# Patient Record
Sex: MALE | Race: ASIAN | Employment: OTHER | URBAN - METROPOLITAN AREA
[De-identification: names, ages, dates, MRNs, and addresses within clinical notes are randomized per-mention and may not be internally consistent; named-entity substitution may affect disease eponyms.]

---

## 2018-02-14 ENCOUNTER — LAB ENCOUNTER (OUTPATIENT)
Dept: LAB | Age: 73
End: 2018-02-14
Attending: FAMILY MEDICINE
Payer: MEDICAID

## 2018-02-14 ENCOUNTER — APPOINTMENT (OUTPATIENT)
Dept: LAB | Age: 73
End: 2018-02-14
Attending: FAMILY MEDICINE
Payer: MEDICAID

## 2018-02-14 DIAGNOSIS — I10 ESSENTIAL HYPERTENSION: ICD-10-CM

## 2018-02-14 DIAGNOSIS — E66.9 OBESITY (BMI 30-39.9): ICD-10-CM

## 2018-02-14 DIAGNOSIS — F03.91 DEMENTIA WITH BEHAVIORAL DISTURBANCE, UNSPECIFIED DEMENTIA TYPE (HCC): ICD-10-CM

## 2018-02-14 DIAGNOSIS — R07.89 CHEST DISCOMFORT: ICD-10-CM

## 2018-02-14 PROBLEM — R41.3 MEMORY LOSS: Status: ACTIVE | Noted: 2018-02-14

## 2018-02-14 PROBLEM — R15.1 FECAL SMEARING: Status: ACTIVE | Noted: 2018-02-14

## 2018-02-14 PROBLEM — F03.918 DEMENTIA WITH BEHAVIORAL DISTURBANCE: Status: ACTIVE | Noted: 2018-02-14

## 2018-02-14 PROBLEM — F03.918 DEMENTIA WITH BEHAVIORAL DISTURBANCE (HCC): Status: ACTIVE | Noted: 2018-02-14

## 2018-02-14 PROBLEM — Z85.810 HISTORY OF TONGUE CANCER: Status: ACTIVE | Noted: 2018-02-14

## 2018-02-14 LAB
ALBUMIN SERPL BCP-MCNC: 3.7 G/DL (ref 3.5–4.8)
ALBUMIN/GLOB SERPL: 0.9 {RATIO} (ref 1–2)
ALP SERPL-CCNC: 58 U/L (ref 32–100)
ALT SERPL-CCNC: 13 U/L (ref 17–63)
ANION GAP SERPL CALC-SCNC: 9 MMOL/L (ref 0–18)
AST SERPL-CCNC: 18 U/L (ref 15–41)
BASOPHILS # BLD: 0.1 K/UL (ref 0–0.2)
BASOPHILS NFR BLD: 1 %
BILIRUB SERPL-MCNC: 0.7 MG/DL (ref 0.3–1.2)
BUN SERPL-MCNC: 14 MG/DL (ref 8–20)
BUN/CREAT SERPL: 14 (ref 10–20)
CALCIUM SERPL-MCNC: 9.5 MG/DL (ref 8.5–10.5)
CHLORIDE SERPL-SCNC: 102 MMOL/L (ref 95–110)
CO2 SERPL-SCNC: 25 MMOL/L (ref 22–32)
CREAT SERPL-MCNC: 1 MG/DL (ref 0.5–1.5)
EOSINOPHIL # BLD: 0.1 K/UL (ref 0–0.7)
EOSINOPHIL NFR BLD: 1 %
ERYTHROCYTE [DISTWIDTH] IN BLOOD BY AUTOMATED COUNT: 14.4 % (ref 11–15)
GLOBULIN PLAS-MCNC: 4.2 G/DL (ref 2.5–3.7)
GLUCOSE SERPL-MCNC: 81 MG/DL (ref 70–99)
HCT VFR BLD AUTO: 42.5 % (ref 41–52)
HGB BLD-MCNC: 14 G/DL (ref 13.5–17.5)
LYMPHOCYTES # BLD: 2.9 K/UL (ref 1–4)
LYMPHOCYTES NFR BLD: 28 %
MCH RBC QN AUTO: 27.5 PG (ref 27–32)
MCHC RBC AUTO-ENTMCNC: 33 G/DL (ref 32–37)
MCV RBC AUTO: 83.5 FL (ref 80–100)
MONOCYTES # BLD: 0.9 K/UL (ref 0–1)
MONOCYTES NFR BLD: 9 %
NEUTROPHILS # BLD AUTO: 6.3 K/UL (ref 1.8–7.7)
NEUTROPHILS NFR BLD: 61 %
OSMOLALITY UR CALC.SUM OF ELEC: 282 MOSM/KG (ref 275–295)
PATIENT FASTING: NO
PLATELET # BLD AUTO: 310 K/UL (ref 140–400)
PMV BLD AUTO: 9.3 FL (ref 7.4–10.3)
POTASSIUM SERPL-SCNC: 4.2 MMOL/L (ref 3.3–5.1)
PROT SERPL-MCNC: 7.9 G/DL (ref 5.9–8.4)
RBC # BLD AUTO: 5.09 M/UL (ref 4.5–5.9)
SODIUM SERPL-SCNC: 136 MMOL/L (ref 136–144)
TSH SERPL-ACNC: 1.2 UIU/ML (ref 0.45–5.33)
VIT B12 SERPL-MCNC: 159 PG/ML (ref 181–914)
WBC # BLD AUTO: 10.3 K/UL (ref 4–11)

## 2018-02-14 PROCEDURE — 80053 COMPREHEN METABOLIC PANEL: CPT

## 2018-02-14 PROCEDURE — 93005 ELECTROCARDIOGRAM TRACING: CPT

## 2018-02-14 PROCEDURE — 82607 VITAMIN B-12: CPT

## 2018-02-14 PROCEDURE — 93010 ELECTROCARDIOGRAM REPORT: CPT | Performed by: FAMILY MEDICINE

## 2018-02-14 PROCEDURE — 84443 ASSAY THYROID STIM HORMONE: CPT

## 2018-02-14 PROCEDURE — 36415 COLL VENOUS BLD VENIPUNCTURE: CPT

## 2018-02-14 PROCEDURE — 85025 COMPLETE CBC W/AUTO DIFF WBC: CPT

## 2018-02-14 PROCEDURE — 83036 HEMOGLOBIN GLYCOSYLATED A1C: CPT

## 2018-02-14 NOTE — PROGRESS NOTES
HPI:    Patient ID: Micheal Hunt is a 67year old male. HPI      Patient is new to me and comes in today with his daughter. Daughter states he has been having memory problems for the last 7 years and has been declining.   She states it has become very d called? 1   SAY: I would like you to repeat this phrase after me: \"No ifs, ands or buts. \"  1   Close Your Eyes 1   HAND the person a pencil and paper. SAY: Write any complete sentence on that piece of paper.   1   Copy This Design 1   SAY: Take this marah behavioral disturbance, unspecified dementia type  (primary encounter diagnosis)  Essential hypertension  History of tongue cancer  Obesity (bmi 30-39. 9)  Chest discomfort  Fecal smearing  Memory loss      1.  Dementia with behavioral disturbance, unspecifi

## 2018-02-15 LAB — HBA1C MFR BLD: 7.1 % (ref 4–6)

## 2018-02-22 PROBLEM — IMO0001 UNCONTROLLED TYPE 2 DIABETES MELLITUS WITHOUT COMPLICATION, WITHOUT LONG-TERM CURRENT USE OF INSULIN: Status: ACTIVE | Noted: 2018-02-22

## 2018-02-22 PROBLEM — E53.8 VITAMIN B12 DEFICIENCY: Status: ACTIVE | Noted: 2018-02-22

## 2018-02-23 ENCOUNTER — TELEPHONE (OUTPATIENT)
Dept: OTHER | Age: 73
End: 2018-02-23

## 2018-02-23 NOTE — TELEPHONE ENCOUNTER
Daughter Gabi Gaviria was inform of Dr. Radha Goldsmith message below. DAughter verbalized understanding and will callus back on Monday to set up a appt.  Thanks

## 2018-02-23 NOTE — TELEPHONE ENCOUNTER
Notes Recorded by Mateo Lockwood MD on 2/22/2018 at 9:05 PM CST  Patient has diabetes.  His vitamin B12 levels also very low.   Recommend him getting B12 monthly injections and to repeat his level in 3 months.  Also given his diabetes would recommend startin

## 2018-03-10 RX ORDER — LISINOPRIL 10 MG/1
TABLET ORAL
Qty: 30 TABLET | Refills: 0 | Status: SHIPPED | OUTPATIENT
Start: 2018-03-10 | End: 2018-04-10

## 2018-03-10 NOTE — TELEPHONE ENCOUNTER
Hypertensive Medications  Protocol Criteria:  · Appointment scheduled in the past 6 months or in the next 3 months  · BMP or CMP in the past 12 months  · Creatinine result < 2  Recent Outpatient Visits            3 weeks ago Dementia with behavioral distur

## 2018-04-12 RX ORDER — LISINOPRIL 10 MG/1
TABLET ORAL
Qty: 30 TABLET | Refills: 2 | Status: SHIPPED | OUTPATIENT
Start: 2018-04-12 | End: 2018-05-01

## 2018-04-12 NOTE — TELEPHONE ENCOUNTER
Refilled per protocol.   Hypertensive Medications  Protocol Criteria:  · Appointment scheduled in the past 6 months or in the next 3 months  · BMP or CMP in the past 12 months  · Creatinine result < 2  Recent Outpatient Visits            1 month ago Deanne Landry

## 2018-05-01 ENCOUNTER — OFFICE VISIT (OUTPATIENT)
Dept: FAMILY MEDICINE CLINIC | Facility: CLINIC | Age: 73
End: 2018-05-01

## 2018-05-01 VITALS
DIASTOLIC BLOOD PRESSURE: 84 MMHG | WEIGHT: 200 LBS | HEART RATE: 72 BPM | SYSTOLIC BLOOD PRESSURE: 128 MMHG | HEIGHT: 62 IN | BODY MASS INDEX: 36.8 KG/M2

## 2018-05-01 DIAGNOSIS — E66.9 OBESITY (BMI 30-39.9): ICD-10-CM

## 2018-05-01 DIAGNOSIS — E53.8 VITAMIN B12 DEFICIENCY: ICD-10-CM

## 2018-05-01 DIAGNOSIS — I10 ESSENTIAL HYPERTENSION: Primary | ICD-10-CM

## 2018-05-01 DIAGNOSIS — IMO0001 UNCONTROLLED TYPE 2 DIABETES MELLITUS WITHOUT COMPLICATION, WITHOUT LONG-TERM CURRENT USE OF INSULIN: ICD-10-CM

## 2018-05-01 DIAGNOSIS — F03.91 DEMENTIA WITH BEHAVIORAL DISTURBANCE, UNSPECIFIED DEMENTIA TYPE (HCC): ICD-10-CM

## 2018-05-01 PROCEDURE — 96372 THER/PROPH/DIAG INJ SC/IM: CPT | Performed by: FAMILY MEDICINE

## 2018-05-01 PROCEDURE — 99214 OFFICE O/P EST MOD 30 MIN: CPT | Performed by: FAMILY MEDICINE

## 2018-05-01 PROCEDURE — 99212 OFFICE O/P EST SF 10 MIN: CPT | Performed by: FAMILY MEDICINE

## 2018-05-01 RX ORDER — LISINOPRIL 10 MG/1
10 TABLET ORAL
Qty: 90 TABLET | Refills: 1 | Status: SHIPPED | OUTPATIENT
Start: 2018-05-01 | End: 2019-03-21

## 2018-05-01 RX ADMIN — CYANOCOBALAMIN 1000 MCG: 1000 INJECTION INTRAMUSCULAR; SUBCUTANEOUS at 16:02:00

## 2018-05-02 NOTE — PROGRESS NOTES
HPI:    Patient ID: Anderson Brantley is a 67year old male. HPI     Patient here with his daughter and wife. He has history of dementia. Patient is not able to give a history. Daughter states he has continued to be the same.   He is taking his lisinopril present. Cardiovascular: Normal rate and regular rhythm. Pulmonary/Chest: Effort normal and breath sounds normal. He has no wheezes. Abdominal: Soft. Bowel sounds are normal. There is no tenderness. Musculoskeletal: He exhibits no edema.    Neurolo MG Oral Tab 90 tablet 1      Sig: Take 1 tablet (10 mg total) by mouth once daily.       MetFORMIN HCl 500 MG Oral Tab 90 tablet 1      Sig: Take 1 tablet (500 mg total) by mouth daily with breakfast.           Imaging & Referrals:  None       CI#[id number]

## 2018-10-22 ENCOUNTER — OFFICE VISIT (OUTPATIENT)
Dept: FAMILY MEDICINE CLINIC | Facility: CLINIC | Age: 73
End: 2018-10-22
Payer: MEDICAID

## 2018-10-22 VITALS
SYSTOLIC BLOOD PRESSURE: 147 MMHG | HEART RATE: 74 BPM | BODY MASS INDEX: 34.41 KG/M2 | HEIGHT: 62 IN | WEIGHT: 187 LBS | TEMPERATURE: 98 F | DIASTOLIC BLOOD PRESSURE: 80 MMHG

## 2018-10-22 DIAGNOSIS — R32 URINARY INCONTINENCE, UNSPECIFIED TYPE: ICD-10-CM

## 2018-10-22 DIAGNOSIS — R15.9 INCONTINENCE OF FECES, UNSPECIFIED FECAL INCONTINENCE TYPE: ICD-10-CM

## 2018-10-22 DIAGNOSIS — R39.15 URGENCY OF URINATION: Primary | ICD-10-CM

## 2018-10-22 DIAGNOSIS — F03.91 DEMENTIA WITH BEHAVIORAL DISTURBANCE, UNSPECIFIED DEMENTIA TYPE (HCC): ICD-10-CM

## 2018-10-22 DIAGNOSIS — E53.8 VITAMIN B12 DEFICIENCY: ICD-10-CM

## 2018-10-22 DIAGNOSIS — Z23 NEED FOR INFLUENZA VACCINATION: ICD-10-CM

## 2018-10-22 PROCEDURE — 90653 IIV ADJUVANT VACCINE IM: CPT | Performed by: FAMILY MEDICINE

## 2018-10-22 PROCEDURE — 99212 OFFICE O/P EST SF 10 MIN: CPT | Performed by: FAMILY MEDICINE

## 2018-10-22 PROCEDURE — 96372 THER/PROPH/DIAG INJ SC/IM: CPT | Performed by: FAMILY MEDICINE

## 2018-10-22 PROCEDURE — 99214 OFFICE O/P EST MOD 30 MIN: CPT | Performed by: FAMILY MEDICINE

## 2018-10-22 PROCEDURE — 90471 IMMUNIZATION ADMIN: CPT | Performed by: FAMILY MEDICINE

## 2018-10-22 RX ORDER — CYANOCOBALAMIN 1000 UG/ML
1000 INJECTION INTRAMUSCULAR; SUBCUTANEOUS ONCE
Status: COMPLETED | OUTPATIENT
Start: 2018-10-22 | End: 2018-10-22

## 2018-10-22 RX ADMIN — CYANOCOBALAMIN 1000 MCG: 1000 INJECTION INTRAMUSCULAR; SUBCUTANEOUS at 14:56:00

## 2018-10-22 NOTE — PROGRESS NOTES
HPI:    Patient ID: Nadira Hernandez is a 68year old male. HPI    Patient here for follow-up with complains of having leakage of urine as well as having urgency with urination.   While waiting in the room with his wife patient went 4 times to the toilet wit diagnosis)  Incontinence of feces, unspecified fecal incontinence type  Urinary incontinence, unspecified type  Vitamin b12 deficiency  Dementia with behavioral disturbance, unspecified dementia type    1.  Urgency of urination  Unable to collect urine here

## 2019-03-21 PROBLEM — R32 URINARY INCONTINENCE: Status: ACTIVE | Noted: 2019-03-21
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
## 2019-03-22 DIAGNOSIS — R82.90 ABNORMAL URINE FINDING: Primary | ICD-10-CM

## 2019-04-15 ENCOUNTER — HOSPITAL ENCOUNTER (OUTPATIENT)
Dept: CT IMAGING | Facility: HOSPITAL | Age: 74
Discharge: HOME | End: 2019-04-15
Attending: FAMILY MEDICINE
Payer: MEDICAID

## 2019-04-15 DIAGNOSIS — F03.91 DEMENTIA WITH BEHAVIORAL DISTURBANCE, UNSPECIFIED DEMENTIA TYPE (HCC): ICD-10-CM

## 2019-04-15 DIAGNOSIS — R45.4 BEHAVIOR-IRRITABILITY: ICD-10-CM

## 2019-04-15 PROCEDURE — 70450 CT HEAD/BRAIN W/O DYE: CPT | Performed by: FAMILY MEDICINE

## 2019-07-11 ENCOUNTER — TELEPHONE (OUTPATIENT)
Dept: FAMILY MEDICINE CLINIC | Facility: CLINIC | Age: 74
End: 2019-07-11

## 2020-01-04 PROBLEM — N30.00 ACUTE CYSTITIS WITHOUT HEMATURIA: Status: ACTIVE | Noted: 2020-01-04

## 2020-01-04 PROBLEM — G30.9 ALZHEIMER'S DEMENTIA WITH BEHAVIORAL DISTURBANCE, UNSPECIFIED TIMING OF DEMENTIA ONSET: Status: ACTIVE | Noted: 2020-01-04

## 2020-01-04 PROBLEM — G30.9 ALZHEIMER'S DEMENTIA WITH BEHAVIORAL DISTURBANCE, UNSPECIFIED TIMING OF DEMENTIA ONSET (HCC): Status: ACTIVE | Noted: 2020-01-04

## 2020-01-04 PROBLEM — F02.81 ALZHEIMER'S DEMENTIA WITH BEHAVIORAL DISTURBANCE, UNSPECIFIED TIMING OF DEMENTIA ONSET (HCC): Status: ACTIVE | Noted: 2020-01-04

## 2020-01-04 PROBLEM — F02.81 ALZHEIMER'S DEMENTIA WITH BEHAVIORAL DISTURBANCE, UNSPECIFIED TIMING OF DEMENTIA ONSET: Status: ACTIVE | Noted: 2020-01-04

## 2020-01-05 NOTE — CONSULTS
[facility]    Report of Consultation    Valdez Glynn Patient Status:  Inpatient     MRN [id number]   Location [facility] 5SW/SE Attending Aminata Mo MD   Hosp Day # 1 PCP Vito Plata MD     Date of Admission:   • OTHER SURGICAL HISTORY  2011    tongue cancer surgery, [place], no radiation/ chemo   • TONGUE AND MOUTH SURG UNLISTED      To remove Tongue CA '11       Family History  History reviewed. No pertinent family history.     Social History  Patient Netta Breen no cyanosis or edema     Neurological:     Mental Status- Alert, however he thought that he was in [place] back in his home, he would follow some very simple commands but not all of them.   He was able to lift his arms on command and touch his nose in the significant centralized atrophy noted    Impression:     Alzheimer's dementia with behavioral disturbance, unspecified timing of dementia onset ([facility] [address] is most likely summation, but there could be some combination with vascular dementia in setting of untreat
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
## 2020-01-05 NOTE — ED PROVIDER NOTES
Patient Seen in: [facility] 5sw/se      History   Patient presents with:  Altered Mental Status    Stated Complaint: AMS    HPI    76year old male with h/o dementia who is brought for AMS. Apparently the patient was found wandering outside.   The Current:/64 (BP Location: Right arm)   Pulse 70   Temp 98.4 °F (36.9 °C) (Oral)   Resp 20   Wt 81.6 kg   SpO2 98%   BMI 32.92 kg/m²         Physical Exam  Vitals signs and nursing note reviewed.    Constitutional:       General: He is not in acu other components within normal limits   URINALYSIS WITH CULTURE REFLEX - Abnormal; Notable for the following components:    Clarity Urine Cloudy (*)     Blood Urine Small (*)     Leukocyte Esterase Urine Large (*)     WBC Urine 280 (*)     WBC Clump Few Charm Patter administration in time range)   0.9% NaCl infusion ( Intravenous New Bag 1/5/20 0008)   Heparin Sodium (Porcine) 5000 UNIT/ML injection 5,000 Units (has no administration in time range)   ondansetron HCl (ZOFRAN) injection 4 mg (has no administration in ti follow-up provider specified. We recommend that you schedule follow up care with a primary care provider within the next three months to obtain basic health screening including reassessment of your blood pressure.     Medications Prescribed:  There are no

## 2020-01-05 NOTE — ED NOTES
Contacted Jose Eduardo's daughter, Richard Clark, and notified her that her father is at this Emergency Department. She states she is on her way.

## 2020-01-05 NOTE — PROGRESS NOTES
[facility]  Hospitalist Progress Note     Joyce Paulino Patient Status:  Inpatient      76year old [facility] [id number]   Location 543/543-A Attending Albert Bella MD   Hosp Day # 1 PCP Vito Plata MD     ASSESSMENT/PLAN    Acute m GLU 91 105*   BUN 19* 17   CREATSERUM 1.11 1.11   GFRAA 75 75   GFRNAA 65 65   CA 8.9 8.3*   ALB 3.6  --     141   K 3.9 3.7    108   CO2 27.0 27.0   ALKPHO 62  --    AST 13*  --    ALT 18  --    BILT 0.8  --    TP 8.2  --      No results for

## 2020-01-05 NOTE — PLAN OF CARE
Patient was admitted overnight. He is confused, anxious at times, and oriented to self only. Cooperative, pleasant, and follows commands. Family was at bedside briefly, and his daughter was unsure of the patient's medical history and medications at home.  Tiffany Montague assistive devices as appropriate  - Consider OT/PT consult to assist with strengthening/mobility  - Encourage toileting schedule  Outcome: Progressing     Problem: GENITOURINARY - ADULT  Goal: Absence of urinary retention  Description  INTERVENTIONS:  - As PLANNING  Goal: Discharge to home or other facility with appropriate resources  Description  INTERVENTIONS:  - Identify barriers to discharge w/pt and caregiver  - Include patient/family/discharge partner in discharge planning  - Arrange for needed dischar Spiritual Care consult as indicated  - Consult Pharmacy as needed  Outcome: Not Progressing     Problem: BEHAVIOR  Goal: Pt/Family maintain appropriate behavior and adhere to behavioral management agreement, if implemented  Description  INTERVENTIONS:  - A
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
## 2020-01-05 NOTE — ED NOTES
[facility] Resource Referral Counselor Note    Deejay Guardian Patient Status:  Emergency    1945 MRN [id number]   Location [address] Attending Deb Cardenas MD   [facility] Day # 0 PCP Vito Cast MD       C

## 2020-01-05 NOTE — PLAN OF CARE
Problem: Patient Centered Care  Goal: Patient preferences are identified and integrated in the patient's plan of care  Description  Interventions:  - What would you like us to know as we care for you?  Lives at home with wife and son  - Provide timely, co on assessment  - Modify environment to reduce risk of injury  - Provide assistive devices as appropriate  - Consider OT/PT consult to assist with strengthening/mobility  - Encourage toileting schedule  Outcome: Progressing     Problem: DISCHARGE PLANNING staff  Outcome: Progressing     Problem: CONFUSION  Goal: Confusion, delirium, dementia or psychosis is improved or at baseline  Description  INTERVENTIONS:  - Assess for possible contributors to thought disturbance, including medications, impaired vision Will cooperate with staff recommendations and doctor's orders and will demonstrate appropriate behavior  Description  INTERVENTIONS:  - Treat underlying conditions and offer medication as ordered  - Educate regarding involuntary admission procedures and ru

## 2020-01-06 NOTE — PROGRESS NOTES
[facility] AND [facility]  Neurology Progress Note    Deejay Martins Patient Status:  Inpatient     MRN [id number]   Location [facility] 5SW/SE Attending Saúl Ramos MD   [facility] Day # 2 PCP Vito Plata MD     Subjective:  Deejay Martins is a(n) 7 Status- Alert, however he thought that he was in [place] back in his home, he would follow some very simple commands but not all of them. He was able to lift his arms on command and touch his nose in the command.   However he would not follow with his gaz hypertension     History of tongue cancer     Obesity (BMI 30-39. 9)     Fecal smearing     Memory loss     Vitamin B12 deficiency     Uncontrolled type 2 diabetes mellitus without complication, without long-term current use of insulin     Urinary incontine

## 2020-01-06 NOTE — CM/SW NOTE
EBONY spoke with the pt's dtr. Miladys Hagen ([phone]) via telephone. The pt. Lives with his wife and son in a ground level home with no stairs. The pt. Does not use any DME and he has a caregiver through [facility] for 60 hours/week. The pt's son and dtr.
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
## 2020-01-06 NOTE — PROGRESS NOTES
[facility]  Hospitalist Progress Note     Nataliia Smith Patient Status:  Inpatient      76year old [facility] [id number]   Location 543/543-A Attending Vicente Whitfield MD   Hosp Day # 2 PCP Vito Plata MD     ASSESSMENT/PLAN    Acute m 01/04/20 2016 01/05/20 0706 01/06/20  0743   GLU 91 105*  --    BUN 19* 17  --    CREATSERUM 1.11 1.11  --    GFRAA 75 75  --    GFRNAA 65 65  --    CA 8.9 8.3*  --    ALB 3.6  --   --     141  --    K 3.9 3.7 3.9    108  --    CO2 27.0 27.0

## 2020-01-07 ENCOUNTER — PATIENT OUTREACH (OUTPATIENT)
Dept: CASE MANAGEMENT | Age: 75
End: 2020-01-07

## 2020-01-07 NOTE — PROGRESS NOTES
Initial Post Discharge Follow Up   Discharge Date: 1/6/20  Contact Date: 1/7/2020    Consent Verification:  Assessment Completed With: Farrah Ward Relationship:  Daughter with written concent  HIPAA Verified?   Yes      Discharge Dx:   Acute metabolic about your new medication? No  • Did you  your discharge medications when you left the hospital? Yes  • May I go over your medications with you to make sure we are not missing anything? yes  • Are there any reasons that keep you from taking your medi

## 2020-01-07 NOTE — PLAN OF CARE
Problem: Patient Centered Care  Goal: Patient preferences are identified and integrated in the patient's plan of care  Description  Interventions:  - What would you like us to know as we care for you?  Lives at home with wife and son  - Provide timely, co to call for assistance with activity based on assessment  - Modify environment to reduce risk of injury  - Provide assistive devices as appropriate  - Consider OT/PT consult to assist with strengthening/mobility  - Encourage toileting schedule  Outcome:  Ad alternative coping skills  - Provide emotional support with 1:1 interaction with staff  Outcome: Adequate for Discharge     Problem: CONFUSION  Goal: Confusion, delirium, dementia or psychosis is improved or at baseline  Description  INTERVENTIONS:  - Asse Psychosocial CNS, Spiritual Care as appropriate  Outcome: Adequate for Discharge     Problem: INVOLUNTARY ADMIT  Goal: Will cooperate with staff recommendations and doctor's orders and will demonstrate appropriate behavior  Description  INTERVENTIONS:  - T

## 2020-01-07 NOTE — PAYOR COMM NOTE
--------------  DISCHARGE REVIEW    Payor: Moe Martinez #:  [id number]  Authorization Number: [id number]    Admit date: 1/4/20  Admit time:  2339  Discharge Date: 1/6/2020  5:42 PM     Admitting Physician: Dana Aguilar

## 2020-01-14 PROBLEM — E66.9 DIABETES MELLITUS TYPE 2 IN OBESE: Status: ACTIVE | Noted: 2020-01-14

## 2020-01-14 PROBLEM — R15.9 INCONTINENCE OF FECES: Status: ACTIVE | Noted: 2020-01-14

## 2020-01-14 PROBLEM — E11.69 DIABETES MELLITUS TYPE 2 IN OBESE: Status: ACTIVE | Noted: 2020-01-14

## 2020-01-14 PROBLEM — E11.69 DIABETES MELLITUS TYPE 2 IN OBESE (HCC): Status: ACTIVE | Noted: 2020-01-14

## 2020-01-14 PROBLEM — E66.9 DIABETES MELLITUS TYPE 2 IN OBESE (HCC): Status: ACTIVE | Noted: 2020-01-14

## 2020-01-14 PROBLEM — E66.9 DIABETES MELLITUS TYPE 2 IN OBESE  (HCC): Status: ACTIVE | Noted: 2020-01-14

## 2020-01-14 PROBLEM — E11.69 DIABETES MELLITUS TYPE 2 IN OBESE  (HCC): Status: ACTIVE | Noted: 2020-01-14

## 2020-01-14 NOTE — PROGRESS NOTES
HPI:    Patient ID: Tanesha Hurst is a 76year old male. HPI  Patient presents with:  ER F/U: went to [address] [place] [address] on 1-4-20 was diagnosed with a  urine infection     Admitted at [address] [place] [address] 1/4/20 - 1/6/20  Reviewed consult notes and some HPI notes from hospitalization. mouth daily. 30 tablet 0   • QUEtiapine Fumarate 25 MG Oral Tab Take 1 tablet (25 mg total) by mouth nightly. 30 tablet 0   • Donepezil HCl 5 MG Oral Tab Take 1 tablet (5 mg total) by mouth nightly.  30 tablet 0     Allergies:No Known Allergies   PHYSICAL E ([address]  Start metformin once daily  - metFORMIN HCl 500 MG Oral Tab; Take 1 tablet (500 mg total) by mouth daily with breakfast.  Dispense: 90 tablet; Refill: 3    6. Obesity (BMI 30-39. 9)  Reduce food portions, increase fresh vegetables/ fruit in diet

## 2020-01-16 NOTE — DISCHARGE SUMMARY
[facility] HOSPITALIST  DISCHARGE SUMMARY     Clarence Quiñonez Patient Status:  Inpatient    1945 MRN [id number]   Location [facility] 5SW/SE Attending No att. providers found   Hosp Day # 2 PCP Vito Plata MD     DATE OF ADMISSION: 2020  DA therefore, he has never been seen. He was supposed to be on some medication, it is not clear what, but he has not been taking it.   Per the outpatient record, it indicates that he was on lisinopril and metformin, but the family denied any history of diabet exacerbated the situation, but the majority of his issues are chronic and progressive. There is a suggestion to be admitted to a memory care unit, but this was not a strict requirement from psychiatry.   From their standpoint he was cleared to be discharge [phone]  [phone]    In 1 week  Post Discharge Followup    The above plan and follow-up instructions were reviewed with the patient and they verbalized understanding and agreement.   They understand to return to the emergency room for

## 2020-01-20 ENCOUNTER — MED REC SCAN ONLY (OUTPATIENT)
Dept: FAMILY MEDICINE CLINIC | Facility: CLINIC | Age: 75
End: 2020-01-20

## 2020-01-21 ENCOUNTER — TELEPHONE (OUTPATIENT)
Dept: FAMILY MEDICINE CLINIC | Facility: CLINIC | Age: 75
End: 2020-01-21

## 2020-01-21 NOTE — TELEPHONE ENCOUNTER
Received order from [address] [place] [address] for DME/Equipment order signed by Dr. Emma Ireland and faxed back successful

## 2020-08-27 ENCOUNTER — OFFICE VISIT (OUTPATIENT)
Dept: FAMILY MEDICINE CLINIC | Facility: CLINIC | Age: 75
End: 2020-08-27
Payer: MEDICAID

## 2020-08-27 VITALS
HEART RATE: 65 BPM | SYSTOLIC BLOOD PRESSURE: 124 MMHG | TEMPERATURE: 100 F | DIASTOLIC BLOOD PRESSURE: 77 MMHG | BODY MASS INDEX: 34.23 KG/M2 | WEIGHT: 186 LBS | HEIGHT: 62 IN

## 2020-08-27 DIAGNOSIS — Z85.810 HISTORY OF TONGUE CANCER: ICD-10-CM

## 2020-08-27 DIAGNOSIS — I10 ESSENTIAL HYPERTENSION: ICD-10-CM

## 2020-08-27 DIAGNOSIS — Z00.00 WELL ADULT EXAM: Primary | ICD-10-CM

## 2020-08-27 DIAGNOSIS — R41.3 MEMORY LOSS: ICD-10-CM

## 2020-08-27 DIAGNOSIS — E11.8 CONTROLLED TYPE 2 DIABETES MELLITUS WITH COMPLICATION, WITHOUT LONG-TERM CURRENT USE OF INSULIN (HCC): ICD-10-CM

## 2020-08-27 DIAGNOSIS — F03.91 DEMENTIA WITH BEHAVIORAL DISTURBANCE, UNSPECIFIED DEMENTIA TYPE (HCC): ICD-10-CM

## 2020-08-27 DIAGNOSIS — E53.8 VITAMIN B12 DEFICIENCY: ICD-10-CM

## 2020-08-27 DIAGNOSIS — R32 URINARY INCONTINENCE, UNSPECIFIED TYPE: ICD-10-CM

## 2020-08-27 DIAGNOSIS — E66.9 OBESITY (BMI 30-39.9): ICD-10-CM

## 2020-08-27 PROBLEM — N30.00 ACUTE CYSTITIS WITHOUT HEMATURIA: Status: RESOLVED | Noted: 2020-01-04 | Resolved: 2020-08-27

## 2020-08-27 PROBLEM — F02.81 ALZHEIMER'S DEMENTIA WITH BEHAVIORAL DISTURBANCE, UNSPECIFIED TIMING OF DEMENTIA ONSET (HCC): Status: RESOLVED | Noted: 2020-01-04 | Resolved: 2020-08-27

## 2020-08-27 PROBLEM — R15.9 INCONTINENCE OF FECES: Status: RESOLVED | Noted: 2020-01-14 | Resolved: 2020-08-27

## 2020-08-27 PROBLEM — E11.69 DIABETES MELLITUS TYPE 2 IN OBESE (HCC): Status: RESOLVED | Noted: 2020-01-14 | Resolved: 2020-08-27

## 2020-08-27 PROBLEM — G30.9 ALZHEIMER'S DEMENTIA WITH BEHAVIORAL DISTURBANCE, UNSPECIFIED TIMING OF DEMENTIA ONSET: Status: RESOLVED | Noted: 2020-01-04 | Resolved: 2020-08-27

## 2020-08-27 PROBLEM — G30.9 ALZHEIMER'S DEMENTIA WITH BEHAVIORAL DISTURBANCE, UNSPECIFIED TIMING OF DEMENTIA ONSET (HCC): Status: RESOLVED | Noted: 2020-01-04 | Resolved: 2020-08-27

## 2020-08-27 PROBLEM — E11.69 DIABETES MELLITUS TYPE 2 IN OBESE: Status: RESOLVED | Noted: 2020-01-14 | Resolved: 2020-08-27

## 2020-08-27 PROBLEM — R15.1 FECAL SMEARING: Status: RESOLVED | Noted: 2018-02-14 | Resolved: 2020-08-27

## 2020-08-27 PROBLEM — E11.69 DIABETES MELLITUS TYPE 2 IN OBESE  (HCC): Status: RESOLVED | Noted: 2020-01-14 | Resolved: 2020-08-27

## 2020-08-27 PROBLEM — F02.81 ALZHEIMER'S DEMENTIA WITH BEHAVIORAL DISTURBANCE, UNSPECIFIED TIMING OF DEMENTIA ONSET: Status: RESOLVED | Noted: 2020-01-04 | Resolved: 2020-08-27

## 2020-08-27 PROCEDURE — 3008F BODY MASS INDEX DOCD: CPT | Performed by: FAMILY MEDICINE

## 2020-08-27 PROCEDURE — 3078F DIAST BP <80 MM HG: CPT | Performed by: FAMILY MEDICINE

## 2020-08-27 PROCEDURE — 99397 PER PM REEVAL EST PAT 65+ YR: CPT | Performed by: FAMILY MEDICINE

## 2020-08-27 PROCEDURE — 3074F SYST BP LT 130 MM HG: CPT | Performed by: FAMILY MEDICINE

## 2020-08-27 RX ORDER — DONEPEZIL HYDROCHLORIDE 5 MG/1
5 TABLET, FILM COATED ORAL NIGHTLY
Qty: 30 TABLET | Refills: 3 | Status: SHIPPED | OUTPATIENT
Start: 2020-08-27 | End: 2020-10-27 | Stop reason: DRUGHIGH

## 2020-08-27 RX ORDER — QUETIAPINE 25 MG/1
25 TABLET, FILM COATED ORAL NIGHTLY
Qty: 30 TABLET | Refills: 3 | Status: SHIPPED | OUTPATIENT
Start: 2020-08-27 | End: 2020-10-27 | Stop reason: DRUGHIGH

## 2020-08-27 RX ORDER — BLOOD SUGAR DIAGNOSTIC
STRIP MISCELLANEOUS
Qty: 100 STRIP | Refills: 1 | Status: SHIPPED | OUTPATIENT
Start: 2020-08-27

## 2020-08-27 RX ORDER — LISINOPRIL 2.5 MG/1
2.5 TABLET ORAL DAILY
Qty: 90 TABLET | Refills: 3 | Status: SHIPPED | OUTPATIENT
Start: 2020-08-27 | End: 2021-09-30 | Stop reason: ALTCHOICE

## 2020-08-27 RX ORDER — BLOOD-GLUCOSE METER
EACH MISCELLANEOUS
Qty: 1 KIT | Refills: 0 | Status: SHIPPED | OUTPATIENT
Start: 2020-08-27

## 2020-08-27 NOTE — PROGRESS NOTES
HPI:    Patient ID: Sam Stokes is a 76year old male. HPI  Patient presents with: Well Adult    Patient is brought in today by his daughter. Patient is over some dementia.   He was seen in the emergency room back in January and was prescribed prescri 5' 2\" (1.575 m)   Wt 186 lb (84.4 kg)   BMI 34.02 kg/m²     Past Medical History:   Diagnosis Date   • Dementia ([facility] [address]    • Incontinence of feces 1/14/2020     Past Surgical History:   Procedure Laterality Date   • OTHER SURGICAL HISTORY  2011    tongue ca FLUAD High Dose 65 yr and older (26493) 10/22/2018       LDL Control due on 08/18/1945  Diabetes Care Dilated Eye Exam due on [birth year]18/1945  Annual Physical due on 08/18/1948  FIT Colorectal Screening due on 08/18/1995  Colonoscopy due on 08/18/1995  PSA due Neck supple. No thyromegaly present. Cardiovascular: Normal rate, regular rhythm and normal heart sounds. No murmur heard. Pulmonary/Chest: Effort normal and breath sounds normal. He has no wheezes. Abdominal: Soft.  Bowel sounds are normal. He exhib Fumarate 25 MG Oral Tab; Take 1 tablet (25 mg total) by mouth nightly. Dispense: 30 tablet; Refill: 3  - Donepezil HCl 5 MG Oral Tab; Take 1 tablet (5 mg total) by mouth nightly. Dispense: 30 tablet; Refill: 3    4. History of tongue cancer      5.  [place] 100 strip 1     Sig: Check sugars daily as needed dispense lancets as well   • lisinopril 2.5 MG Oral Tab 90 tablet 3     Sig: Take 1 tablet (2.5 mg total) by mouth daily.        Imaging & Referrals:  None       FN#[id number]

## 2020-09-01 ENCOUNTER — LAB ENCOUNTER (OUTPATIENT)
Dept: LAB | Age: 75
End: 2020-09-01
Attending: FAMILY MEDICINE
Payer: MEDICAID

## 2020-09-01 DIAGNOSIS — Z00.00 WELL ADULT EXAM: ICD-10-CM

## 2020-09-01 DIAGNOSIS — E53.8 VITAMIN B12 DEFICIENCY: ICD-10-CM

## 2020-09-01 DIAGNOSIS — E11.8 CONTROLLED TYPE 2 DIABETES MELLITUS WITH COMPLICATION, WITHOUT LONG-TERM CURRENT USE OF INSULIN (HCC): ICD-10-CM

## 2020-09-01 LAB
ALBUMIN SERPL-MCNC: 3.5 G/DL (ref 3.4–5)
ALBUMIN/GLOB SERPL: 0.7 {RATIO} (ref 1–2)
ALP LIVER SERPL-CCNC: 66 U/L (ref 45–117)
ALT SERPL-CCNC: 17 U/L (ref 16–61)
ANION GAP SERPL CALC-SCNC: 5 MMOL/L (ref 0–18)
AST SERPL-CCNC: 13 U/L (ref 15–37)
BASOPHILS # BLD AUTO: 0.08 X10(3) UL (ref 0–0.2)
BASOPHILS NFR BLD AUTO: 1 %
BILIRUB SERPL-MCNC: 1.1 MG/DL (ref 0.1–2)
BUN BLD-MCNC: 17 MG/DL (ref 7–18)
BUN/CREAT SERPL: 15.9 (ref 10–20)
CALCIUM BLD-MCNC: 9.3 MG/DL (ref 8.5–10.1)
CHLORIDE SERPL-SCNC: 106 MMOL/L (ref 98–112)
CHOLEST SMN-MCNC: 165 MG/DL (ref ?–200)
CO2 SERPL-SCNC: 28 MMOL/L (ref 21–32)
CREAT BLD-MCNC: 1.07 MG/DL (ref 0.7–1.3)
CREAT UR-SCNC: 110 MG/DL
DEPRECATED RDW RBC AUTO: 46.5 FL (ref 35.1–46.3)
EOSINOPHIL # BLD AUTO: 0.17 X10(3) UL (ref 0–0.7)
EOSINOPHIL NFR BLD AUTO: 2 %
ERYTHROCYTE [DISTWIDTH] IN BLOOD BY AUTOMATED COUNT: 14.3 % (ref 11–15)
EST. AVERAGE GLUCOSE BLD GHB EST-MCNC: 131 MG/DL (ref 68–126)
GLOBULIN PLAS-MCNC: 4.8 G/DL (ref 2.8–4.4)
GLUCOSE BLD-MCNC: 88 MG/DL (ref 70–99)
HBA1C MFR BLD HPLC: 6.2 % (ref ?–5.7)
HCT VFR BLD AUTO: 42 % (ref 39–53)
HDLC SERPL-MCNC: 39 MG/DL (ref 40–59)
HGB BLD-MCNC: 13.7 G/DL (ref 13–17.5)
IMM GRANULOCYTES # BLD AUTO: 0.02 X10(3) UL (ref 0–1)
IMM GRANULOCYTES NFR BLD: 0.2 %
LDLC SERPL CALC-MCNC: 96 MG/DL (ref ?–100)
LYMPHOCYTES # BLD AUTO: 2.73 X10(3) UL (ref 1–4)
LYMPHOCYTES NFR BLD AUTO: 32.6 %
M PROTEIN MFR SERPL ELPH: 8.3 G/DL (ref 6.4–8.2)
MCH RBC QN AUTO: 28.8 PG (ref 26–34)
MCHC RBC AUTO-ENTMCNC: 32.6 G/DL (ref 31–37)
MCV RBC AUTO: 88.4 FL (ref 80–100)
MICROALBUMIN UR-MCNC: 3.34 MG/DL
MICROALBUMIN/CREAT 24H UR-RTO: 30.4 UG/MG (ref ?–30)
MONOCYTES # BLD AUTO: 0.62 X10(3) UL (ref 0.1–1)
MONOCYTES NFR BLD AUTO: 7.4 %
NEUTROPHILS # BLD AUTO: 4.75 X10 (3) UL (ref 1.5–7.7)
NEUTROPHILS # BLD AUTO: 4.75 X10(3) UL (ref 1.5–7.7)
NEUTROPHILS NFR BLD AUTO: 56.8 %
NONHDLC SERPL-MCNC: 126 MG/DL (ref ?–130)
OSMOLALITY SERPL CALC.SUM OF ELEC: 289 MOSM/KG (ref 275–295)
PATIENT FASTING Y/N/NP: YES
PATIENT FASTING Y/N/NP: YES
PLATELET # BLD AUTO: 262 10(3)UL (ref 150–450)
POTASSIUM SERPL-SCNC: 4.3 MMOL/L (ref 3.5–5.1)
RBC # BLD AUTO: 4.75 X10(6)UL (ref 3.8–5.8)
SODIUM SERPL-SCNC: 139 MMOL/L (ref 136–145)
TRIGL SERPL-MCNC: 149 MG/DL (ref 30–149)
TSI SER-ACNC: 0.82 MIU/ML (ref 0.36–3.74)
VIT B12 SERPL-MCNC: 236 PG/ML (ref 193–986)
VLDLC SERPL CALC-MCNC: 30 MG/DL (ref 0–30)
WBC # BLD AUTO: 8.4 X10(3) UL (ref 4–11)

## 2020-09-01 PROCEDURE — 80061 LIPID PANEL: CPT

## 2020-09-01 PROCEDURE — 85025 COMPLETE CBC W/AUTO DIFF WBC: CPT

## 2020-09-01 PROCEDURE — 82043 UR ALBUMIN QUANTITATIVE: CPT

## 2020-09-01 PROCEDURE — 80053 COMPREHEN METABOLIC PANEL: CPT

## 2020-09-01 PROCEDURE — 82570 ASSAY OF URINE CREATININE: CPT

## 2020-09-01 PROCEDURE — 82607 VITAMIN B-12: CPT

## 2020-09-01 PROCEDURE — 84443 ASSAY THYROID STIM HORMONE: CPT

## 2020-09-01 PROCEDURE — 36415 COLL VENOUS BLD VENIPUNCTURE: CPT

## 2020-09-01 PROCEDURE — 83036 HEMOGLOBIN GLYCOSYLATED A1C: CPT

## 2020-10-26 ENCOUNTER — TELEPHONE (OUTPATIENT)
Dept: OTHER | Age: 75
End: 2020-10-26

## 2020-10-26 NOTE — TELEPHONE ENCOUNTER
Received call from [facility] (P: [phone]) from Dr. Nikia Elliott office with Neurology. Currently he is out of network with the patient's plan. He is not supposed to be out of network and they are working on that.  However, insurance is not paying for a

## 2020-10-27 RX ORDER — DONEPEZIL HYDROCHLORIDE 10 MG/1
10 TABLET, FILM COATED ORAL NIGHTLY
Qty: 30 TABLET | Refills: 0 | Status: SHIPPED | OUTPATIENT
Start: 2020-10-27 | End: 2020-12-02

## 2020-10-27 RX ORDER — LORAZEPAM 0.5 MG/1
0.5 TABLET ORAL NIGHTLY PRN
Qty: 30 TABLET | Refills: 0 | Status: SHIPPED | OUTPATIENT
Start: 2020-10-27 | End: 2021-09-30 | Stop reason: ALTCHOICE

## 2020-10-27 RX ORDER — QUETIAPINE 50 MG/1
50 TABLET, FILM COATED ORAL 2 TIMES DAILY
Qty: 60 TABLET | Refills: 0 | Status: SHIPPED | OUTPATIENT
Start: 2020-10-27 | End: 2020-12-02

## 2020-10-27 NOTE — TELEPHONE ENCOUNTER
[employer] calling because patient complained they are unable to get meds. She states that Dr. Dannielle Villagran office told her they faxed their notes yesterday. [address] [place] office to confirm if fax was received. Spoke with Vito.  She stated they receive

## 2020-10-27 NOTE — TELEPHONE ENCOUNTER
Please inform Jennifer Schaffer, patient care coordinator at phone number [phone] but I have refilled medications for 1 month on behalf of Dr. Cesar Vargas.   These medications include Aricept, Seroquel and lorazepam.  Patient needs follow-up with neuropsy

## 2020-10-27 NOTE — TELEPHONE ENCOUNTER
Spoke with Josias Cavanaugh since Latonia Joseph was not available informed her of message below she stated pt did see neurologist this month and has another appointment scheduled already

## 2020-10-28 NOTE — TELEPHONE ENCOUNTER
[employer] calling to follow up on if the medications were sent. I read the message below and stated that [facility] was informed. She stated thank you just wanted to make sure they were sent.

## 2020-11-09 ENCOUNTER — MED REC SCAN ONLY (OUTPATIENT)
Dept: FAMILY MEDICINE CLINIC | Facility: CLINIC | Age: 75
End: 2020-11-09

## 2020-12-01 NOTE — TELEPHONE ENCOUNTER
Per pharmacy pt is requesting refill for following medications. •  Donepezil HCl (ARICEPT) 10 MG Oral Tab, Take 1 tablet (10 mg total) by mouth nightly.  As per Dr Jaja Ferrera, Disp: 30 tablet, Rfl: 0    •  QUEtiapine Fumarate (SEROQUEL) 50 MG Oral Tab,

## 2020-12-02 RX ORDER — QUETIAPINE 50 MG/1
50 TABLET, FILM COATED ORAL 2 TIMES DAILY
Qty: 60 TABLET | Refills: 0 | Status: SHIPPED | OUTPATIENT
Start: 2020-12-02

## 2020-12-02 RX ORDER — DONEPEZIL HYDROCHLORIDE 10 MG/1
10 TABLET, FILM COATED ORAL NIGHTLY
Qty: 30 TABLET | Refills: 0 | Status: SHIPPED | OUTPATIENT
Start: 2020-12-02 | End: 2021-11-27

## 2020-12-03 NOTE — TELEPHONE ENCOUNTER
Attempted to call the patient and was unable. No voicemail box as been set up.              Gita Samuels MD 21 hours ago (7:55 PM)        PATIENT WAS TO FOLLOW UP WITH NEUROPSYCHOLOGY  DID HE FOLLOW UP WITH THEM PER DR Mercy Sebastian (NEUROLOGY)

## 2020-12-03 NOTE — TELEPHONE ENCOUNTER
PATIENT WAS TO FOLLOW UP WITH NEUROPSYCHOLOGY  DID HE FOLLOW UP WITH THEM PER DR Boom Juarez (NEUROLOGY)

## 2020-12-22 ENCOUNTER — TELEPHONE (OUTPATIENT)
Dept: FAMILY MEDICINE CLINIC | Facility: CLINIC | Age: 75
End: 2020-12-22

## 2020-12-22 NOTE — TELEPHONE ENCOUNTER
Received order for incontinence  supplies from [facility] order signed by Dr. Sharlet Boxer and faxed back successful.

## 2021-01-10 DIAGNOSIS — F03.91 DEMENTIA WITH BEHAVIORAL DISTURBANCE, UNSPECIFIED DEMENTIA TYPE: ICD-10-CM

## 2021-01-11 RX ORDER — QUETIAPINE FUMARATE 25 MG/1
TABLET, FILM COATED ORAL
Qty: 30 TABLET | Refills: 3 | OUTPATIENT
Start: 2021-01-11

## 2021-01-11 RX ORDER — DONEPEZIL HYDROCHLORIDE 5 MG/1
TABLET, FILM COATED ORAL
Qty: 30 TABLET | Refills: 3 | OUTPATIENT
Start: 2021-01-11

## 2021-01-21 RX ORDER — DONEPEZIL HYDROCHLORIDE 10 MG/1
TABLET, FILM COATED ORAL
Qty: 30 TABLET | Refills: 0 | OUTPATIENT
Start: 2021-01-21

## 2021-01-29 DIAGNOSIS — E66.9 DIABETES MELLITUS TYPE 2 IN OBESE: ICD-10-CM

## 2021-01-29 DIAGNOSIS — E11.69 DIABETES MELLITUS TYPE 2 IN OBESE: ICD-10-CM

## 2021-02-01 DIAGNOSIS — Z23 NEED FOR VACCINATION: ICD-10-CM

## 2021-05-03 DIAGNOSIS — E11.69 DIABETES MELLITUS TYPE 2 IN OBESE (HCC): ICD-10-CM

## 2021-05-03 DIAGNOSIS — E66.9 DIABETES MELLITUS TYPE 2 IN OBESE (HCC): ICD-10-CM

## 2021-05-22 DIAGNOSIS — E11.69 DIABETES MELLITUS TYPE 2 IN OBESE: ICD-10-CM

## 2021-05-22 DIAGNOSIS — E66.9 DIABETES MELLITUS TYPE 2 IN OBESE: ICD-10-CM

## 2021-08-02 ENCOUNTER — NURSE TRIAGE (OUTPATIENT)
Dept: FAMILY MEDICINE CLINIC | Facility: CLINIC | Age: 76
End: 2021-08-02

## 2021-08-02 NOTE — TELEPHONE ENCOUNTER
Action Requested: Summary for Provider     []  Critical Lab, Recommendations Needed  [] Need Additional Advice  [x]   FYI    []   Need Orders  [] Need Medications Sent to Pharmacy  []  Other     SUMMARY: More confusion, Not eating, drinking, ambulating.  Re

## 2021-08-04 NOTE — TELEPHONE ENCOUNTER
Spoke to daughter Pt, got better s/s resolved after encouraging fluid/food, stated Pt started coughing , and coughed up blood(clotted) x 1 yesterday and saved the content  , asking what to do with it , advised to monitor s/s of coughing blood up, if she do

## 2021-08-04 NOTE — TELEPHONE ENCOUNTER
Informed daughter of advice per Dr. Emma Ireland. States patient only had the one episode of a blood clot and is doing better. Informed daughter again, that this is what Dr. Emma Ireland is recommending.   Daughter states she will call her mother to follow up with her

## 2021-08-19 ENCOUNTER — TELEPHONE (OUTPATIENT)
Dept: FAMILY MEDICINE CLINIC | Facility: CLINIC | Age: 76
End: 2021-08-19

## 2021-08-19 NOTE — TELEPHONE ENCOUNTER
Spoke with daughter Renay--asking for appt for physical with Dr. Sherwood Closs asymptomatic at this time. Relayed to daughter patient had appt for physical yesterday--she thought appt was today.  Transferred to [facility] to assist with rescheduling physical.

## 2021-09-30 ENCOUNTER — LAB ENCOUNTER (OUTPATIENT)
Dept: LAB | Age: 76
End: 2021-09-30
Attending: FAMILY MEDICINE
Payer: MEDICAID

## 2021-09-30 ENCOUNTER — OFFICE VISIT (OUTPATIENT)
Dept: FAMILY MEDICINE CLINIC | Facility: CLINIC | Age: 76
End: 2021-09-30
Payer: MEDICAID

## 2021-09-30 VITALS
OXYGEN SATURATION: 97 % | WEIGHT: 180 LBS | HEIGHT: 62 IN | HEART RATE: 97 BPM | SYSTOLIC BLOOD PRESSURE: 140 MMHG | BODY MASS INDEX: 33.13 KG/M2 | DIASTOLIC BLOOD PRESSURE: 80 MMHG | TEMPERATURE: 97 F

## 2021-09-30 DIAGNOSIS — F03.91 DEMENTIA WITH BEHAVIORAL DISTURBANCE, UNSPECIFIED DEMENTIA TYPE (HCC): ICD-10-CM

## 2021-09-30 DIAGNOSIS — Z85.810 HISTORY OF TONGUE CANCER: ICD-10-CM

## 2021-09-30 DIAGNOSIS — E53.8 VITAMIN B12 DEFICIENCY: ICD-10-CM

## 2021-09-30 DIAGNOSIS — E66.9 OBESITY (BMI 30-39.9): ICD-10-CM

## 2021-09-30 DIAGNOSIS — I10 ESSENTIAL HYPERTENSION: ICD-10-CM

## 2021-09-30 DIAGNOSIS — Z00.00 WELL ADULT EXAM: ICD-10-CM

## 2021-09-30 DIAGNOSIS — Z23 NEED FOR PNEUMOCOCCAL VACCINATION: ICD-10-CM

## 2021-09-30 DIAGNOSIS — Z23 NEED FOR SHINGLES VACCINE: ICD-10-CM

## 2021-09-30 DIAGNOSIS — Z00.00 WELL ADULT EXAM: Primary | ICD-10-CM

## 2021-09-30 DIAGNOSIS — E11.8 CONTROLLED TYPE 2 DIABETES MELLITUS WITH COMPLICATION, WITHOUT LONG-TERM CURRENT USE OF INSULIN (HCC): ICD-10-CM

## 2021-09-30 PROBLEM — R41.3 MEMORY LOSS: Status: RESOLVED | Noted: 2018-02-14 | Resolved: 2021-09-30

## 2021-09-30 PROBLEM — R32 URINARY INCONTINENCE: Status: RESOLVED | Noted: 2019-03-21 | Resolved: 2021-09-30

## 2021-09-30 PROCEDURE — 3079F DIAST BP 80-89 MM HG: CPT | Performed by: FAMILY MEDICINE

## 2021-09-30 PROCEDURE — 82607 VITAMIN B-12: CPT

## 2021-09-30 PROCEDURE — 90471 IMMUNIZATION ADMIN: CPT | Performed by: FAMILY MEDICINE

## 2021-09-30 PROCEDURE — 36415 COLL VENOUS BLD VENIPUNCTURE: CPT

## 2021-09-30 PROCEDURE — 3077F SYST BP >= 140 MM HG: CPT | Performed by: FAMILY MEDICINE

## 2021-09-30 PROCEDURE — 3008F BODY MASS INDEX DOCD: CPT | Performed by: FAMILY MEDICINE

## 2021-09-30 PROCEDURE — 85025 COMPLETE CBC W/AUTO DIFF WBC: CPT

## 2021-09-30 PROCEDURE — 84443 ASSAY THYROID STIM HORMONE: CPT

## 2021-09-30 PROCEDURE — 83036 HEMOGLOBIN GLYCOSYLATED A1C: CPT

## 2021-09-30 PROCEDURE — 80053 COMPREHEN METABOLIC PANEL: CPT

## 2021-09-30 PROCEDURE — 99397 PER PM REEVAL EST PAT 65+ YR: CPT | Performed by: FAMILY MEDICINE

## 2021-09-30 PROCEDURE — 90662 IIV NO PRSV INCREASED AG IM: CPT | Performed by: FAMILY MEDICINE

## 2021-09-30 NOTE — PROGRESS NOTES
HPI:    Patient ID: Tanesha Rivera is a 68year old male.     HPI  Patient presents with:  Routine Physical    Wt Readings from Last 6 Encounters:  09/30/21 : 180 lb (81.6 kg)  08/27/20 : 186 lb (84.4 kg)  01/14/20 : 196 lb (88.9 kg)  01/04/20 : 180 lb (81.6 Pulse 97   Temp 97.1 °F (36.2 °C) (Temporal)   Ht 5' 2\" (1.575 m)   Wt 180 lb (81.6 kg)   SpO2 97%   BMI 32.92 kg/m²     Past Medical History:   Diagnosis Date   • Dementia ([facility] [address]    • Incontinence of feces 1/14/2020     Past Surgical History:   Proc Violence:       Fear of Current or Ex-Partner: Not on file      Emotionally Abused: Not on file      Physically Abused: Not on file      Sexually Abused: Not on file  Housing Stability:       Unable to Pay for Housing in the Last Year: Not on file      Num atraumatic. Right Ear: External ear normal.      Left Ear: External ear normal.      Nose: Nose normal.      Mouth/Throat:      Pharynx: No oropharyngeal exudate. Eyes:      General:         Right eye: No discharge. Left eye: No discharge. deficiency  History of tongue cancer  Controlled type 2 diabetes mellitus with complication, without long-term current use of insulin (hcc)  Need for pneumococcal vaccination  Need for shingles vaccine    1.  Well adult exam    - CBC WITH DIFFERENTIAL WITH

## 2021-10-04 ENCOUNTER — TELEPHONE (OUTPATIENT)
Dept: CASE MANAGEMENT | Age: 76
End: 2021-10-04

## 2021-10-04 NOTE — TELEPHONE ENCOUNTER
It was a Arm blood pressure cuff large adult diagnosis hypertension  For a prescription for pharmacy

## 2021-10-04 NOTE — TELEPHONE ENCOUNTER
Makeda Perkins,    Do you have a specific DME you would like me to use?      Thank you,  [facility]   Referral specialist

## 2021-10-09 DIAGNOSIS — E53.8 VITAMIN B12 DEFICIENCY: Primary | ICD-10-CM

## 2021-10-09 DIAGNOSIS — R97.20 ELEVATED PSA: ICD-10-CM

## 2021-10-25 ENCOUNTER — TELEPHONE (OUTPATIENT)
Dept: FAMILY MEDICINE CLINIC | Facility: CLINIC | Age: 76
End: 2021-10-25

## 2021-10-25 NOTE — TELEPHONE ENCOUNTER
Received order from [address] [place] [address] for urinary incontinence supplies order signed by Dr. Trip Greenberg and faxed back.

## 2021-11-18 DIAGNOSIS — I10 ESSENTIAL HYPERTENSION: ICD-10-CM

## 2021-11-18 RX ORDER — LISINOPRIL 2.5 MG/1
2.5 TABLET ORAL DAILY
Qty: 90 TABLET | Refills: 1 | Status: SHIPPED | OUTPATIENT
Start: 2021-11-18

## 2021-11-18 NOTE — TELEPHONE ENCOUNTER
Refill passed per [facility] protocol.    Requested Prescriptions   Pending Prescriptions Disp Refills    LISINOPRIL 2.5 MG Oral Tab [Pharmacy Med Name: LISINOPRIL 2.5MG TABLETS] 90 tablet 3     Sig: TAKE 1 TABLET(2.5 MG) BY MOUTH DAILY        [employer]

## 2021-12-20 ENCOUNTER — LAB ENCOUNTER (OUTPATIENT)
Dept: LAB | Facility: HOSPITAL | Age: 76
End: 2021-12-20
Attending: UROLOGY
Payer: MEDICAID

## 2021-12-20 ENCOUNTER — OFFICE VISIT (OUTPATIENT)
Dept: SURGERY | Facility: CLINIC | Age: 76
End: 2021-12-20
Payer: MEDICAID

## 2021-12-20 VITALS
SYSTOLIC BLOOD PRESSURE: 143 MMHG | DIASTOLIC BLOOD PRESSURE: 82 MMHG | WEIGHT: 185 LBS | HEIGHT: 65 IN | BODY MASS INDEX: 30.82 KG/M2 | HEART RATE: 83 BPM

## 2021-12-20 DIAGNOSIS — N40.1 BPH WITH OBSTRUCTION/LOWER URINARY TRACT SYMPTOMS: Primary | ICD-10-CM

## 2021-12-20 DIAGNOSIS — Z00.00 WELL ADULT EXAM: ICD-10-CM

## 2021-12-20 DIAGNOSIS — N13.8 BPH WITH OBSTRUCTION/LOWER URINARY TRACT SYMPTOMS: Primary | ICD-10-CM

## 2021-12-20 DIAGNOSIS — E11.8 CONTROLLED TYPE 2 DIABETES MELLITUS WITH COMPLICATION, WITHOUT LONG-TERM CURRENT USE OF INSULIN (HCC): ICD-10-CM

## 2021-12-20 DIAGNOSIS — R97.20 ELEVATED PSA: ICD-10-CM

## 2021-12-20 PROCEDURE — 3008F BODY MASS INDEX DOCD: CPT | Performed by: UROLOGY

## 2021-12-20 PROCEDURE — 81001 URINALYSIS AUTO W/SCOPE: CPT | Performed by: UROLOGY

## 2021-12-20 PROCEDURE — 36415 COLL VENOUS BLD VENIPUNCTURE: CPT

## 2021-12-20 PROCEDURE — 3079F DIAST BP 80-89 MM HG: CPT | Performed by: UROLOGY

## 2021-12-20 PROCEDURE — 87086 URINE CULTURE/COLONY COUNT: CPT

## 2021-12-20 PROCEDURE — 87186 SC STD MICRODIL/AGAR DIL: CPT

## 2021-12-20 PROCEDURE — 87077 CULTURE AEROBIC IDENTIFY: CPT

## 2021-12-20 PROCEDURE — 82043 UR ALBUMIN QUANTITATIVE: CPT

## 2021-12-20 PROCEDURE — 80061 LIPID PANEL: CPT

## 2021-12-20 PROCEDURE — 3077F SYST BP >= 140 MM HG: CPT | Performed by: UROLOGY

## 2021-12-20 PROCEDURE — 82570 ASSAY OF URINE CREATININE: CPT

## 2021-12-20 PROCEDURE — 99244 OFF/OP CNSLTJ NEW/EST MOD 40: CPT | Performed by: UROLOGY

## 2021-12-20 RX ORDER — TAMSULOSIN HYDROCHLORIDE 0.4 MG/1
0.4 CAPSULE ORAL DAILY
Qty: 90 CAPSULE | Refills: 3 | Status: SHIPPED | OUTPATIENT
Start: 2021-12-20 | End: 2022-01-19

## 2021-12-20 NOTE — PROGRESS NOTES
SUBJECTIVE:  Bello Wells is a 68year old male who presents for a consultation at the request of, and a copy of this note will be sent to, Dr. Shreya Glass, for evaluation of  benign prostatic hyperplasia and elevated PSA.  He states that the problem is [place] bloody or tarry stools. GENERAL: Denies:  weight gain, weight loss, fever, night sweats, bone pain, malaise and fatigue.  Positive for:  None  All other review of systems reviewed and are otherwise negative    OBJECTIVE:  /82 (BP Location: Right arm,

## 2021-12-22 ENCOUNTER — TELEPHONE (OUTPATIENT)
Dept: SURGERY | Facility: CLINIC | Age: 76
End: 2021-12-22

## 2021-12-22 NOTE — TELEPHONE ENCOUNTER
Spoke with [address] emergency contact and notified her that pt urine culture is positive and that antibiotic has been sent to pharmacy. Verbalized understanding.

## 2021-12-22 NOTE — TELEPHONE ENCOUNTER
----- Message from Juan Francisco Saavedra MD sent at 12/22/2021  1:41 PM CST -----  Urine culture shows a UTI. Please call pt. I sent a RX for Bactrim. Ask that he start today.   Thanks

## 2022-01-06 ENCOUNTER — TELEPHONE (OUTPATIENT)
Dept: FAMILY MEDICINE CLINIC | Facility: CLINIC | Age: 77
End: 2022-01-06

## 2022-01-06 NOTE — TELEPHONE ENCOUNTER
LVM to call back wanted to inform him of message below    Bhavesh Fuller MD  P Em Fm [facility]ette L[facility]/Cma  Cholesterol well controlled   Some protein leakage in urine   Continue lisinopril and good control of blood sugars and blood pressure.    Please inform daughter

## 2022-01-31 ENCOUNTER — OFFICE VISIT (OUTPATIENT)
Dept: SURGERY | Facility: CLINIC | Age: 77
End: 2022-01-31
Payer: MEDICAID

## 2022-01-31 DIAGNOSIS — R97.20 ELEVATED PSA: ICD-10-CM

## 2022-01-31 DIAGNOSIS — N13.8 BPH WITH OBSTRUCTION/LOWER URINARY TRACT SYMPTOMS: Primary | ICD-10-CM

## 2022-01-31 DIAGNOSIS — N40.1 BPH WITH OBSTRUCTION/LOWER URINARY TRACT SYMPTOMS: Primary | ICD-10-CM

## 2022-01-31 PROCEDURE — 99213 OFFICE O/P EST LOW 20 MIN: CPT | Performed by: UROLOGY

## 2022-01-31 PROCEDURE — 51741 ELECTRO-UROFLOWMETRY FIRST: CPT | Performed by: UROLOGY

## 2022-01-31 PROCEDURE — 51798 US URINE CAPACITY MEASURE: CPT | Performed by: UROLOGY

## 2022-01-31 NOTE — PROGRESS NOTES
Jose Eduardo Vicente is a 68year old male.     HPI:   Patient presents with:  Urinary Symptoms: uroflow/bladder scan      59-year-old male accompanied with his daughter with a history of progressive dementia poorly oriented x3 in follow-up to a consult lisha 2 (two) times daily.  As per Dr Wilbert Escalante 60 tablet 0   • Blood Glucose Monitoring Suppl (ACCU-CHEK ABRAM PLUS) w/Device Does not apply Kit Check sugars daily as needed 1 kit 0   • Glucose Blood (ACCU-CHEK ABRAM PLUS) In Vitro Strip Check sugars daily as

## 2022-05-03 ENCOUNTER — HOSPITAL ENCOUNTER (INPATIENT)
Facility: HOSPITAL | Age: 77
LOS: 6 days | Discharge: HOME HEALTH | End: 2022-05-09
Attending: EMERGENCY MEDICINE | Admitting: HOSPITALIST
Payer: MEDICAID

## 2022-05-03 DIAGNOSIS — N30.00 ACUTE CYSTITIS WITHOUT HEMATURIA: ICD-10-CM

## 2022-05-03 DIAGNOSIS — R65.20 SEVERE SEPSIS (HCC): Primary | ICD-10-CM

## 2022-05-03 DIAGNOSIS — A41.9 SEVERE SEPSIS (HCC): Primary | ICD-10-CM

## 2022-05-03 PROBLEM — N39.0 SEPSIS DUE TO URINARY TRACT INFECTION: Status: ACTIVE | Noted: 2022-05-03

## 2022-05-03 PROBLEM — N39.0 SEPSIS DUE TO URINARY TRACT INFECTION (HCC): Status: ACTIVE | Noted: 2022-05-03

## 2022-05-03 PROBLEM — N39.0 SEPSIS DUE TO URINARY TRACT INFECTION  (HCC): Status: ACTIVE | Noted: 2022-05-03

## 2022-05-03 LAB
ANION GAP SERPL CALC-SCNC: 9 MMOL/L (ref 0–18)
BASOPHILS # BLD AUTO: 0.02 X10(3) UL (ref 0–0.2)
BASOPHILS NFR BLD AUTO: 0.1 %
BILIRUB UR QL: NEGATIVE
BUN BLD-MCNC: 31 MG/DL (ref 7–18)
BUN/CREAT SERPL: 19.5 (ref 10–20)
CALCIUM BLD-MCNC: 9.4 MG/DL (ref 8.5–10.1)
CHLORIDE SERPL-SCNC: 102 MMOL/L (ref 98–112)
CO2 SERPL-SCNC: 25 MMOL/L (ref 21–32)
COLOR UR: YELLOW
CREAT BLD-MCNC: 1.59 MG/DL
DEPRECATED RDW RBC AUTO: 46.5 FL (ref 35.1–46.3)
EOSINOPHIL # BLD AUTO: 0 X10(3) UL (ref 0–0.7)
EOSINOPHIL NFR BLD AUTO: 0 %
ERYTHROCYTE [DISTWIDTH] IN BLOOD BY AUTOMATED COUNT: 14.6 % (ref 11–15)
GLUCOSE BLD-MCNC: 143 MG/DL (ref 70–99)
GLUCOSE BLDC GLUCOMTR-MCNC: 105 MG/DL (ref 70–99)
GLUCOSE UR-MCNC: NEGATIVE MG/DL
HCT VFR BLD AUTO: 46.7 %
HGB BLD-MCNC: 14.9 G/DL
IMM GRANULOCYTES # BLD AUTO: 0.13 X10(3) UL (ref 0–1)
IMM GRANULOCYTES NFR BLD: 0.7 %
KETONES UR-MCNC: NEGATIVE MG/DL
LACTATE SERPL-SCNC: 2 MMOL/L (ref 0.4–2)
LACTATE SERPL-SCNC: 2.1 MMOL/L (ref 0.4–2)
LYMPHOCYTES # BLD AUTO: 0.74 X10(3) UL (ref 1–4)
LYMPHOCYTES NFR BLD AUTO: 4 %
MCH RBC QN AUTO: 27.7 PG (ref 26–34)
MCHC RBC AUTO-ENTMCNC: 31.9 G/DL (ref 31–37)
MCV RBC AUTO: 86.8 FL
MONOCYTES # BLD AUTO: 1.87 X10(3) UL (ref 0.1–1)
MONOCYTES NFR BLD AUTO: 10.1 %
NEUTROPHILS # BLD AUTO: 15.75 X10 (3) UL (ref 1.5–7.7)
NEUTROPHILS # BLD AUTO: 15.75 X10(3) UL (ref 1.5–7.7)
NEUTROPHILS NFR BLD AUTO: 85.1 %
NITRITE UR QL STRIP.AUTO: POSITIVE
OSMOLALITY SERPL CALC.SUM OF ELEC: 291 MOSM/KG (ref 275–295)
PH UR: 6 [PH] (ref 5–8)
PLATELET # BLD AUTO: 260 10(3)UL (ref 150–450)
POTASSIUM SERPL-SCNC: 3.6 MMOL/L (ref 3.5–5.1)
PROCALCITONIN SERPL-MCNC: 0.21 NG/ML (ref ?–0.16)
PROT UR-MCNC: 100 MG/DL
RBC # BLD AUTO: 5.38 X10(6)UL
RBC #/AREA URNS AUTO: >10 /HPF
SARS-COV-2 RNA RESP QL NAA+PROBE: NOT DETECTED
SODIUM SERPL-SCNC: 136 MMOL/L (ref 136–145)
SP GR UR STRIP: 1.02 (ref 1–1.03)
UROBILINOGEN UR STRIP-ACNC: <2
VIT C UR-MCNC: NEGATIVE MG/DL
WBC # BLD AUTO: 18.5 X10(3) UL (ref 4–11)
WBC #/AREA URNS AUTO: >50 /HPF
WBC CLUMPS UR QL AUTO: PRESENT /HPF

## 2022-05-03 PROCEDURE — 99222 1ST HOSP IP/OBS MODERATE 55: CPT | Performed by: HOSPITALIST

## 2022-05-03 RX ORDER — HEPARIN SODIUM 5000 [USP'U]/ML
5000 INJECTION, SOLUTION INTRAVENOUS; SUBCUTANEOUS EVERY 12 HOURS SCHEDULED
Status: DISCONTINUED | OUTPATIENT
Start: 2022-05-03 | End: 2022-05-09

## 2022-05-03 RX ORDER — ONDANSETRON 2 MG/ML
4 INJECTION INTRAMUSCULAR; INTRAVENOUS EVERY 6 HOURS PRN
Status: DISCONTINUED | OUTPATIENT
Start: 2022-05-03 | End: 2022-05-09

## 2022-05-03 RX ORDER — ACETAMINOPHEN 325 MG/1
650 TABLET ORAL EVERY 6 HOURS PRN
Status: DISCONTINUED | OUTPATIENT
Start: 2022-05-03 | End: 2022-05-04

## 2022-05-03 RX ORDER — SODIUM CHLORIDE 9 MG/ML
INJECTION, SOLUTION INTRAVENOUS CONTINUOUS
Status: DISCONTINUED | OUTPATIENT
Start: 2022-05-03 | End: 2022-05-09

## 2022-05-03 NOTE — ED INITIAL ASSESSMENT (HPI)
Pt to ED for c/o dysuria ad frequency, hx of dementia. Pt is Amharic speaking,  used for triage and assessment. Pt denies pain at this time.

## 2022-05-03 NOTE — ED QUICK NOTES
Orders for admission, patient is aware of plan and ready to go upstairs. Any questions, please call ED RN Vilma at [address]. Patient Covid vaccination status: Partially vaccinated     COVID Test Ordered in ED: Rapid SARS-CoV-2 by PCR    COVID Suspicion at Admission: N/A    Running Infusions:  None    Mental Status/LOC at time of transport: Alert    Other pertinent information:   Patient from home with son and family. Daughter states patient has had increased frequency in urination since Sunday. Patient's PCP is Dr. Ese Danielle. Please call daughter with any questions.   Daughter: Raine Rene [phone]  Pt is vegetarian

## 2022-05-03 NOTE — PROGRESS NOTES
[facility] Pharmacy Note: Antimicrobial Weight Based Dose Adjustment for: meropenem (Dilip Herbert)    Coby Chacon is a 68year old patient who has been prescribed meropenem (MERREM) 1000 g once    Estimated Creatinine Clearance: 34.4 mL/min (A) (based on SCr of 1.59 mg/dL (H)). Wt Readings from Last 6 Encounters:  12/20/21 : 83.9 kg (185 lb)  09/30/21 : 81.6 kg (180 lb)  08/27/20 : 84.4 kg (186 lb)  01/14/20 : 88.9 kg (196 lb)  01/04/20 : 81.6 kg (180 lb)  03/21/19 : 85.7 kg (189 lb)    There is no height or weight on file to calculate BMI. Based on this criteria and renal function, dose will be adjusted to meropenem (MERREM) 500 mg once.     Thank you,    Solo Luna, PharmD  5/3/2022  5:32 PM

## 2022-05-04 LAB
ANION GAP SERPL CALC-SCNC: 7 MMOL/L (ref 0–18)
BASOPHILS # BLD AUTO: 0.03 X10(3) UL (ref 0–0.2)
BASOPHILS NFR BLD AUTO: 0.2 %
BUN BLD-MCNC: 29 MG/DL (ref 7–18)
BUN/CREAT SERPL: 21.2 (ref 10–20)
CALCIUM BLD-MCNC: 8.3 MG/DL (ref 8.5–10.1)
CHLORIDE SERPL-SCNC: 107 MMOL/L (ref 98–112)
CO2 SERPL-SCNC: 25 MMOL/L (ref 21–32)
CREAT BLD-MCNC: 1.37 MG/DL
DEPRECATED RDW RBC AUTO: 46.4 FL (ref 35.1–46.3)
EOSINOPHIL # BLD AUTO: 0 X10(3) UL (ref 0–0.7)
EOSINOPHIL NFR BLD AUTO: 0 %
ERYTHROCYTE [DISTWIDTH] IN BLOOD BY AUTOMATED COUNT: 14.6 % (ref 11–15)
GLUCOSE BLD-MCNC: 109 MG/DL (ref 70–99)
HCT VFR BLD AUTO: 39.1 %
HGB BLD-MCNC: 12.6 G/DL
IMM GRANULOCYTES # BLD AUTO: 0.14 X10(3) UL (ref 0–1)
IMM GRANULOCYTES NFR BLD: 0.8 %
LYMPHOCYTES # BLD AUTO: 1.3 X10(3) UL (ref 1–4)
LYMPHOCYTES NFR BLD AUTO: 7.3 %
MCH RBC QN AUTO: 28 PG (ref 26–34)
MCHC RBC AUTO-ENTMCNC: 32.2 G/DL (ref 31–37)
MCV RBC AUTO: 86.9 FL
MONOCYTES # BLD AUTO: 1.45 X10(3) UL (ref 0.1–1)
MONOCYTES NFR BLD AUTO: 8.2 %
NEUTROPHILS # BLD AUTO: 14.81 X10 (3) UL (ref 1.5–7.7)
NEUTROPHILS # BLD AUTO: 14.81 X10(3) UL (ref 1.5–7.7)
NEUTROPHILS NFR BLD AUTO: 83.5 %
OSMOLALITY SERPL CALC.SUM OF ELEC: 294 MOSM/KG (ref 275–295)
PLATELET # BLD AUTO: 245 10(3)UL (ref 150–450)
POTASSIUM SERPL-SCNC: 3 MMOL/L (ref 3.5–5.1)
RBC # BLD AUTO: 4.5 X10(6)UL
SODIUM SERPL-SCNC: 139 MMOL/L (ref 136–145)
WBC # BLD AUTO: 17.7 X10(3) UL (ref 4–11)

## 2022-05-04 PROCEDURE — 99233 SBSQ HOSP IP/OBS HIGH 50: CPT | Performed by: HOSPITALIST

## 2022-05-04 RX ORDER — ACETAMINOPHEN 325 MG/1
650 TABLET ORAL EVERY 6 HOURS PRN
Status: DISCONTINUED | OUTPATIENT
Start: 2022-05-04 | End: 2022-05-09

## 2022-05-04 RX ORDER — QUETIAPINE FUMARATE 25 MG/1
50 TABLET, FILM COATED ORAL 2 TIMES DAILY
Status: DISCONTINUED | OUTPATIENT
Start: 2022-05-04 | End: 2022-05-09

## 2022-05-04 RX ORDER — POTASSIUM CHLORIDE 20 MEQ/1
40 TABLET, EXTENDED RELEASE ORAL EVERY 4 HOURS
Status: COMPLETED | OUTPATIENT
Start: 2022-05-04 | End: 2022-05-05

## 2022-05-04 NOTE — PROGRESS NOTES
[facility] Pharmacy Note:  Renal Adjustment for meropenem (MERREM)    Roberto Castillo is a 68year old patient who has been prescribed meropenem (MERREM) 500 mg every 8 hrs. The estimated creatinine clearance is 34.4 mL/min (A) (based on SCr of 1.59 mg/dL (H)). The dose has been adjusted to meropenem (MERREM) 500 mg every 12 hrs per hospital renal dose adjustment protocol for treatment of UTI. Pharmacy will follow and adjust dose as warranted for additional renal function changes.     Thank you,    Jesse Osborn, PharmD  5/3/2022  10:20 PM

## 2022-05-04 NOTE — CM/SW NOTE
05/04/22 1400   CM/SW Referral Data   Referral Source Social Work (self-referral)   Reason for Referral Discharge planning   Informant Daughter   Pertinent Medical Hx   Does patient have an established PCP? Yes   Patient Info   Patient's Current Mental Status at Time of Assessment Confused or unable to complete assessment   Patient Communication Issues Language barrier   Patient's [address]   Number of Levels in Home 2   Number of Stair in Home 16   Patient lives with Son   Patient Status Prior to Admission   Independent with ADLs and Mobility No   Pt. requires assistance with Housework;Driving; Bathing;Medications; Toileting;Finances   Services in place prior to admission [address]  (daughter is paid CG)   Discharge Needs   Anticipated D/C needs To be determined;Home health care     SW initiated self referral for DC planning. SW met with pt and his daughter [place] at bedside. Pt asleep during assessment. Daughter confirmed information above. Per dtr, pt was primarily independent with mobility PTA. Pt with HX of dementia. Pt is very forgetful. Does not drive. No DME utilized at home. Pt lives with his son, ASHLEY and 2 grandchildren. 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 No HX of HH or MANJIT. Rooha confirmed plan will be to return home with family when medically cleared. SW discussed safe DC plan options and offered education on [facility]. Rooha/dtr expressed interest and was agreeable to referral. [facility] referrals sent, F2F placed. PLAN: pending medical course - home with family, resume homemaker services (thru daughter), home health  Need:  - available [facility] agency & choice    SW remains available for support and/or discharge planning. Please do not hesitate to call/chat SW if further DC needs arise.      Elinor Andrade MSW, [facility]  Ext [phone]

## 2022-05-04 NOTE — PLAN OF CARE
Chief Complaint  Medication Management      Reason For Visit  Reason For Visit:   Patient presents for follow-up .   Chaperone: POORNIMA BARBOSA is unaccompanied.        History of Present Illness    CC: \"up and down\"    ID: 20yo female w/ a psych hx of BP II D/O, FRANK, Social Anxiety, Panic attacks and Cannabis Use D/O who presented today for a f/u appt    SUBJECTIVE:  Last seen,Oct 2017. Pt arrived to her appt on time.     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    Regarding her social anxiety, patient states this continues to remain near its baseline. She does detail 1 close friend she has started opening up with regarding her own personal and mental health issues. This friend is studying to be a  and patient feel safe and comfortable opening up. From my perspective patient also appears to becoming more comfortable opening up with author and doesn't did make some jokes and appears less hesitant to share details about her personal life today. She does continue to voice discomfort at befriending others however does detail an episode at work where she noticed a customer acting poorly and felt this was a \"hilarious\" scenario (yelling over waffles) and felt happy to be back in this environment where she is working with other people and able to laugh to herself and interact.     Regarding the agora phobia, patient details that she continues to isolate, not wanting to leave her room unless she has a recent such as work or doctors appointments. She does feel happy when she is at these other places and I suspect a lot of the avoidant behaviors today are depression related     Regarding the generalized anxiety disorder, patient states this continues to remain an active issue. She continues to have racing thoughts, anxiety, mostly during the day. She tried the Ativan a few times which she felt was too sedating for her. We discussed  Problem: Patient Centered Care  Goal: Patient preferences are identified and integrated in the patient's plan of care  Description: Interventions:  - What would you like us to know as we care for you? Poor historian due to dementia  - Provide timely, complete, and accurate information to patient/family  - Incorporate patient and family knowledge, values, beliefs, and cultural backgrounds into the planning and delivery of care  - Encourage patient/family to participate in care and decision-making at the level they choose  - Honor patient and family perspectives and choices  Outcome: Progressing     Problem: RISK FOR INFECTION - ADULT  Goal: Absence of fever/infection during anticipated neutropenic period  Description: INTERVENTIONS  - Monitor WBC  - Administer growth factors as ordered  - Implement neutropenic guidelines  Outcome: Progressing     Problem: SAFETY ADULT - FALL  Goal: Free from fall injury  Description: INTERVENTIONS:  - Assess pt frequently for physical needs  - Identify cognitive and physical deficits and behaviors that affect risk of falls.   - [place] fall precautions as indicated by assessment.  - Educate pt/family on patient safety including physical limitations  - Instruct pt to call for assistance with activity based on assessment  - Modify environment to reduce risk of injury  - Provide assistive devices as appropriate  - Consider OT/PT consult to assist with strengthening/mobility  - Encourage toileting schedule  Outcome: Progressing     Problem: PAIN - ADULT  Goal: Verbalizes/displays adequate comfort level or patient's stated pain goal  Description: INTERVENTIONS:  - Encourage pt to monitor pain and request assistance  - Assess pain using appropriate pain scale  - Administer analgesics based on type and severity of pain and evaluate response  - Implement non-pharmacological measures as appropriate and evaluate response  - Consider cultural and social influences on pain and pain management  - Manage/alleviate anxiety  - Utilize distraction and/or relaxation techniques  - Monitor for opioid side effects  - Notify MD/LIP if interventions unsuccessful or patient reports new pain  - Anticipate increased pain with activity and pre-medicate as appropriate  Outcome: Progressing     Problem: Altered Communication/Language Barrier  Goal: Patient/Family is able to understand and participate in their care  Description: Interventions:  - Assess communication ability and preferred communication style  - Implement communication aides and strategies  - Use visual cues when possible  - Listen attentively, be patient, do not interrupt  - Minimize distractions  - Allow time for understanding and response  - Establish method for patient to ask for assistance (call light)  - Provide an  as needed  - Communicate barriers and strategies to overcome with those who interact with patient  Outcome: Progressing     Problem: Impaired Cognition  Goal: Patient will exhibit improved attention, thought processing and/or memory  Description: Interventions:    Outcome: Progressing     Problem: Impaired Communication  Goal: Patient will achieve maximal communication potential  Description: Interventions:    Outcome: Progressing     Patient alert to self, unable to communicate due to language barrier, and language line use was unsuccessful. Continue IV fluids. IV antibx. Fall precaution. Encourage patient to eat. Alejo care. Monitor VS. Blood culture positive, repeat for tomorrow. Call light within reach. medication options and patient did want a rescue medicine, and had previously failed gabapentin. We discussed the benzodiazepines, different half-lives, and patient was amenable towards switching the Ativan to clonazepam    Regarding the bipolar, states she has been tolerating the Depakote very well. Continues to deny side effects such as sedation, increased appetite. Does admit to some residual low improving depressive symptoms including low energy, feeling down, lack of motivation, wanting to isolate. This reportedly correlated with the job and has been slowly improving as she has gotten a new job as well as as we started the Wellbutrin. She denies SI and contracted for safety. We discussed further optimizing the Wellbutrin once her body has had time to switch over rescue medicines for anxiety, and patient amenable.      BACKGROUND INFO:  Substance Use Hx:  Pt reported that she has not used marijuana since Sept 2016 which is an improvement for her. She has agreed to stop using it and also has avoided drinking alcohol. Prior to now, the pt was typically drinking on the weekends 1 beer and a mixed drink which was ~4x monthly). Pt has been counseled about the effects of alcohol and other substances and how they can interfere w/ her metabolism of her medications and adversely affect her mood. She voiced understanding. She denied any caffeine use or other stimulating substances.    Social hx:  Pt currently lives w/ her parents and 17yo brother. Reports a good relationship w/ her family. 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 She has a current boyfriend, whom she reports to be very supportive and caring. Denies any abuse concern.    Pmhx:  Iron deficiency anemia, currently on iron supplementation per PCP recs. (Pt reported some hair loss which may have been related to her iron deficiency,  this has improved since starting the supplementations. Plan to continue to monitor as Depakote can also contribute to alopecia. If It becomes a major concern later, may consider transitioning to Trileptal).  Pt planning to start birth control soon. Currently discussing options w/ her PCP.    ROS:  - Systemic: Denies fatigue, no recent weight change and no anorexia. No Fever or chills.  - Cardiovascular: no chest pain and no lightheadedness.   - Gastrointestinal: no abdominal pain, no nausea, no vomiting and no diarrhea.   - Neurological: no headache, no confusion and no generalized decrease in strength.   - Psychiatric: see hpi     OBJECTIVE:  Vitals/wt monitorin16: wt = 114lbs; BP: 113/67; HR: 79  16: wt = 116 lbs; BP: 121/75, HR: 82  16: wt = 116 lbs; BP: 122/93; HR: 114  16: wt = 121 lbs; BP: 112/70; HR: 64  2/10/17: wt = 118 lbs; BP: 109/73; HR: 89  17: /69 P 85 W 114 lbs  10/12/17: Blood pressure 111/76 pulse 99  17: BP: 127 77 P 81    MENTAL STATUS EXAM:  Appearance: Petite female who appears stated age of 20yo, dressed in casual clothing, Fair grooming/hygiene. Mild acne on face. Dyed black short elisabeth hair cut  Behavior/Engagement: Calm, cooperative  Eye Contact: Appropriate  Psychomotor: No PMR/PMA, no tics/tremors, or abnormal movements  Speech: RR, nl tone, non-pressured  Mood: \"up and down\"  Affect: euthymic, nl range, non-labile  Thought Process: linear, logical and goal directed  Thought Content: Denies SI/HI/obsessions/paranoid or delusional ideation.  Perceptions: no evidence of response to internal stimuli. Denies AVH  Insight: Good  Judgment: Good  Cognitive: A&O x 3, Memory intact (recent and remote). Good use of language and Good vocabulary. Normal fund of knowledge.    ASSESSMENT:  20yo female with a psych history of BP II D/O, FRANK, Social anxiety, FRANK, Panic attacks and hx of Cannabis Use D/O (in remission since ) who presented today for follow-up  appointment. Patient had ran out of her psychotropic medicine on her own and had not restarted it for a few months and had not followed up at the clinic. Patient was agreeable to trying a new regimen to help control the danielle, depression, and anxiety. No SI/HI or AVH. Agreeable to follow-up for medication management and support. Titrating depakote which now appears optimal. Optimizing antidepressant and anxiolytic.    Diagnoses:  BP II D/O (most recent episode depressed. stable)  FRANK (improving)  Social anxiety (active sxs, slight improvement)  Agoraphobia  Panic attacks (stable)  Cannabis Use D/O (in remission since 9/16)  Rule out PTSD?    PLAN:  -Continue Depakote to 1000mg PO qhs Extended release . Patient had ran out of this medicine on her own and had not restarted it for a few months. Patient felt it was helpful with her history of danielle but not with the anxiety or depression . We tried lamictal which was not able to be tolerated. Discussed r/b/e of treatment with pt, along with the option of no treatment vs. alternative treatment(s) .patient was advised of the risk of fetal malformations of Depakote and while she has IUD, she is thinking about possibly having kids one day. Discussed prophylactic folic acid treatment and pt was not agreeable to folic acid at this time given past tolerability issues (nausea).     -Labs: Depakote level 10/12 was 70, well within range    -Stop Lamictal . Patient had tried 2 doses but noticed significant insomnia and stopped on her own    -Discussed lithium with pt who referred Depakote at this time    - Continue Vit D Supplementation. Most recent labs at a healthy level and 30s    - Restart Wellbutrin  mg for depression. Increase to 300mg in two weeks. Discussed r/b/e of treatment with pt, along with the option of no treatment vs. alternative treatment(s). Specifically discussed the risk of activation, insomnia, decreased seizure threshold. Discussed how we could try to  give 1 medicine to treat both depression and anxiety but patient had done well on Wellbutrin before and preferred going back to this    -Replace Ativan 0.5 mg for clonazepam 0.25mg when necessary daily for anxiety given sedation on ativan. Discussed r/b/e of treatment with pt, along with the option of no treatment vs. alternative treatment(s). Discussed with patient specifically risk of tolerance, falls, sedation, danger combining with alcohol, dementia and confusion.    - Discussed birth control and birth defect risks of meds. Can also consider folic acid supplementation if warranted. Currently has IUD    - No safety concerns at this time, pt denies SI/HI and is able to care for basic needs.    - Pt. informed to contact family members, psychiatrist, dial 911 or seek emergent psychiatric evaluation given occurrence of new or worsening sxs including safety concerns such as danger to self, others, significant medication SE's or inability to self care.     - Pt to follow up in ~4 weeks for 30min appt.     -Face to face time: approximately 20 minutes    Volodymyr Rod D.O.  PGY3 Psychiatry  Please perfectserve with questions.          Allergies   1. Nickel    Plan   · BuPROPion HCl ER (XL) 300 MG Oral Tablet Extended Release 24 Hour; TAKE 1  TABLET DAILY first dose 12/1   Rx By: VOLODYMYR ROD; Dispense: 30 Days ; #:30 Tablet Extended Release 24 Hour; Refill: 0; MIRZA = N; Verified Transmission to [facility]; Last Updated By: System, [facility]; 11/16/2017 3:19:20 PM   · ClonazePAM 0.5 MG Oral Tablet; TAKE 1/2 TABLET DAILY AS NEEDED FOR  ANXIETY   Rx By: VOLODYMYR ROD; Dispense: 30 Days ; #:30 Tablet; Refill: 0; MIRZA = N; Print Rx   · BuPROPion HCl ER (XL) 150 MG Oral Tablet Extended Release 24 Hour  (Wellbutrin XL); TAKE 1 TABLET DAILY for 14 days then oncrease to 300mg (separate rx)   Rx By: VOLODYMYR ROD; Dispense: 14 Days ; #:14 Tablet Extended Release 24 Hour; Refill: 0; MIRZA = N; Verified  Transmission to [facility]; Last Updated By: System, [facility]; 11/16/2017 3:19:12 PM   · Divalproex Sodium  MG Oral Tablet Extended Release 24 Hour  (Depakote ER); TAKE 2 TABLETS AT BEDTIME   Rx By: VOLODYMYR ROD; Dispense: 60 Days ; #:60 Tablet Extended Release 24 Hour; Refill: 1; MIRZA = N; Verified Transmission to [facility]; Last Updated By: System, [facility]; 11/16/2017 3:19:13 PM    Signatures   Electronically signed by : VOLODYMYR ROD MD; Nov 16 2017  4:03PM CST    Electronically signed by : VOLODYMYR RUIZ MD; Nov 16 2017  4:08PM CST

## 2022-05-04 NOTE — PLAN OF CARE
Focus: home meds and admission information  Data: this RN spoke with pt's daughter Sada Vasques. Family requested to be called back at 0900. Action: will endorse to next RN.   Response:

## 2022-05-04 NOTE — CM/SW NOTE
Department  notified of request for [facility], aidin referrals started. Assigned CM/SW to follow up with pt/family on further discharge planning.      Brandon Blanchard

## 2022-05-04 NOTE — PLAN OF CARE
Problem: Patient Centered Care  Goal: Patient preferences are identified and integrated in the patient's plan of care  Description: Interventions:  - What would you like us to know as we care for you? Poor historian due to dementia  - Provide timely, complete, and accurate information to patient/family  - Incorporate patient and family knowledge, values, beliefs, and cultural backgrounds into the planning and delivery of care  - Encourage patient/family to participate in care and decision-making at the level they choose  - Honor patient and family perspectives and choices  Outcome: Progressing     Problem: Patient/Family Goals  Goal: Patient/Family Long Term Goal  Description: Patient's Long Term Goal:  poor historian unable to provide info    Interventions:  - follow  md orders, monitor vital signs and labs  - See additional Care Plan goals for specific interventions  Outcome: Progressing     Problem: Patient/Family Goals  Goal: Patient/Family Short Term Goal  Description: Patient's Short Term Goal: patient is poor historian due to dementia    Interventions:   - follow  MD orders, administer medication, monitor labs and testing.  - See additional Care Plan goals for specific interventions  Outcome: Progressing    Patient has history of dementia, unable to provide additional information. Will try reaching family this AM for more information.  Vital signs stable, patient denies pain, remains comfortable and resting in bed

## 2022-05-05 ENCOUNTER — APPOINTMENT (OUTPATIENT)
Dept: CT IMAGING | Facility: HOSPITAL | Age: 77
End: 2022-05-05
Attending: PHYSICIAN ASSISTANT
Payer: MEDICAID

## 2022-05-05 LAB
ANION GAP SERPL CALC-SCNC: 6 MMOL/L (ref 0–18)
BASOPHILS # BLD AUTO: 0.03 X10(3) UL (ref 0–0.2)
BASOPHILS NFR BLD AUTO: 0.3 %
BUN BLD-MCNC: 23 MG/DL (ref 7–18)
BUN/CREAT SERPL: 18.5 (ref 10–20)
CALCIUM BLD-MCNC: 7.8 MG/DL (ref 8.5–10.1)
CHLORIDE SERPL-SCNC: 110 MMOL/L (ref 98–112)
CO2 SERPL-SCNC: 25 MMOL/L (ref 21–32)
CREAT BLD-MCNC: 1.24 MG/DL
DEPRECATED RDW RBC AUTO: 47.7 FL (ref 35.1–46.3)
EOSINOPHIL # BLD AUTO: 0.01 X10(3) UL (ref 0–0.7)
EOSINOPHIL NFR BLD AUTO: 0.1 %
ERYTHROCYTE [DISTWIDTH] IN BLOOD BY AUTOMATED COUNT: 14.8 % (ref 11–15)
GLUCOSE BLD-MCNC: 104 MG/DL (ref 70–99)
HCT VFR BLD AUTO: 37.2 %
HGB BLD-MCNC: 12 G/DL
IMM GRANULOCYTES # BLD AUTO: 0.08 X10(3) UL (ref 0–1)
IMM GRANULOCYTES NFR BLD: 0.7 %
LYMPHOCYTES # BLD AUTO: 1.19 X10(3) UL (ref 1–4)
LYMPHOCYTES NFR BLD AUTO: 10.8 %
MCH RBC QN AUTO: 28.1 PG (ref 26–34)
MCHC RBC AUTO-ENTMCNC: 32.3 G/DL (ref 31–37)
MCV RBC AUTO: 87.1 FL
MONOCYTES # BLD AUTO: 0.86 X10(3) UL (ref 0.1–1)
MONOCYTES NFR BLD AUTO: 7.8 %
NEUTROPHILS # BLD AUTO: 8.87 X10 (3) UL (ref 1.5–7.7)
NEUTROPHILS # BLD AUTO: 8.87 X10(3) UL (ref 1.5–7.7)
NEUTROPHILS NFR BLD AUTO: 80.3 %
OSMOLALITY SERPL CALC.SUM OF ELEC: 296 MOSM/KG (ref 275–295)
PLATELET # BLD AUTO: 184 10(3)UL (ref 150–450)
POTASSIUM SERPL-SCNC: 3.6 MMOL/L (ref 3.5–5.1)
POTASSIUM SERPL-SCNC: 3.6 MMOL/L (ref 3.5–5.1)
RBC # BLD AUTO: 4.27 X10(6)UL
SODIUM SERPL-SCNC: 141 MMOL/L (ref 136–145)
WBC # BLD AUTO: 11 X10(3) UL (ref 4–11)

## 2022-05-05 PROCEDURE — 74176 CT ABD & PELVIS W/O CONTRAST: CPT | Performed by: PHYSICIAN ASSISTANT

## 2022-05-05 PROCEDURE — 99233 SBSQ HOSP IP/OBS HIGH 50: CPT | Performed by: HOSPITALIST

## 2022-05-05 RX ORDER — DONEPEZIL HYDROCHLORIDE 10 MG/1
10 TABLET, FILM COATED ORAL NIGHTLY
COMMUNITY

## 2022-05-05 RX ORDER — TAMSULOSIN HYDROCHLORIDE 0.4 MG/1
0.4 CAPSULE ORAL DAILY
COMMUNITY

## 2022-05-05 RX ORDER — TAMSULOSIN HYDROCHLORIDE 0.4 MG/1
0.4 CAPSULE ORAL DAILY
Status: DISCONTINUED | OUTPATIENT
Start: 2022-05-05 | End: 2022-05-09

## 2022-05-05 RX ORDER — DONEPEZIL HYDROCHLORIDE 10 MG/1
10 TABLET, FILM COATED ORAL NIGHTLY
Status: DISCONTINUED | OUTPATIENT
Start: 2022-05-05 | End: 2022-05-09

## 2022-05-05 NOTE — PLAN OF CARE
Patient had fever of 102.7 during night, 500cc bolus given, Tylenol given, last temp: 98.3, IV fluids at 100ml/hr, patient confused but cooperative, taking po fluids well; eddy intact. Problem: Patient Centered Care  Goal: Patient preferences are identified and integrated in the patient's plan of care  Description: Interventions:  - What would you like us to know as we care for you? Poor historian due to dementia  - Provide timely, complete, and accurate information to patient/family  - Incorporate patient and family knowledge, values, beliefs, and cultural backgrounds into the planning and delivery of care  - Encourage patient/family to participate in care and decision-making at the level they choose  - Honor patient and family perspectives and choices  Outcome: Not Progressing     Problem: Patient/Family Goals  Goal: Patient/Family Long Term Goal  Description: Patient's Long Term Goal: return home with 24 care or long term care placement    Interventions:  - Social work for discharge planning  -follow plan of care  - See additional Care Plan goals for specific interventions  Outcome: Not Progressing  Goal: Patient/Family Short Term Goal  Description: Patient's Short Term Goal: fever resolved    Interventions:   - IV antibiotics as ordered  -follow plan of care  -tylenol as ordered  -IV fluids as ordered   - See additional Care Plan goals for specific interventions  Outcome: Progressing     Problem: RISK FOR INFECTION - ADULT  Goal: Absence of fever/infection during anticipated neutropenic period  Description: INTERVENTIONS  - Monitor WBC  - monitor vital signs  -blood cultures, lactic acid of needed  --IV fluids  -Tylenol as ordered  -Antibiotics as ordered  Outcome: Progressing     Problem: SAFETY ADULT - FALL  Goal: Free from fall injury  Description: INTERVENTIONS:  - Assess pt frequently for physical needs  - Identify cognitive and physical deficits and behaviors that affect risk of falls.   - [place] fall precautions as indicated by assessment.  - Educate pt/family on patient safety including physical limitations  - Instruct pt to call for assistance with activity based on assessment  - Modify environment to reduce risk of injury  - Provide assistive devices as appropriate  - Consider OT/PT consult to assist with strengthening/mobility  - Encourage toileting schedule  Outcome: Progressing     Problem: PAIN - ADULT  Goal: Verbalizes/displays adequate comfort level or patient's stated pain goal  Description: INTERVENTIONS:  - Encourage pt to monitor pain and request assistance  - Assess pain using appropriate pain scale  - Administer analgesics based on type and severity of pain and evaluate response  - Implement non-pharmacological measures as appropriate and evaluate response  - Consider cultural and social influences on pain and pain management  - Manage/alleviate anxiety  - Utilize distraction and/or relaxation techniques  - Monitor for opioid side effects  - Notify MD/LIP if interventions unsuccessful or patient reports new pain  - Anticipate increased pain with activity and pre-medicate as appropriate  Outcome: Progressing     Problem: Altered Communication/Language Barrier  Goal: Patient/Family is able to understand and participate in their care  Description: Interventions:  - Assess communication ability and preferred communication style  - Implement communication aides and strategies  - Use visual cues when possible  - Listen attentively, be patient, do not interrupt  - Minimize distractions  - Allow time for understanding and response  - Establish method for patient to ask for assistance (call light)  - Provide an  as needed  - Communicate barriers and strategies to overcome with those who interact with patient  Outcome: Progressing     Problem: Impaired Cognition  Goal: Patient will exhibit improved attention, thought processing and/or memory  Description: Interventions:  Assess neuro status/cognition q shift and prn  -Re-orient patient to surroundings, situation, plan of care  Outcome: Not Progressing     Problem: Impaired Communication  Goal: Patient will achieve maximal communication potential  Description: Interventions:  -Use language line for interpretation  -Rely on family as interpreters if needed  Outcome: Progressing

## 2022-05-05 NOTE — PLAN OF CARE
Patient's daughter(Renay) called re/need for patient's home meds--her mailbox was full, unable to leave message. Patient's son(Scott) called, message left to call this nurse back with list of patient's home meds.

## 2022-05-06 LAB
ANION GAP SERPL CALC-SCNC: 5 MMOL/L (ref 0–18)
BASOPHILS # BLD AUTO: 0.03 X10(3) UL (ref 0–0.2)
BASOPHILS NFR BLD AUTO: 0.3 %
BUN BLD-MCNC: 17 MG/DL (ref 7–18)
BUN/CREAT SERPL: 17.7 (ref 10–20)
C DIFF TOX B STL QL: NEGATIVE
CALCIUM BLD-MCNC: 8 MG/DL (ref 8.5–10.1)
CHLORIDE SERPL-SCNC: 109 MMOL/L (ref 98–112)
CO2 SERPL-SCNC: 26 MMOL/L (ref 21–32)
CREAT BLD-MCNC: 0.96 MG/DL
DEPRECATED RDW RBC AUTO: 44.9 FL (ref 35.1–46.3)
EOSINOPHIL # BLD AUTO: 0.05 X10(3) UL (ref 0–0.7)
EOSINOPHIL NFR BLD AUTO: 0.5 %
ERYTHROCYTE [DISTWIDTH] IN BLOOD BY AUTOMATED COUNT: 14.6 % (ref 11–15)
GLUCOSE BLD-MCNC: 113 MG/DL (ref 70–99)
HCT VFR BLD AUTO: 36.1 %
HGB BLD-MCNC: 12.1 G/DL
IMM GRANULOCYTES # BLD AUTO: 0.11 X10(3) UL (ref 0–1)
IMM GRANULOCYTES NFR BLD: 1.2 %
LYMPHOCYTES # BLD AUTO: 0.99 X10(3) UL (ref 1–4)
LYMPHOCYTES NFR BLD AUTO: 10.8 %
MCH RBC QN AUTO: 28.3 PG (ref 26–34)
MCHC RBC AUTO-ENTMCNC: 33.5 G/DL (ref 31–37)
MCV RBC AUTO: 84.3 FL
MONOCYTES # BLD AUTO: 0.91 X10(3) UL (ref 0.1–1)
MONOCYTES NFR BLD AUTO: 9.9 %
NEUTROPHILS # BLD AUTO: 7.09 X10 (3) UL (ref 1.5–7.7)
NEUTROPHILS # BLD AUTO: 7.09 X10(3) UL (ref 1.5–7.7)
NEUTROPHILS NFR BLD AUTO: 77.3 %
OSMOLALITY SERPL CALC.SUM OF ELEC: 292 MOSM/KG (ref 275–295)
PLATELET # BLD AUTO: 176 10(3)UL (ref 150–450)
POTASSIUM SERPL-SCNC: 3.3 MMOL/L (ref 3.5–5.1)
RBC # BLD AUTO: 4.28 X10(6)UL
SODIUM SERPL-SCNC: 140 MMOL/L (ref 136–145)
WBC # BLD AUTO: 9.2 X10(3) UL (ref 4–11)

## 2022-05-06 PROCEDURE — 99233 SBSQ HOSP IP/OBS HIGH 50: CPT | Performed by: HOSPITALIST

## 2022-05-06 RX ORDER — POTASSIUM CHLORIDE 20 MEQ/1
40 TABLET, EXTENDED RELEASE ORAL ONCE
Status: COMPLETED | OUTPATIENT
Start: 2022-05-06 | End: 2022-05-06

## 2022-05-06 NOTE — PLAN OF CARE
Consent printed for pillcam    Spoke to patient's daughter Nohemi Pinzon and updated on plan of care. Given tylenol x1 for temp of 101.5 today, all other vital signs stable. Poor oral intake. Patient c/o discomfort when touching abdomen but noted patient to be grimacing when touching generally. currently drinking oral contrast for CT Abdomen, handoff given to oncoming RN.       Problem: Patient/Family Goals  Goal: Patient/Family Short Term Goal  Description: Patient's Short Term Goal: fever resolved    Interventions:   - IV antibiotics as ordered  -follow plan of care  -tylenol as ordered  -IV fluids as ordered   - See additional Care Plan goals for specific interventions  Outcome: Progressing     Problem: RISK FOR INFECTION - ADULT  Goal: Absence of fever/infection during anticipated neutropenic period  Description: INTERVENTIONS  - Monitor WBC  - monitor vital signs  -blood cultures, lactic acid of needed  --IV fluids  -Tylenol as ordered  -Antibiotics as ordered  Outcome: Progressing     Problem: Altered Communication/Language Barrier  Goal: Patient/Family is able to understand and participate in their care  Description: Interventions:  - Assess communication ability and preferred communication style  - Implement communication aides and strategies  - Use visual cues when possible  - Listen attentively, be patient, do not interrupt  - Minimize distractions  - Allow time for understanding and response  - Establish method for patient to ask for assistance (call light)  - Provide an  as needed  - Communicate barriers and strategies to overcome with those who interact with patient  Outcome: Progressing     Problem: Impaired Cognition  Goal: Patient will exhibit improved attention, thought processing and/or memory  Description: Interventions:  Assess neuro status/cognition q shift and prn  -Re-orient patient to surroundings, situation, plan of care  Outcome: Progressing

## 2022-05-06 NOTE — PLAN OF CARE
Problem: RISK FOR INFECTION - ADULT  Goal: Absence of fever/infection during anticipated neutropenic period  Description: INTERVENTIONS  - Monitor WBC  - monitor vital signs  -blood cultures, lactic acid of needed  --IV fluids  -Tylenol as ordered  -Antibiotics as ordered  Outcome: Progressing     Problem: SAFETY ADULT - FALL  Goal: Free from fall injury  Description: INTERVENTIONS:  - Assess pt frequently for physical needs  - Identify cognitive and physical deficits and behaviors that affect risk of falls.   - [place] fall precautions as indicated by assessment.  - Educate pt/family on patient safety including physical limitations  - Instruct pt to call for assistance with activity based on assessment  - Modify environment to reduce risk of injury  - Provide assistive devices as appropriate  - Consider OT/PT consult to assist with strengthening/mobility  - Encourage toileting schedule  Outcome: Progressing     Problem: PAIN - ADULT  Goal: Verbalizes/displays adequate comfort level or patient's stated pain goal  Description: INTERVENTIONS:  - Encourage pt to monitor pain and request assistance  - Assess pain using appropriate pain scale  - Administer analgesics based on type and severity of pain and evaluate response  - Implement non-pharmacological measures as appropriate and evaluate response  - Consider cultural and social influences on pain and pain management  - Manage/alleviate anxiety  - Utilize distraction and/or relaxation techniques  - Monitor for opioid side effects  - Notify MD/LIP if interventions unsuccessful or patient reports new pain  - Anticipate increased pain with activity and pre-medicate as appropriate  Outcome: Progressing

## 2022-05-07 LAB
ANION GAP SERPL CALC-SCNC: 6 MMOL/L (ref 0–18)
BUN BLD-MCNC: 17 MG/DL (ref 7–18)
BUN/CREAT SERPL: 20 (ref 10–20)
CALCIUM BLD-MCNC: 8.2 MG/DL (ref 8.5–10.1)
CHLORIDE SERPL-SCNC: 108 MMOL/L (ref 98–112)
CO2 SERPL-SCNC: 26 MMOL/L (ref 21–32)
CREAT BLD-MCNC: 0.85 MG/DL
DEPRECATED RDW RBC AUTO: 43.9 FL (ref 35.1–46.3)
E CLOAC COMP DNA BLD POS NAA+NON-PROBE: DETECTED
E COLI DNA BLD POS QL NAA+NON-PROBE: DETECTED
ERYTHROCYTE [DISTWIDTH] IN BLOOD BY AUTOMATED COUNT: 14.3 % (ref 11–15)
GLUCOSE BLD-MCNC: 120 MG/DL (ref 70–99)
HCT VFR BLD AUTO: 36.9 %
HGB BLD-MCNC: 12.1 G/DL
MCH RBC QN AUTO: 27.6 PG (ref 26–34)
MCHC RBC AUTO-ENTMCNC: 32.8 G/DL (ref 31–37)
MCV RBC AUTO: 84.1 FL
OSMOLALITY SERPL CALC.SUM OF ELEC: 293 MOSM/KG (ref 275–295)
PLATELET # BLD AUTO: 186 10(3)UL (ref 150–450)
POTASSIUM SERPL-SCNC: 3.5 MMOL/L (ref 3.5–5.1)
POTASSIUM SERPL-SCNC: 3.5 MMOL/L (ref 3.5–5.1)
RBC # BLD AUTO: 4.39 X10(6)UL
SODIUM SERPL-SCNC: 140 MMOL/L (ref 136–145)
WBC # BLD AUTO: 8.5 X10(3) UL (ref 4–11)

## 2022-05-07 PROCEDURE — 99233 SBSQ HOSP IP/OBS HIGH 50: CPT | Performed by: HOSPITALIST

## 2022-05-07 PROCEDURE — 99253 IP/OBS CNSLTJ NEW/EST LOW 45: CPT | Performed by: UROLOGY

## 2022-05-07 RX ORDER — CIPROFLOXACIN 500 MG/1
500 TABLET, FILM COATED ORAL
Status: DISCONTINUED | OUTPATIENT
Start: 2022-05-07 | End: 2022-05-09

## 2022-05-07 RX ORDER — CIPROFLOXACIN 500 MG/1
500 TABLET, FILM COATED ORAL 2 TIMES DAILY
Qty: 20 TABLET | Refills: 0 | Status: SHIPPED | OUTPATIENT
Start: 2022-05-07 | End: 2022-05-17

## 2022-05-07 NOTE — PLAN OF CARE
Vss. Intact improved today. Will continue to monitor. Afebrile today. Problem: Patient Centered Care  Goal: Patient preferences are identified and integrated in the patient's plan of care  Description: Interventions:  - What would you like us to know as we care for you? Poor historian due to dementia  - Provide timely, complete, and accurate information to patient/family  - Incorporate patient and family knowledge, values, beliefs, and cultural backgrounds into the planning and delivery of care  - Encourage patient/family to participate in care and decision-making at the level they choose  - Honor patient and family perspectives and choices  Outcome: Progressing     Problem: Patient/Family Goals  Goal: Patient/Family Long Term Goal  Description: Patient's Long Term Goal: return home with 24 care or long term care placement    Interventions:  - Social work for discharge planning  -follow plan of care  - See additional Care Plan goals for specific interventions  Outcome: Progressing  Goal: Patient/Family Short Term Goal  Description: Patient's Short Term Goal: fever resolved    Interventions:   - IV antibiotics as ordered  -follow plan of care  -tylenol as ordered  -IV fluids as ordered   - See additional Care Plan goals for specific interventions  Outcome: Progressing     Problem: RISK FOR INFECTION - ADULT  Goal: Absence of fever/infection during anticipated neutropenic period  Description: INTERVENTIONS  - Monitor WBC  - monitor vital signs  -blood cultures, lactic acid of needed  --IV fluids  -Tylenol as ordered  -Antibiotics as ordered  Outcome: Progressing     Problem: SAFETY ADULT - FALL  Goal: Free from fall injury  Description: INTERVENTIONS:  - Assess pt frequently for physical needs  - Identify cognitive and physical deficits and behaviors that affect risk of falls.   - [place] fall precautions as indicated by assessment.  - Educate pt/family on patient safety including physical limitations  - Instruct pt to call for assistance with activity based on assessment  - Modify environment to reduce risk of injury  - Provide assistive devices as appropriate  - Consider OT/PT consult to assist with strengthening/mobility  - Encourage toileting schedule  Outcome: Progressing     Problem: PAIN - ADULT  Goal: Verbalizes/displays adequate comfort level or patient's stated pain goal  Description: INTERVENTIONS:  - Encourage pt to monitor pain and request assistance  - Assess pain using appropriate pain scale  - Administer analgesics based on type and severity of pain and evaluate response  - Implement non-pharmacological measures as appropriate and evaluate response  - Consider cultural and social influences on pain and pain management  - Manage/alleviate anxiety  - Utilize distraction and/or relaxation techniques  - Monitor for opioid side effects  - Notify MD/LIP if interventions unsuccessful or patient reports new pain  - Anticipate increased pain with activity and pre-medicate as appropriate  Outcome: Progressing     Problem: Altered Communication/Language Barrier  Goal: Patient/Family is able to understand and participate in their care  Description: Interventions:  - Assess communication ability and preferred communication style  - Implement communication aides and strategies  - Use visual cues when possible  - Listen attentively, be patient, do not interrupt  - Minimize distractions  - Allow time for understanding and response  - Establish method for patient to ask for assistance (call light)  - Provide an  as needed  - Communicate barriers and strategies to overcome with those who interact with patient  Outcome: Progressing     Problem: Impaired Cognition  Goal: Patient will exhibit improved attention, thought processing and/or memory  Description: Interventions:  Assess neuro status/cognition q shift and prn  -Re-orient patient to surroundings, situation, plan of care  Outcome: Progressing Problem: Impaired Communication  Goal: Patient will achieve maximal communication potential  Description: Interventions:  -Use language line for interpretation  -Rely on family as interpreters if needed  Outcome: Progressing

## 2022-05-07 NOTE — PLAN OF CARE
Problem: Patient Centered Care  Goal: Patient preferences are identified and integrated in the patient's plan of care  Description: Interventions:  - What would you like us to know as we care for you? Poor historian due to dementia  - Provide timely, complete, and accurate information to patient/family  - Incorporate patient and family knowledge, values, beliefs, and cultural backgrounds into the planning and delivery of care  - Encourage patient/family to participate in care and decision-making at the level they choose  - Honor patient and family perspectives and choices  Outcome: Progressing     Problem: Patient/Family Goals  Goal: Patient/Family Long Term Goal  Description: Patient's Long Term Goal: return home with 24 care or long term care placement    Interventions:  - Social work for discharge planning  -follow plan of care  - See additional Care Plan goals for specific interventions  Outcome: Progressing  Goal: Patient/Family Short Term Goal  Description: Patient's Short Term Goal: fever resolved    Interventions:   - IV antibiotics as ordered  -follow plan of care  -tylenol as ordered  -IV fluids as ordered   - See additional Care Plan goals for specific interventions  Outcome: Progressing     Problem: RISK FOR INFECTION - ADULT  Goal: Absence of fever/infection during anticipated neutropenic period  Description: INTERVENTIONS  - Monitor WBC  - monitor vital signs  -blood cultures, lactic acid of needed  --IV fluids  -Tylenol as ordered  -Antibiotics as ordered  Outcome: Progressing     Problem: SAFETY ADULT - FALL  Goal: Free from fall injury  Description: INTERVENTIONS:  - Assess pt frequently for physical needs  - Identify cognitive and physical deficits and behaviors that affect risk of falls.   - [place] fall precautions as indicated by assessment.  - Educate pt/family on patient safety including physical limitations  - Instruct pt to call for assistance with activity based on assessment  - Modify environment to reduce risk of injury  - Provide assistive devices as appropriate  - Consider OT/PT consult to assist with strengthening/mobility  - Encourage toileting schedule  Outcome: Progressing     Problem: PAIN - ADULT  Goal: Verbalizes/displays adequate comfort level or patient's stated pain goal  Description: INTERVENTIONS:  - Encourage pt to monitor pain and request assistance  - Assess pain using appropriate pain scale  - Administer analgesics based on type and severity of pain and evaluate response  - Implement non-pharmacological measures as appropriate and evaluate response  - Consider cultural and social influences on pain and pain management  - Manage/alleviate anxiety  - Utilize distraction and/or relaxation techniques  - Monitor for opioid side effects  - Notify MD/LIP if interventions unsuccessful or patient reports new pain  - Anticipate increased pain with activity and pre-medicate as appropriate  Outcome: Progressing     Problem: Altered Communication/Language Barrier  Goal: Patient/Family is able to understand and participate in their care  Description: Interventions:  - Assess communication ability and preferred communication style  - Implement communication aides and strategies  - Use visual cues when possible  - Listen attentively, be patient, do not interrupt  - Minimize distractions  - Allow time for understanding and response  - Establish method for patient to ask for assistance (call light)  - Provide an  as needed  - Communicate barriers and strategies to overcome with those who interact with patient  Outcome: Progressing     Problem: Impaired Cognition  Goal: Patient will exhibit improved attention, thought processing and/or memory  Description: Interventions:  Assess neuro status/cognition q shift and prn  -Re-orient patient to surroundings, situation, plan of care  Outcome: Progressing     Problem: Impaired Communication  Goal: Patient will achieve maximal communication potential  Description: Interventions:  -Use language line for interpretation  -Rely on family as interpreters if needed  Outcome: Progressing

## 2022-05-07 NOTE — PLAN OF CARE
Problem: Patient Centered Care  Goal: Patient preferences are identified and integrated in the patient's plan of care  Description: Interventions:  - What would you like us to know as we care for you? Poor historian due to dementia  - Provide timely, complete, and accurate information to patient/family  - Incorporate patient and family knowledge, values, beliefs, and cultural backgrounds into the planning and delivery of care  - Encourage patient/family to participate in care and decision-making at the level they choose  - Honor patient and family perspectives and choices  Outcome: Progressing     Problem: RISK FOR INFECTION - ADULT  Goal: Absence of fever/infection during anticipated neutropenic period  Description: INTERVENTIONS  - Monitor WBC  - monitor vital signs  -blood cultures, lactic acid of needed  --IV fluids  -Tylenol as ordered  -Antibiotics as ordered  Outcome: Progressing     Problem: SAFETY ADULT - FALL  Goal: Free from fall injury  Description: INTERVENTIONS:  - Assess pt frequently for physical needs  - Identify cognitive and physical deficits and behaviors that affect risk of falls.   - [place] fall precautions as indicated by assessment.  - Educate pt/family on patient safety including physical limitations  - Instruct pt to call for assistance with activity based on assessment  - Modify environment to reduce risk of injury  - Provide assistive devices as appropriate  - Consider OT/PT consult to assist with strengthening/mobility  - Encourage toileting schedule  Outcome: Progressing     Problem: PAIN - ADULT  Goal: Verbalizes/displays adequate comfort level or patient's stated pain goal  Description: INTERVENTIONS:  - Encourage pt to monitor pain and request assistance  - Assess pain using appropriate pain scale  - Administer analgesics based on type and severity of pain and evaluate response  - Implement non-pharmacological measures as appropriate and evaluate response  - Consider cultural and social influences on pain and pain management  - Manage/alleviate anxiety  - Utilize distraction and/or relaxation techniques  - Monitor for opioid side effects  - Notify MD/LIP if interventions unsuccessful or patient reports new pain  - Anticipate increased pain with activity and pre-medicate as appropriate  Outcome: Progressing     Problem: Altered Communication/Language Barrier  Goal: Patient/Family is able to understand and participate in their care  Description: Interventions:  - Assess communication ability and preferred communication style  - Implement communication aides and strategies  - Use visual cues when possible  - Listen attentively, be patient, do not interrupt  - Minimize distractions  - Allow time for understanding and response  - Establish method for patient to ask for assistance (call light)  - Provide an  as needed  - Communicate barriers and strategies to overcome with those who interact with patient  Outcome: Progressing     Problem: Impaired Cognition  Goal: Patient will exhibit improved attention, thought processing and/or memory  Description: Interventions:  Assess neuro status/cognition q shift and prn  -Re-orient patient to surroundings, situation, plan of care  Outcome: Progressing     Problem: Impaired Communication  Goal: Patient will achieve maximal communication potential  Description: Interventions:  -Use language line for interpretation  -Rely on family as interpreters if needed  Outcome: Progressing     Patient A/O1-2 speaks jose francisco, language barrier, resting in bed, no sign of pain. Q2 turns. Eddy care. IV antibiotic switched to PO. Eddy care. Urology consult. Possible remove eddy tomorrow. Monitor output. Pain manage as needed. Fall precaution. Encourage to eat. Sleeping aid requested by family for tonight. Call light within reach.

## 2022-05-08 ENCOUNTER — APPOINTMENT (OUTPATIENT)
Dept: ULTRASOUND IMAGING | Facility: HOSPITAL | Age: 77
End: 2022-05-08
Attending: UROLOGY
Payer: MEDICAID

## 2022-05-08 LAB
ANION GAP SERPL CALC-SCNC: 5 MMOL/L (ref 0–18)
BUN BLD-MCNC: 14 MG/DL (ref 7–18)
BUN/CREAT SERPL: 19.7 (ref 10–20)
CALCIUM BLD-MCNC: 8.1 MG/DL (ref 8.5–10.1)
CHLORIDE SERPL-SCNC: 111 MMOL/L (ref 98–112)
CO2 SERPL-SCNC: 26 MMOL/L (ref 21–32)
CREAT BLD-MCNC: 0.71 MG/DL
DEPRECATED RDW RBC AUTO: 45.6 FL (ref 35.1–46.3)
ERYTHROCYTE [DISTWIDTH] IN BLOOD BY AUTOMATED COUNT: 14.6 % (ref 11–15)
GLUCOSE BLD-MCNC: 102 MG/DL (ref 70–99)
HCT VFR BLD AUTO: 36 %
HGB BLD-MCNC: 11.9 G/DL
MCH RBC QN AUTO: 28.1 PG (ref 26–34)
MCHC RBC AUTO-ENTMCNC: 33.1 G/DL (ref 31–37)
MCV RBC AUTO: 85.1 FL
OSMOLALITY SERPL CALC.SUM OF ELEC: 295 MOSM/KG (ref 275–295)
PLATELET # BLD AUTO: 183 10(3)UL (ref 150–450)
POTASSIUM SERPL-SCNC: 3.4 MMOL/L (ref 3.5–5.1)
RBC # BLD AUTO: 4.23 X10(6)UL
SODIUM SERPL-SCNC: 142 MMOL/L (ref 136–145)
WBC # BLD AUTO: 8.1 X10(3) UL (ref 4–11)

## 2022-05-08 PROCEDURE — 99233 SBSQ HOSP IP/OBS HIGH 50: CPT | Performed by: HOSPITALIST

## 2022-05-08 PROCEDURE — 76775 US EXAM ABDO BACK WALL LIM: CPT | Performed by: UROLOGY

## 2022-05-08 NOTE — PLAN OF CARE
Problem: Patient Centered Care  Goal: Patient preferences are identified and integrated in the patient's plan of care  Description: Interventions:  - What would you like us to know as we care for you? Poor historian due to dementia  - Provide timely, complete, and accurate information to patient/family  - Incorporate patient and family knowledge, values, beliefs, and cultural backgrounds into the planning and delivery of care  - Encourage patient/family to participate in care and decision-making at the level they choose  - Honor patient and family perspectives and choices  Outcome: Progressing     Problem: RISK FOR INFECTION - ADULT  Goal: Absence of fever/infection during anticipated neutropenic period  Description: INTERVENTIONS  - Monitor WBC  - monitor vital signs  -blood cultures, lactic acid of needed  --IV fluids  -Tylenol as ordered  -Antibiotics as ordered  Outcome: Progressing     Problem: SAFETY ADULT - FALL  Goal: Free from fall injury  Description: INTERVENTIONS:  - Assess pt frequently for physical needs  - Identify cognitive and physical deficits and behaviors that affect risk of falls.   - [place] fall precautions as indicated by assessment.  - Educate pt/family on patient safety including physical limitations  - Instruct pt to call for assistance with activity based on assessment  - Modify environment to reduce risk of injury  - Provide assistive devices as appropriate  - Consider OT/PT consult to assist with strengthening/mobility  - Encourage toileting schedule  Outcome: Progressing     Problem: PAIN - ADULT  Goal: Verbalizes/displays adequate comfort level or patient's stated pain goal  Description: INTERVENTIONS:  - Encourage pt to monitor pain and request assistance  - Assess pain using appropriate pain scale  - Administer analgesics based on type and severity of pain and evaluate response  - Implement non-pharmacological measures as appropriate and evaluate response  - Consider cultural and social influences on pain and pain management  - Manage/alleviate anxiety  - Utilize distraction and/or relaxation techniques  - Monitor for opioid side effects  - Notify MD/LIP if interventions unsuccessful or patient reports new pain  - Anticipate increased pain with activity and pre-medicate as appropriate  Outcome: Progressing     Problem: Altered Communication/Language Barrier  Goal: Patient/Family is able to understand and participate in their care  Description: Interventions:  - Assess communication ability and preferred communication style  - Implement communication aides and strategies  - Use visual cues when possible  - Listen attentively, be patient, do not interrupt  - Minimize distractions  - Allow time for understanding and response  - Establish method for patient to ask for assistance (call light)  - Provide an  as needed  - Communicate barriers and strategies to overcome with those who interact with patient  Outcome: Progressing     Problem: Impaired Cognition  Goal: Patient will exhibit improved attention, thought processing and/or memory  Description: Interventions:  Assess neuro status/cognition q shift and prn  -Re-orient patient to surroundings, situation, plan of care  Outcome: Progressing     Problem: Impaired Communication  Goal: Patient will achieve maximal communication potential  Description: Interventions:  -Use language line for interpretation  -Rely on family as interpreters if needed  Outcome: Progressing     Patient A/Ox1-2, resting in bed, no sign of discomfort. Q2 turns, skin care. [place]jo care/ removal. Monitor output. Bladder scan post void notify MD >150ml. Scheduled for US kidney. Continue IV fluids. Alejo removed 10:45am. Encourage patient to eat, feeder. Fall precaution. Call light within reach.

## 2022-05-08 NOTE — PLAN OF CARE
Problem: Patient Centered Care  Goal: Patient preferences are identified and integrated in the patient's plan of care  Description: Interventions:  - What would you like us to know as we care for you? Poor historian due to dementia  - Provide timely, complete, and accurate information to patient/family  - Incorporate patient and family knowledge, values, beliefs, and cultural backgrounds into the planning and delivery of care  - Encourage patient/family to participate in care and decision-making at the level they choose  - Honor patient and family perspectives and choices  Outcome: Progressing     Problem: Patient/Family Goals  Goal: Patient/Family Long Term Goal  Description: Patient's Long Term Goal: return home with 24 care or long term care placement    Interventions:  - Social work for discharge planning  -follow plan of care  - See additional Care Plan goals for specific interventions  Outcome: Progressing  Goal: Patient/Family Short Term Goal  Description: Patient's Short Term Goal: fever resolved    Interventions:   - IV antibiotics as ordered  -follow plan of care  -tylenol as ordered  -IV fluids as ordered   - See additional Care Plan goals for specific interventions  Outcome: Progressing     Problem: RISK FOR INFECTION - ADULT  Goal: Absence of fever/infection during anticipated neutropenic period  Description: INTERVENTIONS  - Monitor WBC  - monitor vital signs  -blood cultures, lactic acid of needed  --IV fluids  -Tylenol as ordered  -Antibiotics as ordered  Outcome: Progressing     Problem: SAFETY ADULT - FALL  Goal: Free from fall injury  Description: INTERVENTIONS:  - Assess pt frequently for physical needs  - Identify cognitive and physical deficits and behaviors that affect risk of falls.   - [place] fall precautions as indicated by assessment.  - Educate pt/family on patient safety including physical limitations  - Instruct pt to call for assistance with activity based on assessment  - Modify environment to reduce risk of injury  - Provide assistive devices as appropriate  - Consider OT/PT consult to assist with strengthening/mobility  - Encourage toileting schedule  Outcome: Progressing     Problem: PAIN - ADULT  Goal: Verbalizes/displays adequate comfort level or patient's stated pain goal  Description: INTERVENTIONS:  - Encourage pt to monitor pain and request assistance  - Assess pain using appropriate pain scale  - Administer analgesics based on type and severity of pain and evaluate response  - Implement non-pharmacological measures as appropriate and evaluate response  - Consider cultural and social influences on pain and pain management  - Manage/alleviate anxiety  - Utilize distraction and/or relaxation techniques  - Monitor for opioid side effects  - Notify MD/LIP if interventions unsuccessful or patient reports new pain  - Anticipate increased pain with activity and pre-medicate as appropriate  Outcome: Progressing     Problem: Altered Communication/Language Barrier  Goal: Patient/Family is able to understand and participate in their care  Description: Interventions:  - Assess communication ability and preferred communication style  - Implement communication aides and strategies  - Use visual cues when possible  - Listen attentively, be patient, do not interrupt  - Minimize distractions  - Allow time for understanding and response  - Establish method for patient to ask for assistance (call light)  - Provide an  as needed  - Communicate barriers and strategies to overcome with those who interact with patient  Outcome: Progressing     Problem: Impaired Cognition  Goal: Patient will exhibit improved attention, thought processing and/or memory  Description: Interventions:  Assess neuro status/cognition q shift and prn  -Re-orient patient to surroundings, situation, plan of care  Outcome: Progressing     Problem: Impaired Communication  Goal: Patient will achieve maximal communication potential  Description: Interventions:  -Use language line for interpretation  -Rely on family as interpreters if needed  Outcome: Progressing  No acute changes overnight. Vitals stable. Sleeping aid provided. Safety measures in place. Frequent rounding by nursing staff.

## 2022-05-09 ENCOUNTER — TELEPHONE (OUTPATIENT)
Dept: FAMILY MEDICINE CLINIC | Facility: CLINIC | Age: 77
End: 2022-05-09

## 2022-05-09 ENCOUNTER — TELEPHONE (OUTPATIENT)
Dept: SURGERY | Facility: CLINIC | Age: 77
End: 2022-05-09

## 2022-05-09 VITALS
TEMPERATURE: 98 F | DIASTOLIC BLOOD PRESSURE: 73 MMHG | RESPIRATION RATE: 18 BRPM | OXYGEN SATURATION: 99 % | BODY MASS INDEX: 29.16 KG/M2 | WEIGHT: 175 LBS | SYSTOLIC BLOOD PRESSURE: 148 MMHG | HEIGHT: 65 IN | HEART RATE: 75 BPM

## 2022-05-09 LAB
ANION GAP SERPL CALC-SCNC: 5 MMOL/L (ref 0–18)
BUN BLD-MCNC: 14 MG/DL (ref 7–18)
BUN/CREAT SERPL: 18.7 (ref 10–20)
CALCIUM BLD-MCNC: 8.1 MG/DL (ref 8.5–10.1)
CHLORIDE SERPL-SCNC: 110 MMOL/L (ref 98–112)
CO2 SERPL-SCNC: 26 MMOL/L (ref 21–32)
CREAT BLD-MCNC: 0.75 MG/DL
DEPRECATED RDW RBC AUTO: 46 FL (ref 35.1–46.3)
ERYTHROCYTE [DISTWIDTH] IN BLOOD BY AUTOMATED COUNT: 14.6 % (ref 11–15)
GLUCOSE BLD-MCNC: 103 MG/DL (ref 70–99)
HCT VFR BLD AUTO: 34.2 %
HGB BLD-MCNC: 11.3 G/DL
MCH RBC QN AUTO: 28 PG (ref 26–34)
MCHC RBC AUTO-ENTMCNC: 33 G/DL (ref 31–37)
MCV RBC AUTO: 84.9 FL
OSMOLALITY SERPL CALC.SUM OF ELEC: 293 MOSM/KG (ref 275–295)
PLATELET # BLD AUTO: 215 10(3)UL (ref 150–450)
POTASSIUM SERPL-SCNC: 3.3 MMOL/L (ref 3.5–5.1)
RBC # BLD AUTO: 4.03 X10(6)UL
SODIUM SERPL-SCNC: 141 MMOL/L (ref 136–145)
WBC # BLD AUTO: 9 X10(3) UL (ref 4–11)

## 2022-05-09 RX ORDER — CHOLECALCIFEROL (VITAMIN D3) 125 MCG
5 CAPSULE ORAL NIGHTLY
Status: SHIPPED | COMMUNITY
Start: 2022-05-09

## 2022-05-09 RX ORDER — LISINOPRIL 5 MG/1
2.5 TABLET ORAL DAILY
Status: DISCONTINUED | OUTPATIENT
Start: 2022-05-09 | End: 2022-05-09

## 2022-05-09 NOTE — PLAN OF CARE
Problem: Patient Centered Care  Goal: Patient preferences are identified and integrated in the patient's plan of care  Description: Interventions:  - What would you like us to know as we care for you? Poor historian due to dementia  - Provide timely, complete, and accurate information to patient/family  - Incorporate patient and family knowledge, values, beliefs, and cultural backgrounds into the planning and delivery of care  - Encourage patient/family to participate in care and decision-making at the level they choose  - Honor patient and family perspectives and choices  Outcome: Progressing     Problem: Patient/Family Goals  Goal: Patient/Family Long Term Goal  Description: Patient's Long Term Goal: return home with 24 care or long term care placement    Interventions:  - Social work for discharge planning  -follow plan of care  - See additional Care Plan goals for specific interventions  Outcome: Progressing  Goal: Patient/Family Short Term Goal  Description: Patient's Short Term Goal: fever resolved    Interventions:   - IV antibiotics as ordered  -follow plan of care  -tylenol as ordered  -IV fluids as ordered   - See additional Care Plan goals for specific interventions  Outcome: Progressing     Problem: RISK FOR INFECTION - ADULT  Goal: Absence of fever/infection during anticipated neutropenic period  Description: INTERVENTIONS  - Monitor WBC  - monitor vital signs  -blood cultures, lactic acid of needed  --IV fluids  -Tylenol as ordered  -Antibiotics as ordered  Outcome: Progressing     Problem: SAFETY ADULT - FALL  Goal: Free from fall injury  Description: INTERVENTIONS:  - Assess pt frequently for physical needs  - Identify cognitive and physical deficits and behaviors that affect risk of falls.   - [place] fall precautions as indicated by assessment.  - Educate pt/family on patient safety including physical limitations  - Instruct pt to call for assistance with activity based on assessment  - Modify environment to reduce risk of injury  - Provide assistive devices as appropriate  - Consider OT/PT consult to assist with strengthening/mobility  - Encourage toileting schedule  Outcome: Progressing     Problem: PAIN - ADULT  Goal: Verbalizes/displays adequate comfort level or patient's stated pain goal  Description: INTERVENTIONS:  - Encourage pt to monitor pain and request assistance  - Assess pain using appropriate pain scale  - Administer analgesics based on type and severity of pain and evaluate response  - Implement non-pharmacological measures as appropriate and evaluate response  - Consider cultural and social influences on pain and pain management  - Manage/alleviate anxiety  - Utilize distraction and/or relaxation techniques  - Monitor for opioid side effects  - Notify MD/LIP if interventions unsuccessful or patient reports new pain  - Anticipate increased pain with activity and pre-medicate as appropriate  Outcome: Progressing     Problem: Altered Communication/Language Barrier  Goal: Patient/Family is able to understand and participate in their care  Description: Interventions:  - Assess communication ability and preferred communication style  - Implement communication aides and strategies  - Use visual cues when possible  - Listen attentively, be patient, do not interrupt  - Minimize distractions  - Allow time for understanding and response  - Establish method for patient to ask for assistance (call light)  - Provide an  as needed  - Communicate barriers and strategies to overcome with those who interact with patient  Outcome: Progressing     Problem: Impaired Cognition  Goal: Patient will exhibit improved attention, thought processing and/or memory  Description: Interventions:  Assess neuro status/cognition q shift and prn  -Re-orient patient to surroundings, situation, plan of care  Outcome: Progressing     Problem: Impaired Communication  Goal: Patient will achieve maximal communication potential  Description: Interventions:  -Use language line for interpretation  -Rely on family as interpreters if needed  Outcome: Progressing  Patient alert and oriented x 2. Bladder scan showed 714 mL. MD notified. Alejo placed. VSS. Bed alarm on and safety precautions in place. Frequent rounding.

## 2022-05-09 NOTE — TELEPHONE ENCOUNTER
Please facilitate followup office visit for this pt with daughter next week for kidney stones and urinary retention. Daughter is her primry care taker as he has dementia.   Thanks

## 2022-05-09 NOTE — TELEPHONE ENCOUNTER
Spoke with daughter and scheduled appt for next week.      Future Appointments   Date Time Provider Figueroa Merritt   5/17/2022  1:50 PM Sana Ruffin MD [facility]

## 2022-05-09 NOTE — TELEPHONE ENCOUNTER
Spoke with Riya Gilmore RN  [facility]  (  verified ) informed of 's instructions below     Patient may be discharged to home later today

## 2022-05-09 NOTE — TELEPHONE ENCOUNTER
Patient currently hospitalized, will likely be discharged today with home health orders. Caller calling to confirm that Dr. Mejia  is willing to sign off on [address] orders as needed upon patient's return home. Confidential voicemail, verbal \"ok\" acceptable. Routing to provider to advise.

## 2022-05-09 NOTE — HOME CARE LIAISON
Discussed with patient's dtr how there is one accepting [facility] provider from [facility], dtr agreeable to [facility]. Agency reserved in [facility] and contact information placed on AVS.   Financial interest disclosure provided to patient. Desiree Das updated.

## 2022-05-09 NOTE — PROGRESS NOTES
Pt has discharge order in place. This nurse was notified that daughter would be picking pt up at 4pm for transport home. Daughter did not come to  pt, this nurse contacted her. She stated that she was not able to come pick pt up, and that he needed transport set up. This nurse let her know that she would have to come in for eddy teaching prior to discharge. I told her I would call her back after speaking with social work. Attempted to call daughter back after speaking with social work with no answer. Unable to leave message due to full mailbox. Called son, and spoke with wife, also stated would not be able to  patient. Asked for them to get in touch with pt's daughter, and call this nurse back. UPDATE: 1850:  Pt's grandson came to pick pt up. Eddy care educated provided, all questions and concerns addressed. IV access and tele discontinued. Discharge paperwork and prescriptions renewed as well. MD notified of DC.

## 2022-05-09 NOTE — PLAN OF CARE
Problem: Patient Centered Care  Goal: Patient preferences are identified and integrated in the patient's plan of care  Description: Interventions:  - What would you like us to know as we care for you? Poor historian due to dementia  - Provide timely, complete, and accurate information to patient/family  - Incorporate patient and family knowledge, values, beliefs, and cultural backgrounds into the planning and delivery of care  - Encourage patient/family to participate in care and decision-making at the level they choose  - Honor patient and family perspectives and choices  Outcome: Progressing     Problem: Patient/Family Goals  Goal: Patient/Family Long Term Goal  Description: Patient's Long Term Goal: return home with 24 care or long term care placement    Interventions:  - Social work for discharge planning  -follow plan of care  - See additional Care Plan goals for specific interventions  Outcome: Progressing  Goal: Patient/Family Short Term Goal  Description: Patient's Short Term Goal: fever resolved    Interventions:   - IV antibiotics as ordered  -follow plan of care  -tylenol as ordered  -IV fluids as ordered   - See additional Care Plan goals for specific interventions  Outcome: Progressing     Problem: RISK FOR INFECTION - ADULT  Goal: Absence of fever/infection during anticipated neutropenic period  Description: INTERVENTIONS  - Monitor WBC  - monitor vital signs  -blood cultures, lactic acid of needed  --IV fluids  -Tylenol as ordered  -Antibiotics as ordered  Outcome: Progressing     Problem: SAFETY ADULT - FALL  Goal: Free from fall injury  Description: INTERVENTIONS:  - Assess pt frequently for physical needs  - Identify cognitive and physical deficits and behaviors that affect risk of falls.   - [place] fall precautions as indicated by assessment.  - Educate pt/family on patient safety including physical limitations  - Instruct pt to call for assistance with activity based on assessment  - Modify environment to reduce risk of injury  - Provide assistive devices as appropriate  - Consider OT/PT consult to assist with strengthening/mobility  - Encourage toileting schedule  Outcome: Progressing     Problem: PAIN - ADULT  Goal: Verbalizes/displays adequate comfort level or patient's stated pain goal  Description: INTERVENTIONS:  - Encourage pt to monitor pain and request assistance  - Assess pain using appropriate pain scale  - Administer analgesics based on type and severity of pain and evaluate response  - Implement non-pharmacological measures as appropriate and evaluate response  - Consider cultural and social influences on pain and pain management  - Manage/alleviate anxiety  - Utilize distraction and/or relaxation techniques  - Monitor for opioid side effects  - Notify MD/LIP if interventions unsuccessful or patient reports new pain  - Anticipate increased pain with activity and pre-medicate as appropriate  Outcome: Progressing     Problem: Altered Communication/Language Barrier  Goal: Patient/Family is able to understand and participate in their care  Description: Interventions:  - Assess communication ability and preferred communication style  - Implement communication aides and strategies  - Use visual cues when possible  - Listen attentively, be patient, do not interrupt  - Minimize distractions  - Allow time for understanding and response  - Establish method for patient to ask for assistance (call light)  - Provide an  as needed  - Communicate barriers and strategies to overcome with those who interact with patient  Outcome: Progressing

## 2022-05-09 NOTE — CM/SW NOTE
DC order in place. [facility] willing to accept and will provide choice to dtr. ID: PO ciprofloxacin to complete a two week course (EOT 5/17/22). @4:45PM  RN notified SW that pt's daughter is requesting transport home. Address provided (confirmed by RN)  [address]    RN to provide verbal directions to daughter regarding eddy. [facility] to follow up in 24-48h. Ride placed on will call with [facility] ([phone]). RN to call to arrange. PCS DONE    PLAN: home with family, resume homemaker services (thru daughter), home health - [facility]     SW remains available for support and/or discharge planning. Please do not hesitate to call/chat SW if further DC needs arise.    Nguyen CUELLO, [facility]  Ext [phone]

## 2022-05-11 ENCOUNTER — PATIENT OUTREACH (OUTPATIENT)
Dept: CASE MANAGEMENT | Age: 77
End: 2022-05-11

## 2022-05-11 ENCOUNTER — TELEPHONE (OUTPATIENT)
Dept: FAMILY MEDICINE CLINIC | Facility: CLINIC | Age: 77
End: 2022-05-11

## 2022-05-11 PROCEDURE — 1111F DSCHRG MED/CURRENT MED MERGE: CPT

## 2022-05-11 NOTE — TELEPHONE ENCOUNTER
[facility] health called to inform dr Ada Ojeda that appointment for start of care is scheduled for 5/12 because the consent form was not completed.

## 2022-05-16 ENCOUNTER — OFFICE VISIT (OUTPATIENT)
Dept: FAMILY MEDICINE CLINIC | Facility: CLINIC | Age: 77
End: 2022-05-16
Payer: MEDICAID

## 2022-05-16 ENCOUNTER — LAB ENCOUNTER (OUTPATIENT)
Dept: LAB | Age: 77
End: 2022-05-16
Attending: FAMILY MEDICINE
Payer: MEDICAID

## 2022-05-16 VITALS
HEIGHT: 65 IN | SYSTOLIC BLOOD PRESSURE: 123 MMHG | TEMPERATURE: 97 F | DIASTOLIC BLOOD PRESSURE: 78 MMHG | HEART RATE: 96 BPM | WEIGHT: 171 LBS | BODY MASS INDEX: 28.49 KG/M2

## 2022-05-16 DIAGNOSIS — R65.20 SEVERE SEPSIS (HCC): ICD-10-CM

## 2022-05-16 DIAGNOSIS — N13.30 HYDROURETERONEPHROSIS: ICD-10-CM

## 2022-05-16 DIAGNOSIS — E11.8 CONTROLLED TYPE 2 DIABETES MELLITUS WITH COMPLICATION, WITHOUT LONG-TERM CURRENT USE OF INSULIN (HCC): ICD-10-CM

## 2022-05-16 DIAGNOSIS — N20.0 RENAL CALCULI: ICD-10-CM

## 2022-05-16 DIAGNOSIS — D64.9 MILD ANEMIA: ICD-10-CM

## 2022-05-16 DIAGNOSIS — E87.6 HYPOKALEMIA: ICD-10-CM

## 2022-05-16 DIAGNOSIS — E53.8 VITAMIN B12 DEFICIENCY: ICD-10-CM

## 2022-05-16 DIAGNOSIS — N39.0 URINARY TRACT INFECTION WITHOUT HEMATURIA, SITE UNSPECIFIED: Primary | ICD-10-CM

## 2022-05-16 DIAGNOSIS — A41.9 SEVERE SEPSIS (HCC): ICD-10-CM

## 2022-05-16 LAB
ANION GAP SERPL CALC-SCNC: 6 MMOL/L (ref 0–18)
BASOPHILS # BLD AUTO: 0.13 X10(3) UL (ref 0–0.2)
BASOPHILS NFR BLD AUTO: 1 %
BUN BLD-MCNC: 15 MG/DL (ref 7–18)
BUN/CREAT SERPL: 14 (ref 10–20)
CALCIUM BLD-MCNC: 9.8 MG/DL (ref 8.5–10.1)
CHLORIDE SERPL-SCNC: 102 MMOL/L (ref 98–112)
CO2 SERPL-SCNC: 27 MMOL/L (ref 21–32)
CREAT BLD-MCNC: 1.07 MG/DL
CREAT UR-SCNC: 78.9 MG/DL
DEPRECATED RDW RBC AUTO: 47.9 FL (ref 35.1–46.3)
EOSINOPHIL # BLD AUTO: 0.17 X10(3) UL (ref 0–0.7)
EOSINOPHIL NFR BLD AUTO: 1.3 %
ERYTHROCYTE [DISTWIDTH] IN BLOOD BY AUTOMATED COUNT: 14.7 % (ref 11–15)
EST. AVERAGE GLUCOSE BLD GHB EST-MCNC: 134 MG/DL (ref 68–126)
FASTING STATUS PATIENT QL REPORTED: YES
GLUCOSE BLD-MCNC: 117 MG/DL (ref 70–99)
HBA1C MFR BLD: 6.3 % (ref ?–5.7)
HCT VFR BLD AUTO: 45.1 %
HGB BLD-MCNC: 14.1 G/DL
IMM GRANULOCYTES # BLD AUTO: 0.18 X10(3) UL (ref 0–1)
IMM GRANULOCYTES NFR BLD: 1.4 %
LYMPHOCYTES # BLD AUTO: 2.59 X10(3) UL (ref 1–4)
LYMPHOCYTES NFR BLD AUTO: 20.3 %
MCH RBC QN AUTO: 27.5 PG (ref 26–34)
MCHC RBC AUTO-ENTMCNC: 31.3 G/DL (ref 31–37)
MCV RBC AUTO: 88.1 FL
MICROALBUMIN UR-MCNC: 7.73 MG/DL
MICROALBUMIN/CREAT 24H UR-RTO: 98 UG/MG (ref ?–30)
MONOCYTES # BLD AUTO: 0.81 X10(3) UL (ref 0.1–1)
MONOCYTES NFR BLD AUTO: 6.3 %
NEUTROPHILS # BLD AUTO: 8.88 X10 (3) UL (ref 1.5–7.7)
NEUTROPHILS # BLD AUTO: 8.88 X10(3) UL (ref 1.5–7.7)
NEUTROPHILS NFR BLD AUTO: 69.7 %
OSMOLALITY SERPL CALC.SUM OF ELEC: 282 MOSM/KG (ref 275–295)
PLATELET # BLD AUTO: 539 10(3)UL (ref 150–450)
POTASSIUM SERPL-SCNC: 4.3 MMOL/L (ref 3.5–5.1)
RBC # BLD AUTO: 5.12 X10(6)UL
SODIUM SERPL-SCNC: 135 MMOL/L (ref 136–145)
VIT B12 SERPL-MCNC: 449 PG/ML (ref 193–986)
WBC # BLD AUTO: 12.8 X10(3) UL (ref 4–11)

## 2022-05-16 PROCEDURE — 36415 COLL VENOUS BLD VENIPUNCTURE: CPT | Performed by: FAMILY MEDICINE

## 2022-05-16 PROCEDURE — 85025 COMPLETE CBC W/AUTO DIFF WBC: CPT | Performed by: FAMILY MEDICINE

## 2022-05-16 PROCEDURE — 82043 UR ALBUMIN QUANTITATIVE: CPT | Performed by: FAMILY MEDICINE

## 2022-05-16 PROCEDURE — 3078F DIAST BP <80 MM HG: CPT | Performed by: FAMILY MEDICINE

## 2022-05-16 PROCEDURE — 3074F SYST BP LT 130 MM HG: CPT | Performed by: FAMILY MEDICINE

## 2022-05-16 PROCEDURE — 83036 HEMOGLOBIN GLYCOSYLATED A1C: CPT | Performed by: FAMILY MEDICINE

## 2022-05-16 PROCEDURE — 80048 BASIC METABOLIC PNL TOTAL CA: CPT | Performed by: FAMILY MEDICINE

## 2022-05-16 PROCEDURE — 82607 VITAMIN B-12: CPT | Performed by: FAMILY MEDICINE

## 2022-05-16 PROCEDURE — 1111F DSCHRG MED/CURRENT MED MERGE: CPT | Performed by: FAMILY MEDICINE

## 2022-05-16 PROCEDURE — 99214 OFFICE O/P EST MOD 30 MIN: CPT | Performed by: FAMILY MEDICINE

## 2022-05-16 PROCEDURE — 3008F BODY MASS INDEX DOCD: CPT | Performed by: FAMILY MEDICINE

## 2022-05-16 PROCEDURE — 82570 ASSAY OF URINE CREATININE: CPT | Performed by: FAMILY MEDICINE

## 2022-05-19 ENCOUNTER — OFFICE VISIT (OUTPATIENT)
Dept: SURGERY | Facility: CLINIC | Age: 77
End: 2022-05-19
Payer: MEDICAID

## 2022-05-19 DIAGNOSIS — N20.0 KIDNEY STONES: ICD-10-CM

## 2022-05-19 DIAGNOSIS — N40.1 BPH WITH OBSTRUCTION/LOWER URINARY TRACT SYMPTOMS: Primary | ICD-10-CM

## 2022-05-19 DIAGNOSIS — D72.829 LEUKOCYTOSIS, UNSPECIFIED TYPE: Primary | ICD-10-CM

## 2022-05-19 DIAGNOSIS — N13.8 BPH WITH OBSTRUCTION/LOWER URINARY TRACT SYMPTOMS: Primary | ICD-10-CM

## 2022-05-19 DIAGNOSIS — R97.20 ELEVATED PSA: ICD-10-CM

## 2022-05-19 PROCEDURE — 99213 OFFICE O/P EST LOW 20 MIN: CPT | Performed by: UROLOGY

## 2022-05-19 PROCEDURE — 1111F DSCHRG MED/CURRENT MED MERGE: CPT | Performed by: UROLOGY

## 2022-05-21 ENCOUNTER — APPOINTMENT (OUTPATIENT)
Dept: ULTRASOUND IMAGING | Facility: HOSPITAL | Age: 77
End: 2022-05-21
Attending: EMERGENCY MEDICINE
Payer: MEDICAID

## 2022-05-21 ENCOUNTER — HOSPITAL ENCOUNTER (EMERGENCY)
Facility: HOSPITAL | Age: 77
Discharge: HOME | End: 2022-05-21
Attending: EMERGENCY MEDICINE
Payer: MEDICAID

## 2022-05-21 VITALS
HEART RATE: 72 BPM | BODY MASS INDEX: 28 KG/M2 | TEMPERATURE: 98 F | DIASTOLIC BLOOD PRESSURE: 78 MMHG | SYSTOLIC BLOOD PRESSURE: 117 MMHG | OXYGEN SATURATION: 97 % | WEIGHT: 171.06 LBS | RESPIRATION RATE: 18 BRPM

## 2022-05-21 DIAGNOSIS — R31.9 URINARY TRACT INFECTION WITH HEMATURIA, SITE UNSPECIFIED: ICD-10-CM

## 2022-05-21 DIAGNOSIS — R33.9 URINARY RETENTION: Primary | ICD-10-CM

## 2022-05-21 DIAGNOSIS — N39.0 URINARY TRACT INFECTION WITH HEMATURIA, SITE UNSPECIFIED: ICD-10-CM

## 2022-05-21 LAB
ANION GAP SERPL CALC-SCNC: 8 MMOL/L (ref 0–18)
BASOPHILS # BLD AUTO: 0.07 X10(3) UL (ref 0–0.2)
BASOPHILS NFR BLD AUTO: 0.6 %
BILIRUB UR QL: NEGATIVE
BUN BLD-MCNC: 16 MG/DL (ref 7–18)
BUN/CREAT SERPL: 13.3 (ref 10–20)
CALCIUM BLD-MCNC: 9.9 MG/DL (ref 8.5–10.1)
CHLORIDE SERPL-SCNC: 105 MMOL/L (ref 98–112)
CO2 SERPL-SCNC: 26 MMOL/L (ref 21–32)
COLOR UR: YELLOW
CREAT BLD-MCNC: 1.2 MG/DL
DEPRECATED RDW RBC AUTO: 44.8 FL (ref 35.1–46.3)
EOSINOPHIL # BLD AUTO: 0.03 X10(3) UL (ref 0–0.7)
EOSINOPHIL NFR BLD AUTO: 0.3 %
ERYTHROCYTE [DISTWIDTH] IN BLOOD BY AUTOMATED COUNT: 14.3 % (ref 11–15)
GLUCOSE BLD-MCNC: 128 MG/DL (ref 70–99)
GLUCOSE UR-MCNC: NEGATIVE MG/DL
HCT VFR BLD AUTO: 41.7 %
HGB BLD-MCNC: 13.5 G/DL
HYALINE CASTS #/AREA URNS AUTO: PRESENT /LPF
IMM GRANULOCYTES # BLD AUTO: 0.05 X10(3) UL (ref 0–1)
IMM GRANULOCYTES NFR BLD: 0.4 %
KETONES UR-MCNC: NEGATIVE MG/DL
LYMPHOCYTES # BLD AUTO: 1.96 X10(3) UL (ref 1–4)
LYMPHOCYTES NFR BLD AUTO: 16.5 %
MCH RBC QN AUTO: 27.8 PG (ref 26–34)
MCHC RBC AUTO-ENTMCNC: 32.4 G/DL (ref 31–37)
MCV RBC AUTO: 85.8 FL
MONOCYTES # BLD AUTO: 0.9 X10(3) UL (ref 0.1–1)
MONOCYTES NFR BLD AUTO: 7.6 %
NEUTROPHILS # BLD AUTO: 8.87 X10 (3) UL (ref 1.5–7.7)
NEUTROPHILS # BLD AUTO: 8.87 X10(3) UL (ref 1.5–7.7)
NEUTROPHILS NFR BLD AUTO: 74.6 %
NITRITE UR QL STRIP.AUTO: NEGATIVE
OSMOLALITY SERPL CALC.SUM OF ELEC: 291 MOSM/KG (ref 275–295)
PH UR: 6 [PH] (ref 5–8)
PLATELET # BLD AUTO: 375 10(3)UL (ref 150–450)
POTASSIUM SERPL-SCNC: 3.9 MMOL/L (ref 3.5–5.1)
PROT UR-MCNC: 100 MG/DL
RBC # BLD AUTO: 4.86 X10(6)UL
RBC #/AREA URNS AUTO: >10 /HPF
SODIUM SERPL-SCNC: 139 MMOL/L (ref 136–145)
SP GR UR STRIP: 1.02 (ref 1–1.03)
UROBILINOGEN UR STRIP-ACNC: <2
VIT C UR-MCNC: NEGATIVE MG/DL
WBC # BLD AUTO: 11.9 X10(3) UL (ref 4–11)
WBC #/AREA URNS AUTO: >50 /HPF

## 2022-05-21 PROCEDURE — 76770 US EXAM ABDO BACK WALL COMP: CPT | Performed by: EMERGENCY MEDICINE

## 2022-05-21 PROCEDURE — 99285 EMERGENCY DEPT VISIT HI MDM: CPT

## 2022-05-21 PROCEDURE — 85025 COMPLETE CBC W/AUTO DIFF WBC: CPT | Performed by: EMERGENCY MEDICINE

## 2022-05-21 PROCEDURE — 81001 URINALYSIS AUTO W/SCOPE: CPT | Performed by: EMERGENCY MEDICINE

## 2022-05-21 PROCEDURE — 36415 COLL VENOUS BLD VENIPUNCTURE: CPT

## 2022-05-21 PROCEDURE — 51702 INSERT TEMP BLADDER CATH: CPT

## 2022-05-21 PROCEDURE — 80048 BASIC METABOLIC PNL TOTAL CA: CPT | Performed by: EMERGENCY MEDICINE

## 2022-05-21 RX ORDER — CIPROFLOXACIN 500 MG/1
500 TABLET, FILM COATED ORAL 2 TIMES DAILY
Qty: 14 TABLET | Refills: 0 | Status: SHIPPED | OUTPATIENT
Start: 2022-05-21 | End: 2022-05-28

## 2022-05-21 NOTE — ED INITIAL ASSESSMENT (HPI)
Pt to ED via EMS from home with concerns for possible UTI. Pt discharged from [facility] on 5-3-2022 for urosepsis. Pt speaks French # [id number]  Language line used at time of arrival.   Pt with hx of dementia. Pt is alert to self-patient norm per EMS. No respiratory distress noted. Pt skin parameters WNL.

## 2022-05-21 NOTE — ED QUICK NOTES
[id number] Nia Dougherty for discharge per MD.   Flaco [place]ford patient daughter Thelma Latif and reviewed discharge instructions. Reviewed discharge medications Cipro- actions and side effects reviewed. Importance of Urology follow up reviewed.  Teresa Selby called to coordinate home health RN   Pt to discharge with eddy in place.

## 2022-05-21 NOTE — CM/SW NOTE
Spoke with Nely Pichardo with [address] at [place] [address] her that patient, who is established with [facility], will be discharged from the ER with a eddy cath. Brianda Ramirez will inform the [facility] RN, as patient has an RN visit twice a week. Called patient's dtr Severiano Rivers her that [facility] RN has been updated on eddy cath and that patient's RN Riki Lynn will be sending instructions on emptying eddy cath. Ayesha Snyder stated that she knows how to empty cath as patient has had one before. RN and Destin Lyn, updated.

## 2022-05-25 ENCOUNTER — TELEPHONE (OUTPATIENT)
Dept: FAMILY MEDICINE CLINIC | Facility: CLINIC | Age: 77
End: 2022-05-25

## 2022-05-25 NOTE — TELEPHONE ENCOUNTER
Received [facility] order #[id number] order signed by Dr. Benny Edgar and faxed back successful.

## 2022-06-09 ENCOUNTER — TELEPHONE (OUTPATIENT)
Dept: FAMILY MEDICINE CLINIC | Facility: CLINIC | Age: 77
End: 2022-06-09

## 2022-06-09 NOTE — TELEPHONE ENCOUNTER
Received [facility] order # [id number] order signed by Dr. Danielle Manley and faxed back successful.

## 2022-06-11 ENCOUNTER — APPOINTMENT (OUTPATIENT)
Dept: CT IMAGING | Facility: HOSPITAL | Age: 77
End: 2022-06-11
Attending: EMERGENCY MEDICINE
Payer: MEDICAID

## 2022-06-11 ENCOUNTER — HOSPITAL ENCOUNTER (INPATIENT)
Facility: HOSPITAL | Age: 77
LOS: 4 days | Discharge: HOME | End: 2022-06-16
Attending: EMERGENCY MEDICINE | Admitting: HOSPITALIST
Payer: MEDICAID

## 2022-06-11 ENCOUNTER — HOSPITAL ENCOUNTER (INPATIENT)
Facility: HOSPITAL | Age: 77
LOS: 4 days | Discharge: HOME HEALTH | End: 2022-06-16
Attending: EMERGENCY MEDICINE | Admitting: HOSPITALIST
Payer: MEDICAID

## 2022-06-11 DIAGNOSIS — R41.82 ALTERED MENTAL STATUS, UNSPECIFIED ALTERED MENTAL STATUS TYPE: ICD-10-CM

## 2022-06-11 DIAGNOSIS — N30.00 ACUTE CYSTITIS WITHOUT HEMATURIA: Primary | ICD-10-CM

## 2022-06-11 PROBLEM — N17.9 ACUTE KIDNEY INJURY (HCC): Status: ACTIVE | Noted: 2022-06-11

## 2022-06-11 PROBLEM — R73.9 HYPERGLYCEMIA: Status: ACTIVE | Noted: 2022-06-11

## 2022-06-11 PROBLEM — E87.20 METABOLIC ACIDOSIS: Status: ACTIVE | Noted: 2022-06-11

## 2022-06-11 PROBLEM — E87.2 METABOLIC ACIDOSIS: Status: ACTIVE | Noted: 2022-06-11

## 2022-06-11 LAB
ANION GAP SERPL CALC-SCNC: 14 MMOL/L (ref 0–18)
BASOPHILS # BLD AUTO: 0.06 X10(3) UL (ref 0–0.2)
BASOPHILS NFR BLD AUTO: 0.5 %
BILIRUB UR QL: NEGATIVE
BUN BLD-MCNC: 22 MG/DL (ref 7–18)
BUN/CREAT SERPL: 16.3 (ref 10–20)
CALCIUM BLD-MCNC: 9.5 MG/DL (ref 8.5–10.1)
CHLORIDE SERPL-SCNC: 105 MMOL/L (ref 98–112)
CO2 SERPL-SCNC: 21 MMOL/L (ref 21–32)
COLOR UR: YELLOW
CREAT BLD-MCNC: 1.35 MG/DL
DEPRECATED RDW RBC AUTO: 45.2 FL (ref 35.1–46.3)
EOSINOPHIL # BLD AUTO: 0 X10(3) UL (ref 0–0.7)
EOSINOPHIL NFR BLD AUTO: 0 %
ERYTHROCYTE [DISTWIDTH] IN BLOOD BY AUTOMATED COUNT: 14.6 % (ref 11–15)
ETHANOL SERPL-MCNC: <3 MG/DL (ref ?–3)
GLUCOSE BLD-MCNC: 139 MG/DL (ref 70–99)
GLUCOSE UR-MCNC: NEGATIVE MG/DL
HCT VFR BLD AUTO: 39.2 %
HGB BLD-MCNC: 12.8 G/DL
IMM GRANULOCYTES # BLD AUTO: 0.06 X10(3) UL (ref 0–1)
IMM GRANULOCYTES NFR BLD: 0.5 %
KETONES UR-MCNC: NEGATIVE MG/DL
LACTATE SERPL-SCNC: 3.2 MMOL/L (ref 0.4–2)
LYMPHOCYTES # BLD AUTO: 1.08 X10(3) UL (ref 1–4)
LYMPHOCYTES NFR BLD AUTO: 8.2 %
MCH RBC QN AUTO: 27.8 PG (ref 26–34)
MCHC RBC AUTO-ENTMCNC: 32.7 G/DL (ref 31–37)
MCV RBC AUTO: 85.2 FL
MONOCYTES # BLD AUTO: 0.85 X10(3) UL (ref 0.1–1)
MONOCYTES NFR BLD AUTO: 6.4 %
NEUTROPHILS # BLD AUTO: 11.13 X10 (3) UL (ref 1.5–7.7)
NEUTROPHILS # BLD AUTO: 11.13 X10(3) UL (ref 1.5–7.7)
NEUTROPHILS NFR BLD AUTO: 84.4 %
NITRITE UR QL STRIP.AUTO: NEGATIVE
OSMOLALITY SERPL CALC.SUM OF ELEC: 296 MOSM/KG (ref 275–295)
PH UR: 9 [PH] (ref 5–8)
PLATELET # BLD AUTO: 370 10(3)UL (ref 150–450)
POTASSIUM SERPL-SCNC: 4 MMOL/L (ref 3.5–5.1)
PROT UR-MCNC: >=500 MG/DL
RBC # BLD AUTO: 4.6 X10(6)UL
SARS-COV-2 RNA RESP QL NAA+PROBE: NOT DETECTED
SODIUM SERPL-SCNC: 140 MMOL/L (ref 136–145)
SP GR UR STRIP: 1.02 (ref 1–1.03)
UROBILINOGEN UR STRIP-ACNC: <2
VIT C UR-MCNC: NEGATIVE MG/DL
WBC # BLD AUTO: 13.2 X10(3) UL (ref 4–11)
WBC #/AREA URNS AUTO: >50 /HPF

## 2022-06-11 PROCEDURE — 70450 CT HEAD/BRAIN W/O DYE: CPT | Performed by: EMERGENCY MEDICINE

## 2022-06-12 ENCOUNTER — APPOINTMENT (OUTPATIENT)
Dept: CT IMAGING | Facility: HOSPITAL | Age: 77
End: 2022-06-12
Attending: EMERGENCY MEDICINE
Payer: MEDICAID

## 2022-06-12 PROBLEM — R41.82 ALTERED MENTAL STATUS, UNSPECIFIED ALTERED MENTAL STATUS TYPE: Status: ACTIVE | Noted: 2022-06-12

## 2022-06-12 LAB
GLUCOSE BLDC GLUCOMTR-MCNC: 113 MG/DL (ref 70–99)
GLUCOSE BLDC GLUCOMTR-MCNC: 84 MG/DL (ref 70–99)
GLUCOSE BLDC GLUCOMTR-MCNC: 88 MG/DL (ref 70–99)
GLUCOSE BLDC GLUCOMTR-MCNC: 90 MG/DL (ref 70–99)
GLUCOSE BLDC GLUCOMTR-MCNC: 99 MG/DL (ref 70–99)
LACTATE SERPL-SCNC: 1.9 MMOL/L (ref 0.4–2)

## 2022-06-12 PROCEDURE — 99222 1ST HOSP IP/OBS MODERATE 55: CPT | Performed by: HOSPITALIST

## 2022-06-12 PROCEDURE — 74176 CT ABD & PELVIS W/O CONTRAST: CPT | Performed by: EMERGENCY MEDICINE

## 2022-06-12 RX ORDER — QUETIAPINE FUMARATE 25 MG/1
50 TABLET, FILM COATED ORAL 2 TIMES DAILY
Status: DISCONTINUED | OUTPATIENT
Start: 2022-06-12 | End: 2022-06-16

## 2022-06-12 RX ORDER — NICOTINE POLACRILEX 4 MG
30 LOZENGE BUCCAL
Status: DISCONTINUED | OUTPATIENT
Start: 2022-06-12 | End: 2022-06-16

## 2022-06-12 RX ORDER — ACETAMINOPHEN 500 MG
500 TABLET ORAL EVERY 4 HOURS PRN
Status: DISCONTINUED | OUTPATIENT
Start: 2022-06-12 | End: 2022-06-16

## 2022-06-12 RX ORDER — TAMSULOSIN HYDROCHLORIDE 0.4 MG/1
0.4 CAPSULE ORAL DAILY
Status: DISCONTINUED | OUTPATIENT
Start: 2022-06-12 | End: 2022-06-16

## 2022-06-12 RX ORDER — ONDANSETRON 2 MG/ML
4 INJECTION INTRAMUSCULAR; INTRAVENOUS EVERY 6 HOURS PRN
Status: DISCONTINUED | OUTPATIENT
Start: 2022-06-12 | End: 2022-06-16

## 2022-06-12 RX ORDER — SODIUM CHLORIDE 9 MG/ML
INJECTION, SOLUTION INTRAVENOUS CONTINUOUS
Status: DISCONTINUED | OUTPATIENT
Start: 2022-06-12 | End: 2022-06-16

## 2022-06-12 RX ORDER — DONEPEZIL HYDROCHLORIDE 10 MG/1
10 TABLET, FILM COATED ORAL NIGHTLY
Status: DISCONTINUED | OUTPATIENT
Start: 2022-06-12 | End: 2022-06-16

## 2022-06-12 RX ORDER — CHOLECALCIFEROL (VITAMIN D3) 125 MCG
5 CAPSULE ORAL NIGHTLY
COMMUNITY
Start: 2022-05-09 | End: 2022-06-16

## 2022-06-12 RX ORDER — NICOTINE POLACRILEX 4 MG
15 LOZENGE BUCCAL
Status: DISCONTINUED | OUTPATIENT
Start: 2022-06-12 | End: 2022-06-16

## 2022-06-12 RX ORDER — DEXTROSE MONOHYDRATE 25 G/50ML
50 INJECTION, SOLUTION INTRAVENOUS
Status: DISCONTINUED | OUTPATIENT
Start: 2022-06-12 | End: 2022-06-16

## 2022-06-12 NOTE — CM/SW NOTE
06/12/22 1100   /SW Referral Data   Referral Source Physician   Reason for Referral Discharge planning   Informant EMR   Pertinent Medical Hx   Does patient have an established PCP? Yes   Patient Info   Patient's Current Mental Status at Time of Assessment Confused or unable to complete assessment;Memory Impairments   Patient Communication Issues Language barrier   Patient's [address]   Number of Levels in Home 2   Number of Stair in Home 16   Patient lives with Son;Daughter;Grandchild   Patient Status Prior to Admission   Independent with ADLs and Mobility No   Pt. requires assistance with Housework;Driving; Bathing;Medications; Toileting;Finances; Ambulating   Services in place prior to admission [address]  (daughter is paid CG)   Discharge Needs   Anticipated D/C needs Home health care     SW received MDO for DC planning. SW familiar with pt from previous admission. Pt discharged home on 5/9/22 with [facility] [place] [facility]. Pt is unable to participate in  DC planning assessment - pt with HX of dementia. SW called daughter to confirm services have not changed, unable to leave  as mailbox was full. Griselda with [facility] confirmed that pt is no longer receiving [place] services. SW sent new [place] referral to [facility] and entered [facility]. Per  assessment from 5/4/22:  Per daughter, pt was primarily independent with mobility PTA. Pt with HX of dementia. Pt is very forgetful. Does not drive. No DME utilized at home. Pt lives with his son, ASHLEY and 2 grandchildren. 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 No HX of MANJIT.     @12:30PM  Griselda confirmed that [facility] can accept pt at ID. PLAN: pending medical course - home with family, resume homemaker services (thru daughter), [facility]  Need:  - confirmation of DC plan w/ family    ** pt will likely require ambulance transport home      SW remains available for support and/or discharge planning.  Please do not hesitate to call/chat SW if further DC needs arise.      Kang CUELLO, [place]   Ext [phone]

## 2022-06-12 NOTE — PROGRESS NOTES
Following on night admission, for details please see HPI  Pt seen and examined  Patient feels much better now, hungry, wants to eat  No fever  Alejo bag with clear yellow urine today  Urine culture positive for E.  Coli  Lactic acid improved to 3.2 --> 1.9  -Continue meropenem  -Urology consult, Dr. Nicole Jovel will see tomorrow  -Continue fluids  -Renal panel in the morning    D/w RN

## 2022-06-12 NOTE — ED INITIAL ASSESSMENT (HPI)
Pt. Presents to ED via Ems. Per ems pt family said he has been more confused for the last 2days. Pt does have dementia.

## 2022-06-12 NOTE — H&P
[facility]    PATIENT'S NAME: CAROL Corrales   ATTENDING PHYSICIAN: Andres Lunsford MD   PATIENT ACCOUNT#:   [id number]    LOCATION:  [address] [place] [address] RECORD #:   [id number]       YOB: 1945  ADMISSION DATE:       06/11/2022    HISTORY AND PHYSICAL EXAMINATION    CHIEF COMPLAINT:  Altered mental status. HISTORY OF PRESENT ILLNESS:  This is a 66-year-old gentleman who speaks Icelandic. Attempts were made to interview him with the assistance of the language line, but the patient was confused and even with an , history could not be obtained. History is therefore obtained from the medical record and conversation with the emergency room physician. The patient has a history of dementia, but is typically rather talkative with family, but over the last 24 hours, has spoken very little. He has been quite fatigued as well and weak. He has seemed even more confused to them and was speaking nonsense. Family reported to the emergency room physician that the patient has a history of urinary tract infections which have triggered similar symptoms in the past.  Reviewing the patient's previous medical records, he was admitted to our institution in May of this year with a urinary tract infection. Workup revealed left hydroureteronephrosis and nonobstructing kidney stones. He was evaluated by Urology at that time. He left the hospital with a Alejo catheter. Voiding trial apparently at home was unsuccessful and per Nursing, the home catheter was quite dirty. General hygiene was poor. Dr. Beth Montez, during the previous admission, had discussed that if the patient returned with recurrent admissions, something may need to be done about the kidney stone as it might be serving as a nidus for infection. Workup in the emergency room included a urinalysis which was cloudy with large leukocyte esterase, negative nitrite, greater than 50 white cells, 6 to 10 red cells, 3+ bacteria.   His glucose was 139, sodium 140, potassium 4.0, chloride 105, CO2 of 21 BUN 22 with a creatinine of 1.35. Compared to his previous admission, his creatinine was increased slightly. Anion gap was 14. Initial lactic acid level was 3.2. White count was 13.2 with a hemoglobin of 12.8 and a platelet count of 266,148. There were 84% neutrophils. A CT scan of the abdomen and pelvis is performed as well in followup of his previous scan. This revealed no hydronephrosis; mild dilatation involving the left ureter; multiple nonobstructive left superior renal pole calculi, not substantially changed from the prior exam; bilateral perinephric stranding appears symmetric and not substantially changed from previous exam.  Alejo catheter was in a decompressed bladder. There was circumferential bladder wall thickening and surrounding stranding. Mild, non-loculating fluid was present as well as a punctate stone present within the posterior bladder, presumably recently passed. There was a nonobstructive bowel pattern. Bowel gas pattern in the appendix appeared noninflamed. No free air. The patient was placed on meropenem. Review of previous blood cultures revealed Enterobacter cloacae, which is known to possess beta-lactamase properties. He was therefore placed on meropenem in case he had developed a resistant organism. Once the results of cultures are obtained, the antibiotic can be tailored to the specific sensitivities. He is admitted for further evaluation and monitoring. His daughter was unable to provide his medication reconciliation, but said she would be able to do so later in the morning. PAST MEDICAL HISTORY:  Significant for:    1. Recurrent urinary tract infections. 2.   Dementia. 3.   Chronic urinary and fecal incontinence. 4.   Borderline diabetes mellitus type 2.  5.   Hypertension. 6.   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 The patient also had radical neck surgery.   7. B12 deficiency. ALLERGIES:  No known drug allergies. MEDICATIONS:  Prior to admission are unclear at this point, awaiting daughter to verify home medications. FAMILY HISTORY:  Unobtainable, although medical record indicates that there is a history of cancer. SOCIAL HISTORY:  The patient lives with his wife and son. 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 He does not smoke or drink alcohol. No history of drug use. REVIEW OF SYSTEMS:  Unobtainable. PHYSICAL EXAMINATION:    VITAL SIGNS:  Temperature was 98.3 degrees, pulse 85, respiratory rate 20, blood pressure 147/67, O2 saturation 99% on room air. HEENT:  Normocephalic, atraumatic. Pupils equal, reactive. Sclerae anicteric. No obvious sinus tenderness, although the patient is not real cooperative with the physical exam.  He kept pulling the blanket over his head. LUNGS:  Decreased breath sounds, basilar crackles. HEART:  Regular rate and rhythm. Normal S1, S2.  ABDOMEN:  Soft. No distention, no obvious tenderness, but the patient kept rolling over trying to cover up. He did not complain of pain or moan when I evaluated his abdomen. Alejo catheter was present and had been changed by the nurse on the medical floor. EXTREMITIES:  No clubbing, cyanosis, calf tenderness, palpable cords, or edema. SKIN:  Warm and dry. Some blanchable redness in the sacral area without any open skin. NEUROLOGIC:  He was awake and alert, unable to provide any history. PSYCHIATRIC:  His mood and affect were somewhat irritable and not always cooperative. LABORATORY DATA:  Previously mentioned. ASSESSMENT AND PLAN:    1. Sepsis secondary to urinary tract infection. Patient has had Enterobacter cloacae recently which had some resistance in it. Will place on meropenem given the fact that Enterobacter cloacae  can be beta-lactamase positive.   Antibiotics can be tailored to the sensitivity pattern once it is available. 2.   Acute kidney injury, hopefully, secondary to dehydration. Will hydrate and recheck. 3.   Elevated lactic acid level suggesting sepsis with perfusion failure. Blood pressure has been stable. Continue hydration, and the second lactic acid level was within normal limits. 4.   Leukocytosis secondary to #1. Continue antibiotics. Recheck. 5.   Dementia. Unable to obtain history from the patient. Further discussion with family members when they are available in the morning can be had. 6.   Diabetes mellitus type 2. As the patient is confused tonight, we will hold off on feeding and obtain bedside swallow evaluation later in the day, and the patient will, hopefully, be more cooperative. 7.   Hypertension. Hold antihypertensives for now. Treat with parenteral medications if necessary. 8.   I am unable to have a discussion about the patient's current status and proposed treatment plan with him. Further discussion with the family can be had later in the day.     Dictated By Reid Lr MD  d: 06/12/2022 05:53:40  t: 06/12/2022 09:11:36  [facility] [id number]  [facility]/

## 2022-06-13 LAB
ANION GAP SERPL CALC-SCNC: 7 MMOL/L (ref 0–18)
BASOPHILS # BLD AUTO: 0.07 X10(3) UL (ref 0–0.2)
BASOPHILS NFR BLD AUTO: 1 %
BUN BLD-MCNC: 7 MG/DL (ref 7–18)
BUN/CREAT SERPL: 9.5 (ref 10–20)
CALCIUM BLD-MCNC: 8.2 MG/DL (ref 8.5–10.1)
CHLORIDE SERPL-SCNC: 112 MMOL/L (ref 98–112)
CO2 SERPL-SCNC: 23 MMOL/L (ref 21–32)
CREAT BLD-MCNC: 0.74 MG/DL
DEPRECATED RDW RBC AUTO: 47.7 FL (ref 35.1–46.3)
EOSINOPHIL # BLD AUTO: 0.24 X10(3) UL (ref 0–0.7)
EOSINOPHIL NFR BLD AUTO: 3.4 %
ERYTHROCYTE [DISTWIDTH] IN BLOOD BY AUTOMATED COUNT: 14.8 % (ref 11–15)
GLUCOSE BLD-MCNC: 96 MG/DL (ref 70–99)
GLUCOSE BLDC GLUCOMTR-MCNC: 81 MG/DL (ref 70–99)
GLUCOSE BLDC GLUCOMTR-MCNC: 85 MG/DL (ref 70–99)
GLUCOSE BLDC GLUCOMTR-MCNC: 98 MG/DL (ref 70–99)
HCT VFR BLD AUTO: 33.3 %
HGB BLD-MCNC: 10.6 G/DL
IMM GRANULOCYTES # BLD AUTO: 0.04 X10(3) UL (ref 0–1)
IMM GRANULOCYTES NFR BLD: 0.6 %
LYMPHOCYTES # BLD AUTO: 2.16 X10(3) UL (ref 1–4)
LYMPHOCYTES NFR BLD AUTO: 30.6 %
MAGNESIUM SERPL-MCNC: 1.9 MG/DL (ref 1.6–2.6)
MCH RBC QN AUTO: 27.9 PG (ref 26–34)
MCHC RBC AUTO-ENTMCNC: 31.8 G/DL (ref 31–37)
MCV RBC AUTO: 87.6 FL
MONOCYTES # BLD AUTO: 0.62 X10(3) UL (ref 0.1–1)
MONOCYTES NFR BLD AUTO: 8.8 %
NEUTROPHILS # BLD AUTO: 3.94 X10 (3) UL (ref 1.5–7.7)
NEUTROPHILS # BLD AUTO: 3.94 X10(3) UL (ref 1.5–7.7)
NEUTROPHILS NFR BLD AUTO: 55.6 %
OSMOLALITY SERPL CALC.SUM OF ELEC: 292 MOSM/KG (ref 275–295)
PHOSPHATE SERPL-MCNC: 3 MG/DL (ref 2.5–4.9)
PLATELET # BLD AUTO: 254 10(3)UL (ref 150–450)
POTASSIUM SERPL-SCNC: 3.5 MMOL/L (ref 3.5–5.1)
RBC # BLD AUTO: 3.8 X10(6)UL
SODIUM SERPL-SCNC: 142 MMOL/L (ref 136–145)
WBC # BLD AUTO: 7.1 X10(3) UL (ref 4–11)

## 2022-06-13 PROCEDURE — 99233 SBSQ HOSP IP/OBS HIGH 50: CPT | Performed by: HOSPITALIST

## 2022-06-13 PROCEDURE — 99222 1ST HOSP IP/OBS MODERATE 55: CPT | Performed by: NURSE PRACTITIONER

## 2022-06-13 RX ORDER — VANCOMYCIN HYDROCHLORIDE 125 MG/1
125 CAPSULE ORAL DAILY
Status: DISCONTINUED | OUTPATIENT
Start: 2022-06-13 | End: 2022-06-16

## 2022-06-13 NOTE — CM/SW NOTE
SW called and spoke with patient daughter, Agnieszka Lozano to follow up on DC plan. Agnieszka Lozano confirms that patient lives with his son at (address correct on facesheet). Agnieszka Lozano is patient's paid caregiver. Between herself and her brother, patient has 24 hour care at home. Patient does not have DME. Per Renay patient refuses to use DME at home. Confirmed that patient will need ambulance ride home. Confirmed that family is in agreement with [facility]. PLAN: Return home, patient has 24 hour care from his children, daughter is paid caregiver. Agreeable to [facility], orders placed. Will need ambulance ride home. SW to follow case. Please notify as needs arise.        CUATE [place], [facility]    [id number]

## 2022-06-13 NOTE — HOME CARE LIAISON
Discussed with patient's dtr Renay list of [address] providers from [facility], patient choice is [address]. Agency reserved in [facility] and contact information placed on AVS. Financial interest disclosure provided to patient. JACQUELYN/EBONY Smith and Riana Mo updated.

## 2022-06-13 NOTE — PROGRESS NOTES
[facility] Pharmacy Note:  Renal Adjustment for meropenem (MERREM)    Armando Patrick is a 68year old patient who has been prescribed meropenem (MERREM) 500 mg every 12 hrs. The estimated creatinine clearance is 73.9 mL/min (based on SCr of 0.74 mg/dL). The dose has been adjusted to meropenem (MERREM) 500 mg every 8 hrs per hospital renal dose adjustment protocol for treatment of UTI. Pharmacy will follow and adjust dose as warranted for additional renal function changes.     Thank you,    Dalila Bah, PharmD  6/13/2022  7:53 AM

## 2022-06-14 ENCOUNTER — APPOINTMENT (OUTPATIENT)
Dept: PICC SERVICES | Facility: HOSPITAL | Age: 77
End: 2022-06-14
Attending: INTERNAL MEDICINE
Payer: MEDICAID

## 2022-06-14 LAB
ANION GAP SERPL CALC-SCNC: 5 MMOL/L (ref 0–18)
BUN BLD-MCNC: 7 MG/DL (ref 7–18)
BUN/CREAT SERPL: 9.9 (ref 10–20)
CALCIUM BLD-MCNC: 8.5 MG/DL (ref 8.5–10.1)
CHLORIDE SERPL-SCNC: 110 MMOL/L (ref 98–112)
CO2 SERPL-SCNC: 25 MMOL/L (ref 21–32)
CREAT BLD-MCNC: 0.71 MG/DL
DEPRECATED RDW RBC AUTO: 46 FL (ref 35.1–46.3)
ERYTHROCYTE [DISTWIDTH] IN BLOOD BY AUTOMATED COUNT: 14.6 % (ref 11–15)
GLUCOSE BLD-MCNC: 93 MG/DL (ref 70–99)
GLUCOSE BLDC GLUCOMTR-MCNC: 103 MG/DL (ref 70–99)
GLUCOSE BLDC GLUCOMTR-MCNC: 126 MG/DL (ref 70–99)
GLUCOSE BLDC GLUCOMTR-MCNC: 85 MG/DL (ref 70–99)
GLUCOSE BLDC GLUCOMTR-MCNC: 86 MG/DL (ref 70–99)
GLUCOSE BLDC GLUCOMTR-MCNC: 91 MG/DL (ref 70–99)
HCT VFR BLD AUTO: 35.3 %
HGB BLD-MCNC: 11.3 G/DL
MCH RBC QN AUTO: 27.6 PG (ref 26–34)
MCHC RBC AUTO-ENTMCNC: 32 G/DL (ref 31–37)
MCV RBC AUTO: 86.1 FL
OSMOLALITY SERPL CALC.SUM OF ELEC: 288 MOSM/KG (ref 275–295)
PLATELET # BLD AUTO: 292 10(3)UL (ref 150–450)
POTASSIUM SERPL-SCNC: 3.4 MMOL/L (ref 3.5–5.1)
RBC # BLD AUTO: 4.1 X10(6)UL
SODIUM SERPL-SCNC: 140 MMOL/L (ref 136–145)
WBC # BLD AUTO: 7.4 X10(3) UL (ref 4–11)

## 2022-06-14 RX ORDER — POTASSIUM CHLORIDE 1.5 G/1.77G
40 POWDER, FOR SOLUTION ORAL ONCE
Status: COMPLETED | OUTPATIENT
Start: 2022-06-14 | End: 2022-06-14

## 2022-06-14 RX ORDER — LISINOPRIL 5 MG/1
5 TABLET ORAL DAILY
Status: DISCONTINUED | OUTPATIENT
Start: 2022-06-14 | End: 2022-06-15

## 2022-06-14 RX ORDER — LIDOCAINE HYDROCHLORIDE 10 MG/ML
5 INJECTION, SOLUTION EPIDURAL; INFILTRATION; INTRACAUDAL; PERINEURAL
Status: ACTIVE | OUTPATIENT
Start: 2022-06-14 | End: 2022-06-15

## 2022-06-14 NOTE — CM/SW NOTE
Patient was discussed in rounds, will require IV abx upon DC. Attempted to call daughter, Nehemias Nunez, no answer and unable to LVM due to VM box being full. Will attempt to call again again. Patient is already set up with [facility]. Tentative referrals pending in [place] for home infusion to check cost/coverage awaiting review and response. PLAN: Long Term IV abx. Accepted w [facility]. Need to confirm IV abx preference with daughter. Tentative referral pending in [place] for home infusion to check cost/coverage. Need final orders.      Laurel Salas, CUATE, [facility]    [id number]

## 2022-06-15 LAB
ANION GAP SERPL CALC-SCNC: 5 MMOL/L (ref 0–18)
BUN BLD-MCNC: 6 MG/DL (ref 7–18)
BUN/CREAT SERPL: 7.8 (ref 10–20)
CALCIUM BLD-MCNC: 8.5 MG/DL (ref 8.5–10.1)
CHLORIDE SERPL-SCNC: 108 MMOL/L (ref 98–112)
CO2 SERPL-SCNC: 27 MMOL/L (ref 21–32)
CREAT BLD-MCNC: 0.77 MG/DL
DEPRECATED RDW RBC AUTO: 44.3 FL (ref 35.1–46.3)
ERYTHROCYTE [DISTWIDTH] IN BLOOD BY AUTOMATED COUNT: 14.6 % (ref 11–15)
GLUCOSE BLD-MCNC: 97 MG/DL (ref 70–99)
GLUCOSE BLDC GLUCOMTR-MCNC: 120 MG/DL (ref 70–99)
GLUCOSE BLDC GLUCOMTR-MCNC: 80 MG/DL (ref 70–99)
GLUCOSE BLDC GLUCOMTR-MCNC: 88 MG/DL (ref 70–99)
GLUCOSE BLDC GLUCOMTR-MCNC: 92 MG/DL (ref 70–99)
HCT VFR BLD AUTO: 34.8 %
HGB BLD-MCNC: 11.6 G/DL
MCH RBC QN AUTO: 28.2 PG (ref 26–34)
MCHC RBC AUTO-ENTMCNC: 33.3 G/DL (ref 31–37)
MCV RBC AUTO: 84.5 FL
OSMOLALITY SERPL CALC.SUM OF ELEC: 288 MOSM/KG (ref 275–295)
PLATELET # BLD AUTO: 318 10(3)UL (ref 150–450)
POTASSIUM SERPL-SCNC: 3.5 MMOL/L (ref 3.5–5.1)
POTASSIUM SERPL-SCNC: 3.5 MMOL/L (ref 3.5–5.1)
RBC # BLD AUTO: 4.12 X10(6)UL
SODIUM SERPL-SCNC: 140 MMOL/L (ref 136–145)
WBC # BLD AUTO: 7.7 X10(3) UL (ref 4–11)

## 2022-06-15 PROCEDURE — 99233 SBSQ HOSP IP/OBS HIGH 50: CPT | Performed by: HOSPITALIST

## 2022-06-15 RX ORDER — VANCOMYCIN HYDROCHLORIDE 125 MG/1
125 CAPSULE ORAL DAILY
Qty: 17 CAPSULE | Refills: 0 | Status: SHIPPED | OUTPATIENT
Start: 2022-06-16

## 2022-06-15 RX ORDER — ACETAMINOPHEN 500 MG
500 TABLET ORAL EVERY 4 HOURS PRN
Refills: 0 | Status: SHIPPED | COMMUNITY
Start: 2022-06-15

## 2022-06-15 RX ORDER — LISINOPRIL 10 MG/1
10 TABLET ORAL DAILY
Qty: 30 TABLET | Refills: 0 | Status: SHIPPED | OUTPATIENT
Start: 2022-06-16

## 2022-06-15 RX ORDER — LISINOPRIL 5 MG/1
5 TABLET ORAL ONCE
Status: COMPLETED | OUTPATIENT
Start: 2022-06-15 | End: 2022-06-15

## 2022-06-15 RX ORDER — ERTAPENEM 1 G/1
1 INJECTION, POWDER, LYOPHILIZED, FOR SOLUTION INTRAMUSCULAR; INTRAVENOUS DAILY
Qty: 10 EACH | Refills: 0 | Status: SHIPPED | OUTPATIENT
Start: 2022-06-15 | End: 2022-06-25

## 2022-06-15 RX ORDER — LISINOPRIL 10 MG/1
10 TABLET ORAL DAILY
Status: DISCONTINUED | OUTPATIENT
Start: 2022-06-16 | End: 2022-06-16

## 2022-06-15 NOTE — CM/SW NOTE
EBONY requested ID team to print final rx to upload to home infusion referral so that final cost can be determined. 1145am   EBONY obtained finalized rx from ID team, patient is covered at 100% for home infusion, however insurance Ellie Dills is needed. PLAN: Long Term IV abx. Accepted w [facility]. [facility] for IV abx, pending insurance authorization.    [id number] CUATE Ahn, [facility]    [id number]

## 2022-06-16 VITALS
SYSTOLIC BLOOD PRESSURE: 116 MMHG | RESPIRATION RATE: 18 BRPM | TEMPERATURE: 98 F | HEIGHT: 65 IN | BODY MASS INDEX: 28.64 KG/M2 | DIASTOLIC BLOOD PRESSURE: 57 MMHG | OXYGEN SATURATION: 98 % | HEART RATE: 67 BPM | WEIGHT: 171.88 LBS

## 2022-06-16 LAB
GLUCOSE BLDC GLUCOMTR-MCNC: 110 MG/DL (ref 70–99)
GLUCOSE BLDC GLUCOMTR-MCNC: 124 MG/DL (ref 70–99)
GLUCOSE BLDC GLUCOMTR-MCNC: 85 MG/DL (ref 70–99)

## 2022-06-16 NOTE — CM/SW NOTE
Requested update from [facility] regarding insurance authorization for Rx, awaiting review and response. 1058am  Notified by Soraida Nguyen at [facility] that Ellie Bill is still pending at this time. 1134am  Notified that insurance auth obtained by [facility]. Called and notified daughter. Daughter reports patient will need ambulance ride home once DC confirmed. Reports family will be at home all day to let patient in.     333pm  Attempted to call daughter 2x to notify of transfer time. No answer an unable to LVM due to VM box being full. PLAN: Home w [facility] and [facility]. [facility] ambulance set for 430. PCS DONE. Family will be home to let patient in.      CUATE Pink, [facility]    [id number]

## 2022-06-17 ENCOUNTER — PATIENT OUTREACH (OUTPATIENT)
Dept: CASE MANAGEMENT | Age: 77
End: 2022-06-17

## 2022-06-17 DIAGNOSIS — N39.0 SEPSIS DUE TO URINARY TRACT INFECTION (HCC): ICD-10-CM

## 2022-06-17 DIAGNOSIS — R41.82 ALTERED MENTAL STATUS, UNSPECIFIED ALTERED MENTAL STATUS TYPE: ICD-10-CM

## 2022-06-17 DIAGNOSIS — E11.8 CONTROLLED TYPE 2 DIABETES MELLITUS WITH COMPLICATION, WITHOUT LONG-TERM CURRENT USE OF INSULIN (HCC): ICD-10-CM

## 2022-06-17 DIAGNOSIS — A41.9 SEPSIS DUE TO URINARY TRACT INFECTION (HCC): ICD-10-CM

## 2022-06-17 DIAGNOSIS — N30.00 ACUTE CYSTITIS WITHOUT HEMATURIA: ICD-10-CM

## 2022-06-17 PROCEDURE — 1111F DSCHRG MED/CURRENT MED MERGE: CPT

## 2022-06-17 NOTE — PROGRESS NOTES
Attempted to contact pt for condition update however no answer. Call continued to ring and then disconnected. Unable to leave a VM at this time. NCM to try again at a later time.

## 2022-06-20 ENCOUNTER — TELEPHONE (OUTPATIENT)
Dept: FAMILY MEDICINE CLINIC | Facility: CLINIC | Age: 77
End: 2022-06-20

## 2022-06-20 NOTE — TELEPHONE ENCOUNTER
RN Zoe Sharma RN with [facility]       Left detailed message on her confidential voicemail, advising to contact psychiatrist or neurologist.

## 2022-06-20 NOTE — TELEPHONE ENCOUNTER
Per Tip Diop with [facility], patient was admitted to home health on 6/17. RN will be sending orders for Dr. Suzi Spangler to sign. Per RN, patient is taking quetiapine which interacts with donepezil. Please advise if Dr. Suzi Spangler is aware of the interaction and if any changes should be made to patient's medications.

## 2022-06-20 NOTE — TELEPHONE ENCOUNTER
Rachell Acevedo states that she saw patient for a PT evaluation today. Plan of care is twice a week for 1 week. They will be faxing over the order for doctor's signature.

## 2022-06-21 ENCOUNTER — HOSPITAL ENCOUNTER (EMERGENCY)
Facility: HOSPITAL | Age: 77
Discharge: HOME | End: 2022-06-21
Attending: EMERGENCY MEDICINE
Payer: MEDICAID

## 2022-06-21 ENCOUNTER — LAB REQUISITION (OUTPATIENT)
Dept: LAB | Facility: HOSPITAL | Age: 77
End: 2022-06-21
Payer: MEDICAID

## 2022-06-21 ENCOUNTER — APPOINTMENT (OUTPATIENT)
Dept: PICC SERVICES | Facility: HOSPITAL | Age: 77
End: 2022-06-21
Attending: INTERNAL MEDICINE
Payer: MEDICAID

## 2022-06-21 VITALS
BODY MASS INDEX: 25.71 KG/M2 | SYSTOLIC BLOOD PRESSURE: 126 MMHG | RESPIRATION RATE: 18 BRPM | OXYGEN SATURATION: 97 % | HEART RATE: 70 BPM | WEIGHT: 160 LBS | HEIGHT: 66 IN | DIASTOLIC BLOOD PRESSURE: 61 MMHG

## 2022-06-21 DIAGNOSIS — N39.0 URINARY TRACT INFECTION, SITE NOT SPECIFIED: ICD-10-CM

## 2022-06-21 DIAGNOSIS — Z78.9 POOR INTRAVENOUS ACCESS: Primary | ICD-10-CM

## 2022-06-21 LAB
ALBUMIN SERPL-MCNC: 2.9 G/DL (ref 3.4–5)
ALBUMIN/GLOB SERPL: 0.6 {RATIO} (ref 1–2)
ALP LIVER SERPL-CCNC: 75 U/L
ALT SERPL-CCNC: 13 U/L
ANION GAP SERPL CALC-SCNC: 6 MMOL/L (ref 0–18)
AST SERPL-CCNC: 25 U/L (ref 15–37)
BASOPHILS # BLD AUTO: 0.07 X10(3) UL (ref 0–0.2)
BASOPHILS NFR BLD AUTO: 1 %
BILIRUB SERPL-MCNC: 0.9 MG/DL (ref 0.1–2)
BUN BLD-MCNC: 15 MG/DL (ref 7–18)
BUN/CREAT SERPL: 16.1 (ref 10–20)
CALCIUM BLD-MCNC: 9.2 MG/DL (ref 8.5–10.1)
CHLORIDE SERPL-SCNC: 104 MMOL/L (ref 98–112)
CO2 SERPL-SCNC: 26 MMOL/L (ref 21–32)
CREAT BLD-MCNC: 0.93 MG/DL
DEPRECATED RDW RBC AUTO: 47.9 FL (ref 35.1–46.3)
EOSINOPHIL # BLD AUTO: 0.21 X10(3) UL (ref 0–0.7)
EOSINOPHIL NFR BLD AUTO: 2.9 %
ERYTHROCYTE [DISTWIDTH] IN BLOOD BY AUTOMATED COUNT: 14.8 % (ref 11–15)
FASTING STATUS PATIENT QL REPORTED: NO
GLOBULIN PLAS-MCNC: 5.2 G/DL (ref 2.8–4.4)
GLUCOSE BLD-MCNC: 82 MG/DL (ref 70–99)
HCT VFR BLD AUTO: 43.9 %
HGB BLD-MCNC: 14 G/DL
IMM GRANULOCYTES # BLD AUTO: 0.02 X10(3) UL (ref 0–1)
IMM GRANULOCYTES NFR BLD: 0.3 %
LYMPHOCYTES # BLD AUTO: 2.62 X10(3) UL (ref 1–4)
LYMPHOCYTES NFR BLD AUTO: 35.7 %
MCH RBC QN AUTO: 28 PG (ref 26–34)
MCHC RBC AUTO-ENTMCNC: 31.9 G/DL (ref 31–37)
MCV RBC AUTO: 87.8 FL
MONOCYTES # BLD AUTO: 0.52 X10(3) UL (ref 0.1–1)
MONOCYTES NFR BLD AUTO: 7.1 %
NEUTROPHILS # BLD AUTO: 3.89 X10 (3) UL (ref 1.5–7.7)
NEUTROPHILS # BLD AUTO: 3.89 X10(3) UL (ref 1.5–7.7)
NEUTROPHILS NFR BLD AUTO: 53 %
OSMOLALITY SERPL CALC.SUM OF ELEC: 282 MOSM/KG (ref 275–295)
PLATELET # BLD AUTO: 330 10(3)UL (ref 150–450)
POTASSIUM SERPL-SCNC: 4.4 MMOL/L (ref 3.5–5.1)
PROT SERPL-MCNC: 8.1 G/DL (ref 6.4–8.2)
RBC # BLD AUTO: 5 X10(6)UL
SODIUM SERPL-SCNC: 136 MMOL/L (ref 136–145)
WBC # BLD AUTO: 7.3 X10(3) UL (ref 4–11)

## 2022-06-21 PROCEDURE — 99285 EMERGENCY DEPT VISIT HI MDM: CPT

## 2022-06-21 PROCEDURE — 80053 COMPREHEN METABOLIC PANEL: CPT

## 2022-06-21 PROCEDURE — 85025 COMPLETE CBC W/AUTO DIFF WBC: CPT | Performed by: INTERNAL MEDICINE

## 2022-06-21 PROCEDURE — 85025 COMPLETE CBC W/AUTO DIFF WBC: CPT

## 2022-06-21 PROCEDURE — 80053 COMPREHEN METABOLIC PANEL: CPT | Performed by: INTERNAL MEDICINE

## 2022-06-21 NOTE — ED INITIAL ASSESSMENT (HPI)
Arrives via EMS for midline displacement. Pt receiving anbx for UTI.  Per EMS, care taker midline slightly displaced while changing him

## 2022-06-22 ENCOUNTER — TELEPHONE (OUTPATIENT)
Dept: FAMILY MEDICINE CLINIC | Facility: CLINIC | Age: 77
End: 2022-06-22

## 2022-06-22 NOTE — TELEPHONE ENCOUNTER
On call  Received called from [facility] physical therapist, stating daughter in law, who is caring for the patient in this moment was concerned that was patient is urinating less. Contacted daughter in law, Hank Trotter, she states she emptied bag yesterday and urine was normal, since last night patient has only produced 30 ml, she pushed 4 glasses of water with no urine production, and urine is reddish as well, patient is having no complains. Instructed patient to be taken to ER to evaluate for cause of decreased urine production, she verbalized understanding and agreed with plan.

## 2022-06-23 NOTE — TELEPHONE ENCOUNTER
Received [facility] order # [id number] order signed by Dr. Jonathan Ashton and faxed back successful.

## 2022-06-27 ENCOUNTER — TELEPHONE (OUTPATIENT)
Dept: FAMILY MEDICINE CLINIC | Facility: CLINIC | Age: 77
End: 2022-06-27

## 2022-06-27 NOTE — TELEPHONE ENCOUNTER
Spoke with Marjan Hunt RN--requesting verbal order to discharge patient from home health today--schedled to see him around 2 p.m. Gilbert has been cleared by ID, Dr. Lisa Palomares to discontinue PICC line today--ok to leave detailed message on confidential VM with Dr. Angelia Barragan response, but must leave name on message, per insurance requirements.     Please advise

## 2022-06-27 NOTE — TELEPHONE ENCOUNTER
I have not seen patient in the office since discharge.   If home health feels he is stable then can DC

## 2022-06-27 NOTE — TELEPHONE ENCOUNTER
Received [facility] order # [id number] from [facility] order signed by Dr. Kyle Mathew and faxed back successful.

## 2022-06-28 ENCOUNTER — OFFICE VISIT (OUTPATIENT)
Dept: FAMILY MEDICINE CLINIC | Facility: CLINIC | Age: 77
End: 2022-06-28
Payer: MEDICAID

## 2022-06-28 VITALS
TEMPERATURE: 98 F | HEIGHT: 66 IN | HEART RATE: 74 BPM | DIASTOLIC BLOOD PRESSURE: 65 MMHG | SYSTOLIC BLOOD PRESSURE: 106 MMHG | BODY MASS INDEX: 27.16 KG/M2 | WEIGHT: 169 LBS

## 2022-06-28 DIAGNOSIS — N20.0 RENAL CALCULI: ICD-10-CM

## 2022-06-28 DIAGNOSIS — Z16.12 UTI DUE TO EXTENDED-SPECTRUM BETA LACTAMASE (ESBL) PRODUCING ESCHERICHIA COLI: Primary | ICD-10-CM

## 2022-06-28 DIAGNOSIS — N40.1 BPH WITH OBSTRUCTION/LOWER URINARY TRACT SYMPTOMS: ICD-10-CM

## 2022-06-28 DIAGNOSIS — N13.8 BPH WITH OBSTRUCTION/LOWER URINARY TRACT SYMPTOMS: ICD-10-CM

## 2022-06-28 DIAGNOSIS — N13.30 HYDROURETERONEPHROSIS: ICD-10-CM

## 2022-06-28 DIAGNOSIS — N39.0 UTI DUE TO EXTENDED-SPECTRUM BETA LACTAMASE (ESBL) PRODUCING ESCHERICHIA COLI: Primary | ICD-10-CM

## 2022-06-28 DIAGNOSIS — N40.0 PROSTATE ENLARGEMENT: ICD-10-CM

## 2022-06-28 DIAGNOSIS — A41.9 SEVERE SEPSIS (HCC): ICD-10-CM

## 2022-06-28 DIAGNOSIS — E11.8 CONTROLLED TYPE 2 DIABETES MELLITUS WITH COMPLICATION, WITHOUT LONG-TERM CURRENT USE OF INSULIN (HCC): ICD-10-CM

## 2022-06-28 DIAGNOSIS — K57.30 COLON, DIVERTICULOSIS: ICD-10-CM

## 2022-06-28 DIAGNOSIS — E53.8 VITAMIN B12 DEFICIENCY: ICD-10-CM

## 2022-06-28 DIAGNOSIS — B96.29 UTI DUE TO EXTENDED-SPECTRUM BETA LACTAMASE (ESBL) PRODUCING ESCHERICHIA COLI: Primary | ICD-10-CM

## 2022-06-28 DIAGNOSIS — Z85.810 HISTORY OF TONGUE CANCER: ICD-10-CM

## 2022-06-28 DIAGNOSIS — R65.20 SEVERE SEPSIS (HCC): ICD-10-CM

## 2022-06-28 DIAGNOSIS — K76.9 LIVER LESION, RIGHT LOBE: ICD-10-CM

## 2022-06-28 DIAGNOSIS — F03.91 DEMENTIA WITH BEHAVIORAL DISTURBANCE, UNSPECIFIED DEMENTIA TYPE (HCC): ICD-10-CM

## 2022-06-28 DIAGNOSIS — I10 ESSENTIAL HYPERTENSION: ICD-10-CM

## 2022-06-28 PROCEDURE — 1111F DSCHRG MED/CURRENT MED MERGE: CPT | Performed by: FAMILY MEDICINE

## 2022-06-28 PROCEDURE — 3078F DIAST BP <80 MM HG: CPT | Performed by: FAMILY MEDICINE

## 2022-06-28 PROCEDURE — 3074F SYST BP LT 130 MM HG: CPT | Performed by: FAMILY MEDICINE

## 2022-06-28 PROCEDURE — 99214 OFFICE O/P EST MOD 30 MIN: CPT | Performed by: FAMILY MEDICINE

## 2022-06-28 PROCEDURE — 3008F BODY MASS INDEX DOCD: CPT | Performed by: FAMILY MEDICINE

## 2022-07-01 ENCOUNTER — TELEPHONE (OUTPATIENT)
Dept: SURGERY | Facility: CLINIC | Age: 77
End: 2022-07-01

## 2022-07-01 NOTE — TELEPHONE ENCOUNTER
Patients daughter requesting call back. Daughter states he was in the ER 2 week ago and has a urine bag and is having issues with it.  Please advise

## 2022-07-05 ENCOUNTER — TELEPHONE (OUTPATIENT)
Dept: FAMILY MEDICINE CLINIC | Facility: CLINIC | Age: 77
End: 2022-07-05

## 2022-07-05 NOTE — TELEPHONE ENCOUNTER
[facility] order [id number] to discharge pt from [address] [place] [address] [facility] by Dr. Soham Murphy and faxed back successful.

## 2022-07-07 ENCOUNTER — MED REC SCAN ONLY (OUTPATIENT)
Dept: FAMILY MEDICINE CLINIC | Facility: CLINIC | Age: 77
End: 2022-07-07

## 2022-07-13 ENCOUNTER — HOSPITAL ENCOUNTER (OUTPATIENT)
Dept: ULTRASOUND IMAGING | Age: 77
Discharge: HOME | End: 2022-07-13
Attending: FAMILY MEDICINE
Payer: MEDICAID

## 2022-07-13 DIAGNOSIS — K76.9 LIVER LESION, RIGHT LOBE: ICD-10-CM

## 2022-07-13 PROCEDURE — 76705 ECHO EXAM OF ABDOMEN: CPT | Performed by: FAMILY MEDICINE

## 2022-07-14 DIAGNOSIS — K76.9 LIVER LESION, RIGHT LOBE: Primary | ICD-10-CM

## 2022-08-01 ENCOUNTER — HOSPITAL ENCOUNTER (INPATIENT)
Facility: HOSPITAL | Age: 77
LOS: 1 days | Discharge: HOME | End: 2022-08-03
Attending: EMERGENCY MEDICINE | Admitting: HOSPITALIST
Payer: MEDICAID

## 2022-08-01 ENCOUNTER — APPOINTMENT (OUTPATIENT)
Dept: CT IMAGING | Facility: HOSPITAL | Age: 77
End: 2022-08-01
Attending: EMERGENCY MEDICINE
Payer: MEDICAID

## 2022-08-01 DIAGNOSIS — Z86.19 HISTORY OF ESBL E. COLI INFECTION: ICD-10-CM

## 2022-08-01 DIAGNOSIS — T83.511A URINARY TRACT INFECTION ASSOCIATED WITH INDWELLING URETHRAL CATHETER, INITIAL ENCOUNTER (HCC): Primary | ICD-10-CM

## 2022-08-01 DIAGNOSIS — N39.0 URINARY TRACT INFECTION ASSOCIATED WITH INDWELLING URETHRAL CATHETER, INITIAL ENCOUNTER (HCC): Primary | ICD-10-CM

## 2022-08-01 LAB
ANION GAP SERPL CALC-SCNC: 10 MMOL/L (ref 0–18)
BASOPHILS # BLD AUTO: 0.07 X10(3) UL (ref 0–0.2)
BASOPHILS NFR BLD AUTO: 0.6 %
BILIRUB UR QL: NEGATIVE
BUN BLD-MCNC: 15 MG/DL (ref 7–18)
BUN/CREAT SERPL: 13.9 (ref 10–20)
CALCIUM BLD-MCNC: 9.4 MG/DL (ref 8.5–10.1)
CHLORIDE SERPL-SCNC: 102 MMOL/L (ref 98–112)
CLARITY UR: CLEAR
CO2 SERPL-SCNC: 22 MMOL/L (ref 21–32)
COLOR UR: YELLOW
CREAT BLD-MCNC: 1.08 MG/DL
DEPRECATED RDW RBC AUTO: 44.8 FL (ref 35.1–46.3)
EOSINOPHIL # BLD AUTO: 0.05 X10(3) UL (ref 0–0.7)
EOSINOPHIL NFR BLD AUTO: 0.4 %
ERYTHROCYTE [DISTWIDTH] IN BLOOD BY AUTOMATED COUNT: 14 % (ref 11–15)
GLUCOSE BLD-MCNC: 108 MG/DL (ref 70–99)
GLUCOSE BLDC GLUCOMTR-MCNC: 101 MG/DL (ref 70–99)
GLUCOSE UR-MCNC: NEGATIVE MG/DL
HCT VFR BLD AUTO: 41.2 %
HGB BLD-MCNC: 13.3 G/DL
IMM GRANULOCYTES # BLD AUTO: 0.04 X10(3) UL (ref 0–1)
IMM GRANULOCYTES NFR BLD: 0.3 %
LACTATE SERPL-SCNC: 2.2 MMOL/L (ref 0.4–2)
LYMPHOCYTES # BLD AUTO: 1.75 X10(3) UL (ref 1–4)
LYMPHOCYTES NFR BLD AUTO: 14 %
MCH RBC QN AUTO: 27.9 PG (ref 26–34)
MCHC RBC AUTO-ENTMCNC: 32.3 G/DL (ref 31–37)
MCV RBC AUTO: 86.4 FL
MONOCYTES # BLD AUTO: 0.86 X10(3) UL (ref 0.1–1)
MONOCYTES NFR BLD AUTO: 6.9 %
NEUTROPHILS # BLD AUTO: 9.75 X10 (3) UL (ref 1.5–7.7)
NEUTROPHILS # BLD AUTO: 9.75 X10(3) UL (ref 1.5–7.7)
NEUTROPHILS NFR BLD AUTO: 77.8 %
NITRITE UR QL STRIP.AUTO: NEGATIVE
OSMOLALITY SERPL CALC.SUM OF ELEC: 279 MOSM/KG (ref 275–295)
PH UR: 5 [PH] (ref 5–8)
PLATELET # BLD AUTO: 329 10(3)UL (ref 150–450)
POTASSIUM SERPL-SCNC: 3.9 MMOL/L (ref 3.5–5.1)
PROCALCITONIN SERPL-MCNC: <0.02 NG/ML (ref ?–0.16)
RBC # BLD AUTO: 4.77 X10(6)UL
RBC #/AREA URNS AUTO: >10 /HPF
RBC #/AREA URNS AUTO: >10 /HPF
SARS-COV-2 RNA RESP QL NAA+PROBE: NOT DETECTED
SODIUM SERPL-SCNC: 134 MMOL/L (ref 136–145)
SP GR UR STRIP: 1.01 (ref 1–1.03)
UROBILINOGEN UR STRIP-ACNC: 0.2
WBC # BLD AUTO: 12.5 X10(3) UL (ref 4–11)
WBC #/AREA URNS AUTO: >50 /HPF
WBC #/AREA URNS AUTO: >50 /HPF
WBC CLUMPS UR QL AUTO: PRESENT /HPF
WBC CLUMPS UR QL AUTO: PRESENT /HPF

## 2022-08-01 PROCEDURE — 99223 1ST HOSP IP/OBS HIGH 75: CPT | Performed by: HOSPITALIST

## 2022-08-01 PROCEDURE — 74176 CT ABD & PELVIS W/O CONTRAST: CPT | Performed by: EMERGENCY MEDICINE

## 2022-08-01 RX ORDER — HALOPERIDOL 5 MG/ML
5 INJECTION INTRAMUSCULAR ONCE
Status: COMPLETED | OUTPATIENT
Start: 2022-08-01 | End: 2022-08-02

## 2022-08-01 RX ORDER — LIDOCAINE HYDROCHLORIDE 20 MG/ML
10 JELLY TOPICAL ONCE
Status: COMPLETED | OUTPATIENT
Start: 2022-08-01 | End: 2022-08-01

## 2022-08-01 RX ORDER — MIDAZOLAM HYDROCHLORIDE 10 MG/2ML
10 INJECTION, SOLUTION INTRAMUSCULAR; INTRAVENOUS ONCE
Status: COMPLETED | OUTPATIENT
Start: 2022-08-01 | End: 2022-08-01

## 2022-08-02 ENCOUNTER — TELEPHONE (OUTPATIENT)
Dept: SURGERY | Facility: CLINIC | Age: 77
End: 2022-08-02

## 2022-08-02 PROBLEM — T83.511A URINARY TRACT INFECTION ASSOCIATED WITH INDWELLING URETHRAL CATHETER, INITIAL ENCOUNTER: Status: ACTIVE | Noted: 2022-08-02

## 2022-08-02 PROBLEM — Z86.19 HISTORY OF ESBL E. COLI INFECTION: Status: ACTIVE | Noted: 2022-08-02

## 2022-08-02 PROBLEM — T83.511A URINARY TRACT INFECTION ASSOCIATED WITH INDWELLING URETHRAL CATHETER, INITIAL ENCOUNTER (HCC): Status: ACTIVE | Noted: 2022-08-02

## 2022-08-02 PROBLEM — N39.0 URINARY TRACT INFECTION ASSOCIATED WITH INDWELLING URETHRAL CATHETER, INITIAL ENCOUNTER: Status: ACTIVE | Noted: 2022-08-02

## 2022-08-02 PROBLEM — N39.0 URINARY TRACT INFECTION ASSOCIATED WITH INDWELLING URETHRAL CATHETER, INITIAL ENCOUNTER (HCC): Status: ACTIVE | Noted: 2022-08-02

## 2022-08-02 PROBLEM — N39.0 URINARY TRACT INFECTION ASSOCIATED WITH INDWELLING URETHRAL CATHETER, INITIAL ENCOUNTER  (HCC): Status: ACTIVE | Noted: 2022-08-02

## 2022-08-02 PROBLEM — T83.511A URINARY TRACT INFECTION ASSOCIATED WITH INDWELLING URETHRAL CATHETER, INITIAL ENCOUNTER  (HCC): Status: ACTIVE | Noted: 2022-08-02

## 2022-08-02 LAB
ADENOVIRUS PCR:: NOT DETECTED
ANION GAP SERPL CALC-SCNC: 5 MMOL/L (ref 0–18)
B PARAPERT DNA SPEC QL NAA+PROBE: NOT DETECTED
B PERT DNA SPEC QL NAA+PROBE: NOT DETECTED
BASOPHILS # BLD AUTO: 0.08 X10(3) UL (ref 0–0.2)
BASOPHILS NFR BLD AUTO: 1 %
BUN BLD-MCNC: 10 MG/DL (ref 7–18)
BUN/CREAT SERPL: 13 (ref 10–20)
C PNEUM DNA SPEC QL NAA+PROBE: NOT DETECTED
CALCIUM BLD-MCNC: 8.5 MG/DL (ref 8.5–10.1)
CHLORIDE SERPL-SCNC: 111 MMOL/L (ref 98–112)
CO2 SERPL-SCNC: 23 MMOL/L (ref 21–32)
CORONAVIRUS 229E PCR:: NOT DETECTED
CORONAVIRUS HKU1 PCR:: NOT DETECTED
CORONAVIRUS NL63 PCR:: NOT DETECTED
CORONAVIRUS OC43 PCR:: NOT DETECTED
CREAT BLD-MCNC: 0.77 MG/DL
DEPRECATED RDW RBC AUTO: 45.9 FL (ref 35.1–46.3)
EOSINOPHIL # BLD AUTO: 0.14 X10(3) UL (ref 0–0.7)
EOSINOPHIL NFR BLD AUTO: 1.7 %
ERYTHROCYTE [DISTWIDTH] IN BLOOD BY AUTOMATED COUNT: 14.2 % (ref 11–15)
FLUAV RNA SPEC QL NAA+PROBE: NOT DETECTED
FLUBV RNA SPEC QL NAA+PROBE: NOT DETECTED
GLUCOSE BLD-MCNC: 102 MG/DL (ref 70–99)
GLUCOSE BLDC GLUCOMTR-MCNC: 103 MG/DL (ref 70–99)
GLUCOSE BLDC GLUCOMTR-MCNC: 104 MG/DL (ref 70–99)
GLUCOSE BLDC GLUCOMTR-MCNC: 87 MG/DL (ref 70–99)
GLUCOSE BLDC GLUCOMTR-MCNC: 89 MG/DL (ref 70–99)
GLUCOSE BLDC GLUCOMTR-MCNC: 94 MG/DL (ref 70–99)
GLUCOSE BLDC GLUCOMTR-MCNC: 96 MG/DL (ref 70–99)
HCT VFR BLD AUTO: 37.7 %
HGB BLD-MCNC: 11.9 G/DL
IMM GRANULOCYTES # BLD AUTO: 0.03 X10(3) UL (ref 0–1)
IMM GRANULOCYTES NFR BLD: 0.4 %
LACTATE SERPL-SCNC: 2 MMOL/L (ref 0.4–2)
LYMPHOCYTES # BLD AUTO: 2.64 X10(3) UL (ref 1–4)
LYMPHOCYTES NFR BLD AUTO: 31.4 %
MCH RBC QN AUTO: 27.7 PG (ref 26–34)
MCHC RBC AUTO-ENTMCNC: 31.6 G/DL (ref 31–37)
MCV RBC AUTO: 87.9 FL
METAPNEUMOVIRUS PCR:: NOT DETECTED
MONOCYTES # BLD AUTO: 0.73 X10(3) UL (ref 0.1–1)
MONOCYTES NFR BLD AUTO: 8.7 %
MYCOPLASMA PNEUMONIA PCR:: NOT DETECTED
NEUTROPHILS # BLD AUTO: 4.79 X10 (3) UL (ref 1.5–7.7)
NEUTROPHILS # BLD AUTO: 4.79 X10(3) UL (ref 1.5–7.7)
NEUTROPHILS NFR BLD AUTO: 56.8 %
OSMOLALITY SERPL CALC.SUM OF ELEC: 287 MOSM/KG (ref 275–295)
PARAINFLUENZA 1 PCR:: NOT DETECTED
PARAINFLUENZA 2 PCR:: NOT DETECTED
PARAINFLUENZA 3 PCR:: NOT DETECTED
PARAINFLUENZA 4 PCR:: NOT DETECTED
PLATELET # BLD AUTO: 274 10(3)UL (ref 150–450)
POTASSIUM SERPL-SCNC: 3.6 MMOL/L (ref 3.5–5.1)
RBC # BLD AUTO: 4.29 X10(6)UL
RHINOVIRUS/ENTERO PCR:: NOT DETECTED
RSV RNA SPEC QL NAA+PROBE: NOT DETECTED
SARS-COV-2 RNA NPH QL NAA+NON-PROBE: NOT DETECTED
SODIUM SERPL-SCNC: 139 MMOL/L (ref 136–145)
WBC # BLD AUTO: 8.4 X10(3) UL (ref 4–11)

## 2022-08-02 PROCEDURE — 99233 SBSQ HOSP IP/OBS HIGH 50: CPT | Performed by: HOSPITALIST

## 2022-08-02 RX ORDER — QUETIAPINE FUMARATE 100 MG/1
100 TABLET, FILM COATED ORAL 2 TIMES DAILY
Status: DISCONTINUED | OUTPATIENT
Start: 2022-08-02 | End: 2022-08-03

## 2022-08-02 RX ORDER — DONEPEZIL HYDROCHLORIDE 10 MG/1
10 TABLET, FILM COATED ORAL NIGHTLY
Status: DISCONTINUED | OUTPATIENT
Start: 2022-08-02 | End: 2022-08-03

## 2022-08-02 RX ORDER — PANTOPRAZOLE SODIUM 40 MG/1
40 TABLET, DELAYED RELEASE ORAL
Status: DISCONTINUED | OUTPATIENT
Start: 2022-08-02 | End: 2022-08-03

## 2022-08-02 RX ORDER — SODIUM CHLORIDE 9 MG/ML
INJECTION, SOLUTION INTRAVENOUS CONTINUOUS
Status: DISCONTINUED | OUTPATIENT
Start: 2022-08-02 | End: 2022-08-03

## 2022-08-02 RX ORDER — TAMSULOSIN HYDROCHLORIDE 0.4 MG/1
0.4 CAPSULE ORAL DAILY
Status: DISCONTINUED | OUTPATIENT
Start: 2022-08-02 | End: 2022-08-03

## 2022-08-02 RX ORDER — MAGNESIUM HYDROXIDE/ALUMINUM HYDROXICE/SIMETHICONE 120; 1200; 1200 MG/30ML; MG/30ML; MG/30ML
30 SUSPENSION ORAL 4 TIMES DAILY PRN
Status: DISCONTINUED | OUTPATIENT
Start: 2022-08-02 | End: 2022-08-03

## 2022-08-02 RX ORDER — HYDRALAZINE HYDROCHLORIDE 20 MG/ML
10 INJECTION INTRAMUSCULAR; INTRAVENOUS EVERY 4 HOURS PRN
Status: DISCONTINUED | OUTPATIENT
Start: 2022-08-02 | End: 2022-08-03

## 2022-08-02 RX ORDER — ONDANSETRON 2 MG/ML
4 INJECTION INTRAMUSCULAR; INTRAVENOUS EVERY 6 HOURS PRN
Status: DISCONTINUED | OUTPATIENT
Start: 2022-08-02 | End: 2022-08-03

## 2022-08-02 RX ORDER — ACETAMINOPHEN 325 MG/1
650 TABLET ORAL EVERY 6 HOURS PRN
Status: DISCONTINUED | OUTPATIENT
Start: 2022-08-02 | End: 2022-08-03

## 2022-08-02 RX ORDER — HEPARIN SODIUM 5000 [USP'U]/ML
5000 INJECTION, SOLUTION INTRAVENOUS; SUBCUTANEOUS EVERY 12 HOURS
Status: DISCONTINUED | OUTPATIENT
Start: 2022-08-02 | End: 2022-08-03

## 2022-08-02 NOTE — PLAN OF CARE
Problem: Patient Centered Care  Goal: Patient preferences are identified and integrated in the patient's plan of care  Description: Interventions:  - What would you like us to know as we care for you?  Primary language is Armenian  - Provide timely, complete, and accurate information to patient/family  - Incorporate patient and family knowledge, values, beliefs, and cultural backgrounds into the planning and delivery of care  - Encourage patient/family to participate in care and decision-making at the level they choose  - Honor patient and family perspectives and choices  Outcome: Progressing     Problem: METABOLIC/FLUID AND ELECTROLYTES - ADULT  Goal: Glucose maintained within prescribed range  Description: INTERVENTIONS:  - Monitor Blood Glucose as ordered  - Assess for signs and symptoms of hyperglycemia and hypoglycemia  - Administer ordered medications to maintain glucose within target range  - Assess barriers to adequate nutritional intake and initiate nutrition consult as needed  - Instruct patient on self management of diabetes  Outcome: Progressing  Goal: Electrolytes maintained within normal limits  Description: INTERVENTIONS:  - Monitor labs and rhythm and assess patient for signs and symptoms of electrolyte imbalances  - Administer electrolyte replacement as ordered  - Monitor response to electrolyte replacements, including rhythm and repeat lab results as appropriate  - Fluid restriction as ordered  - Instruct patient on fluid and nutrition restrictions as appropriate  Outcome: Progressing  Goal: Hemodynamic stability and optimal renal function maintained  Description: INTERVENTIONS:  - Monitor labs and assess for signs and symptoms of volume excess or deficit  - Monitor intake, output and patient weight  - Monitor urine specific gravity, serum osmolarity and serum sodium as indicated or ordered  - Monitor response to interventions for patient's volume status, including labs, urine output, blood pressure (other measures as available)  - Encourage oral intake as appropriate  - Instruct patient on fluid and nutrition restrictions as appropriate  Outcome: Progressing     Problem: SKIN/TISSUE INTEGRITY - ADULT  Goal: Skin integrity remains intact  Description: INTERVENTIONS  - Assess and document risk factors for pressure ulcer development  - Assess and document skin integrity  - Monitor for areas of redness and/or skin breakdown  - Initiate interventions, skin care algorithm/standards of care as needed  Outcome: Progressing     Problem: RISK FOR INFECTION - ADULT  Goal: Absence of fever/infection during anticipated neutropenic period  Description: INTERVENTIONS  - Monitor WBC  - Administer growth factors as ordered  - Implement neutropenic guidelines  Outcome: Progressing     Problem: SAFETY ADULT - FALL  Goal: Free from fall injury  Description: INTERVENTIONS:  - Assess pt frequently for physical needs  - Identify cognitive and physical deficits and behaviors that affect risk of falls.   - [place] fall precautions as indicated by assessment.  - Educate pt/family on patient safety including physical limitations  - Instruct pt to call for assistance with activity based on assessment  - Modify environment to reduce risk of injury  - Provide assistive devices as appropriate  - Consider OT/PT consult to assist with strengthening/mobility  - Encourage toileting schedule  Outcome: Progressing     Problem: Altered Communication/Language Barrier  Goal: Patient/Family is able to understand and participate in their care  Description: Interventions:  - Assess communication ability and preferred communication style  - Implement communication aides and strategies  - Use visual cues when possible  - Listen attentively, be patient, do not interrupt  - Minimize distractions  - Allow time for understanding and response  - Establish method for patient to ask for assistance (call light)  - Provide an  as needed  - Communicate barriers and strategies to overcome with those who interact with patient  Outcome: Progressing     Problem: Patient/Family Goals  Goal: Patient/Family Long Term Goal  Description: Patient's Long Term Goal: To be discharged    Interventions:  - Monitor vital signs  - Administer medications per order  - Follow MD orders  - Diagnostics as needed  - Assist with ADLs  - Update / inform patient on plan of care  - Discharge planning  - See additional Care Plan goals for specific interventions  Outcome: Progressing  Goal: Patient/Family Short Term Goal  Description: Patient's Short Term Goal: To stay afebrile    Interventions:   - Monitor vital signs  - Administer medications per order  - Follow MD orders  - Diagnostics as needed  - Assist with ADLs  - Update / inform patient on plan of care  - See additional Care Plan goals for specific interventions  Outcome: Progressing     Patient oriented to unit. Relief RN attempted to do med rec over phone with daughter, but daughter declined at this time and is requesting to do later in the morning. Fall precautions in place.

## 2022-08-02 NOTE — TELEPHONE ENCOUNTER
I called pt's dtr and the VM was full and I could not LM. I called the son Alivia Hernandez and informed him of the full mailbox for dtr and asked if he could get a msg to her and have her call our office. I gave the #. I also told the son that pt's catheter should be changed before he leaves the hospital as he is still currently admitted and pt will need a post hospiatl follow up appt with [facility] and I told him that I will arrange this with the dtr when she calls.

## 2022-08-02 NOTE — ED INITIAL ASSESSMENT (HPI)
Patient is here with possible UTI. Patient has history of UTI. Around 8-9AM family noticed cloudiness in his urine. Patient has catheter. Denies fevers. Patient has history of dementia. AX2 per norm. According to EMS, /96.

## 2022-08-02 NOTE — TELEPHONE ENCOUNTER
Patients daughter requesting a catheter change appointment. States patient was seen at ER and also has an infection so it has to be with the doctor not just nurse visit.  Please advise

## 2022-08-02 NOTE — PROGRESS NOTES
Attempted to call daughter and son on profile to follow up on PTA med rec couple times, no answer, unable to leave voicemail.

## 2022-08-02 NOTE — PLAN OF CARE
Patient confused, redirected. Vitals stable. Denies pain/discomfort. Tolerated scheduled medications. Alejo care provided. Sitting up in chair. Fall precautions in place, frequent rounding. Problem: Patient Centered Care  Goal: Patient preferences are identified and integrated in the patient's plan of care  Description: Interventions:  - What would you like us to know as we care for you?  Primary language is Greenlandic  - Provide timely, complete, and accurate information to patient/family  - Incorporate patient and family knowledge, values, beliefs, and cultural backgrounds into the planning and delivery of care  - Encourage patient/family to participate in care and decision-making at the level they choose  - Honor patient and family perspectives and choices  Outcome: Progressing     Problem: METABOLIC/FLUID AND ELECTROLYTES - ADULT  Goal: Glucose maintained within prescribed range  Description: INTERVENTIONS:  - Monitor Blood Glucose as ordered  - Assess for signs and symptoms of hyperglycemia and hypoglycemia  - Administer ordered medications to maintain glucose within target range  - Assess barriers to adequate nutritional intake and initiate nutrition consult as needed  - Instruct patient on self management of diabetes  Outcome: Progressing  Goal: Electrolytes maintained within normal limits  Description: INTERVENTIONS:  - Monitor labs and rhythm and assess patient for signs and symptoms of electrolyte imbalances  - Administer electrolyte replacement as ordered  - Monitor response to electrolyte replacements, including rhythm and repeat lab results as appropriate  - Fluid restriction as ordered  - Instruct patient on fluid and nutrition restrictions as appropriate  Outcome: Progressing  Goal: Hemodynamic stability and optimal renal function maintained  Description: INTERVENTIONS:  - Monitor labs and assess for signs and symptoms of volume excess or deficit  - Monitor intake, output and patient weight  - Monitor urine specific gravity, serum osmolarity and serum sodium as indicated or ordered  - Monitor response to interventions for patient's volume status, including labs, urine output, blood pressure (other measures as available)  - Encourage oral intake as appropriate  - Instruct patient on fluid and nutrition restrictions as appropriate  Outcome: Progressing     Problem: SKIN/TISSUE INTEGRITY - ADULT  Goal: Skin integrity remains intact  Description: INTERVENTIONS  - Assess and document risk factors for pressure ulcer development  - Assess and document skin integrity  - Monitor for areas of redness and/or skin breakdown  - Initiate interventions, skin care algorithm/standards of care as needed  Outcome: Progressing     Problem: RISK FOR INFECTION - ADULT  Goal: Absence of fever/infection during anticipated neutropenic period  Description: INTERVENTIONS  - Monitor WBC  - Administer growth factors as ordered  - Implement neutropenic guidelines  Outcome: Progressing     Problem: SAFETY ADULT - FALL  Goal: Free from fall injury  Description: INTERVENTIONS:  - Assess pt frequently for physical needs  - Identify cognitive and physical deficits and behaviors that affect risk of falls.   - [place] fall precautions as indicated by assessment.  - Educate pt/family on patient safety including physical limitations  - Instruct pt to call for assistance with activity based on assessment  - Modify environment to reduce risk of injury  - Provide assistive devices as appropriate  - Consider OT/PT consult to assist with strengthening/mobility  - Encourage toileting schedule  Outcome: Progressing     Problem: Altered Communication/Language Barrier  Goal: Patient/Family is able to understand and participate in their care  Description: Interventions:  - Assess communication ability and preferred communication style  - Implement communication aides and strategies  - Use visual cues when possible  - Listen attentively, be patient, do not interrupt  - Minimize distractions  - Allow time for understanding and response  - Establish method for patient to ask for assistance (call light)  - Provide an  as needed  - Communicate barriers and strategies to overcome with those who interact with patient  Outcome: Progressing     Problem: Patient/Family Goals  Goal: Patient/Family Long Term Goal  Description: Patient's Long Term Goal: To be discharged    Interventions:  - Monitor vital signs  - Administer medications per order  - Follow MD orders  - Diagnostics as needed  - Assist with ADLs  - Update / inform patient on plan of care  - Discharge planning  - See additional Care Plan goals for specific interventions  Outcome: Progressing  Goal: Patient/Family Short Term Goal  Description: Patient's Short Term Goal: To stay afebrile    Interventions:   - Monitor vital signs  - Administer medications per order  - Follow MD orders  - Diagnostics as needed  - Assist with ADLs  - Update / inform patient on plan of care  - See additional Care Plan goals for specific interventions  Outcome: Progressing

## 2022-08-02 NOTE — PHYSICAL THERAPY NOTE
PT order received and chart was reviewed. RN cleared pt to participate with PT. Pt was seen sleeping in recliner chair. When awoken, pt declined participation with PT and requested to continue resting at this time. Will follow up tomorrow as appropriate and able. RN was made aware.

## 2022-08-02 NOTE — CM/SW NOTE
08/02/22 1200   CM/SW Screening   Referral [id number] staff; Chart review;Nursing rounds   Patient's Current Mental Status at Time of Assessment Confused or unable to complete assessment   Patient's [address]   Number of Levels in Home 2   Patient lives with Son  (Son Irene, [place] and grandkiregi)   Patient Status Prior to Admission   Independent with ADLs and Mobility No   Pt. requires assistance with Housework;Driving;Meals; Bathing; Ambulating;Dressing;Medications; Feeding; Toileting   Services in place prior to admission [address]  (dtr Meremaki Phillips is pd caregiver)   CM called dtr Renay for dc planning. Dtr confirmed address and above info. PT rec is MANJIT, family declined. Plan is to return home when medically cleared. CM inquired as to the last dedy cath change. Per dtr it was changed in ED on day of admission. Prior to that it was not changed since his last dc on 6/2022. Per dtr she was told by her PCP that Dr Michela Wilson should change eddy. Dtr reports that pt missed 1 appt and when she called to reschedule they were offered October appt. CM advised dtr that the catheter exchange should be a standing monthly appt with Dr Michela Wilson and she should call today to secure an appointment for the first week in Sept. Dtr demetrio. CM sent secure chat to MD/RN re above. Per chart review pt has a hx with [facility] for home IV abx in 6/2022. Blood cx pending at this time. Plan  Home with family as cg pending abx plan. / to remain available for support and/or discharge planning.      Kyle Gonzalez RN    Ext [phone]

## 2022-08-03 VITALS
BODY MASS INDEX: 27.16 KG/M2 | SYSTOLIC BLOOD PRESSURE: 151 MMHG | RESPIRATION RATE: 18 BRPM | WEIGHT: 169 LBS | TEMPERATURE: 98 F | HEIGHT: 66 IN | DIASTOLIC BLOOD PRESSURE: 73 MMHG | OXYGEN SATURATION: 98 % | HEART RATE: 68 BPM

## 2022-08-03 LAB
ANION GAP SERPL CALC-SCNC: 7 MMOL/L (ref 0–18)
BASOPHILS # BLD AUTO: 0.09 X10(3) UL (ref 0–0.2)
BASOPHILS NFR BLD AUTO: 1.4 %
BUN BLD-MCNC: 10 MG/DL (ref 7–18)
BUN/CREAT SERPL: 13 (ref 10–20)
CALCIUM BLD-MCNC: 8.5 MG/DL (ref 8.5–10.1)
CHLORIDE SERPL-SCNC: 113 MMOL/L (ref 98–112)
CO2 SERPL-SCNC: 23 MMOL/L (ref 21–32)
CREAT BLD-MCNC: 0.77 MG/DL
DEPRECATED RDW RBC AUTO: 46.3 FL (ref 35.1–46.3)
EOSINOPHIL # BLD AUTO: 0.2 X10(3) UL (ref 0–0.7)
EOSINOPHIL NFR BLD AUTO: 3.1 %
ERYTHROCYTE [DISTWIDTH] IN BLOOD BY AUTOMATED COUNT: 14.2 % (ref 11–15)
GFR SERPLBLD BASED ON 1.73 SQ M-ARVRAT: 93 ML/MIN/1.73M2 (ref 60–?)
GLUCOSE BLD-MCNC: 89 MG/DL (ref 70–99)
GLUCOSE BLDC GLUCOMTR-MCNC: 91 MG/DL (ref 70–99)
GLUCOSE BLDC GLUCOMTR-MCNC: 95 MG/DL (ref 70–99)
HCT VFR BLD AUTO: 36.7 %
HGB BLD-MCNC: 11.5 G/DL
IMM GRANULOCYTES # BLD AUTO: 0.01 X10(3) UL (ref 0–1)
IMM GRANULOCYTES NFR BLD: 0.2 %
LYMPHOCYTES # BLD AUTO: 2.86 X10(3) UL (ref 1–4)
LYMPHOCYTES NFR BLD AUTO: 44.6 %
MAGNESIUM SERPL-MCNC: 2 MG/DL (ref 1.6–2.6)
MCH RBC QN AUTO: 27.6 PG (ref 26–34)
MCHC RBC AUTO-ENTMCNC: 31.3 G/DL (ref 31–37)
MCV RBC AUTO: 88.2 FL
MONOCYTES # BLD AUTO: 0.57 X10(3) UL (ref 0.1–1)
MONOCYTES NFR BLD AUTO: 8.9 %
NEUTROPHILS # BLD AUTO: 2.68 X10 (3) UL (ref 1.5–7.7)
NEUTROPHILS # BLD AUTO: 2.68 X10(3) UL (ref 1.5–7.7)
NEUTROPHILS NFR BLD AUTO: 41.8 %
OSMOLALITY SERPL CALC.SUM OF ELEC: 295 MOSM/KG (ref 275–295)
PLATELET # BLD AUTO: 258 10(3)UL (ref 150–450)
POTASSIUM SERPL-SCNC: 3.7 MMOL/L (ref 3.5–5.1)
RBC # BLD AUTO: 4.16 X10(6)UL
SODIUM SERPL-SCNC: 143 MMOL/L (ref 136–145)
WBC # BLD AUTO: 6.4 X10(3) UL (ref 4–11)

## 2022-08-03 PROCEDURE — 99239 HOSP IP/OBS DSCHRG MGMT >30: CPT | Performed by: HOSPITALIST

## 2022-08-03 RX ORDER — LISINOPRIL 10 MG/1
10 TABLET ORAL DAILY
Status: DISCONTINUED | OUTPATIENT
Start: 2022-08-03 | End: 2022-08-03

## 2022-08-03 RX ORDER — PANTOPRAZOLE SODIUM 40 MG/1
40 TABLET, DELAYED RELEASE ORAL
Qty: 30 TABLET | Refills: 0 | Status: SHIPPED | OUTPATIENT
Start: 2022-08-04

## 2022-08-03 NOTE — PLAN OF CARE
Problem: Patient Centered Care  Goal: Patient preferences are identified and integrated in the patient's plan of care  Description: Interventions:  - What would you like us to know as we care for you?  Primary language is Japanese  - Provide timely, complete, and accurate information to patient/family  - Incorporate patient and family knowledge, values, beliefs, and cultural backgrounds into the planning and delivery of care  - Encourage patient/family to participate in care and decision-making at the level they choose  - Honor patient and family perspectives and choices  Outcome: Adequate for Discharge     Problem: METABOLIC/FLUID AND ELECTROLYTES - ADULT  Goal: Glucose maintained within prescribed range  Description: INTERVENTIONS:  - Monitor Blood Glucose as ordered  - Assess for signs and symptoms of hyperglycemia and hypoglycemia  - Administer ordered medications to maintain glucose within target range  - Assess barriers to adequate nutritional intake and initiate nutrition consult as needed  - Instruct patient on self management of diabetes  Outcome: Adequate for Discharge  Goal: Electrolytes maintained within normal limits  Description: INTERVENTIONS:  - Monitor labs and rhythm and assess patient for signs and symptoms of electrolyte imbalances  - Administer electrolyte replacement as ordered  - Monitor response to electrolyte replacements, including rhythm and repeat lab results as appropriate  - Fluid restriction as ordered  - Instruct patient on fluid and nutrition restrictions as appropriate  Outcome: Adequate for Discharge  Goal: Hemodynamic stability and optimal renal function maintained  Description: INTERVENTIONS:  - Monitor labs and assess for signs and symptoms of volume excess or deficit  - Monitor intake, output and patient weight  - Monitor urine specific gravity, serum osmolarity and serum sodium as indicated or ordered  - Monitor response to interventions for patient's volume status, including labs, urine output, blood pressure (other measures as available)  - Encourage oral intake as appropriate  - Instruct patient on fluid and nutrition restrictions as appropriate  Outcome: Adequate for Discharge     Problem: SKIN/TISSUE INTEGRITY - ADULT  Goal: Skin integrity remains intact  Description: INTERVENTIONS  - Assess and document risk factors for pressure ulcer development  - Assess and document skin integrity  - Monitor for areas of redness and/or skin breakdown  - Initiate interventions, skin care algorithm/standards of care as needed  Outcome: Adequate for Discharge     Problem: RISK FOR INFECTION - ADULT  Goal: Absence of fever/infection during anticipated neutropenic period  Description: INTERVENTIONS  - Monitor WBC  - Administer growth factors as ordered  - Implement neutropenic guidelines  Outcome: Adequate for Discharge     Problem: SAFETY ADULT - FALL  Goal: Free from fall injury  Description: INTERVENTIONS:  - Assess pt frequently for physical needs  - Identify cognitive and physical deficits and behaviors that affect risk of falls.   - [place] fall precautions as indicated by assessment.  - Educate pt/family on patient safety including physical limitations  - Instruct pt to call for assistance with activity based on assessment  - Modify environment to reduce risk of injury  - Provide assistive devices as appropriate  - Consider OT/PT consult to assist with strengthening/mobility  - Encourage toileting schedule  Outcome: Adequate for Discharge     Problem: Altered Communication/Language Barrier  Goal: Patient/Family is able to understand and participate in their care  Description: Interventions:  - Assess communication ability and preferred communication style  - Implement communication aides and strategies  - Use visual cues when possible  - Listen attentively, be patient, do not interrupt  - Minimize distractions  - Allow time for understanding and response  - Establish method for patient to ask for assistance (call light)  - Provide an  as needed  - Communicate barriers and strategies to overcome with those who interact with patient  Outcome: Adequate for Discharge     Problem: Patient/Family Goals  Goal: Patient/Family Long Term Goal  Description: Patient's Long Term Goal: To be discharged    Interventions:  - Monitor vital signs  - Administer medications per order  - Follow MD orders  - Diagnostics as needed  - Assist with ADLs  - Update / inform patient on plan of care  - Discharge planning  - See additional Care Plan goals for specific interventions  Outcome: Adequate for Discharge  Goal: Patient/Family Short Term Goal  Description: Patient's Short Term Goal: To stay afebrile    Interventions:   - Monitor vital signs  - Administer medications per order  - Follow MD orders  - Diagnostics as needed  - Assist with ADLs  - Update / inform patient on plan of care  - See additional Care Plan goals for specific interventions  Outcome: Adequate for Discharge

## 2022-08-03 NOTE — PROGRESS NOTES
Discharge instructions given to patient's daughter via telephone, verbalized understanding of instructions including follow up appointments, script for pantoprazole sent to patients pharmacy per AVS. Per AVS appointment for eddy catheter exchange is scheduled for 9/1 at 10: 27 am daughter aware to call and confirm appointment. Iv discontinued. Ambulance at bedside to transport patient home.

## 2022-08-03 NOTE — PHYSICAL THERAPY NOTE
Chart reviewed for PT sergey. Attempted to see patient yesterday but he declined but he was seen by OT yesterday who spoke with family about plf and recommended home with 24 hr care. Family reports they are taking him home no matter the PT recommendation. Spoke with RN Robert who reports he is scheduled for dc today and plan is for [facility], has been up with RN staff as well to bathroom and chair. No PT evaluation performed as he will be going home with family and HH.

## 2022-08-03 NOTE — CM/SW NOTE
08/03/22 1006   Discharge disposition   Post Acute Care Provider Home   Discharge transportation [facility]   MDO for dc. CM placed amb on will call and completed pcs. \RN to coordinate dc time with family and call [facility] at [phone] to secure transport time. / to remain available for support and/or discharge planning.      Orquidea Person RN    Ext [phone]

## 2022-08-03 NOTE — PLAN OF CARE
No Falls, bed low locked call light within reach. Pt a-febrile. Vitals stable. Urine output within normal limits. Problem: Patient Centered Care  Goal: Patient preferences are identified and integrated in the patient's plan of care  Description: Interventions:  - What would you like us to know as we care for you?  Primary language is Latvian  - Provide timely, complete, and accurate information to patient/family  - Incorporate patient and family knowledge, values, beliefs, and cultural backgrounds into the planning and delivery of care  - Encourage patient/family to participate in care and decision-making at the level they choose  - Honor patient and family perspectives and choices  Outcome: Progressing     Problem: METABOLIC/FLUID AND ELECTROLYTES - ADULT  Goal: Glucose maintained within prescribed range  Description: INTERVENTIONS:  - Monitor Blood Glucose as ordered  - Assess for signs and symptoms of hyperglycemia and hypoglycemia  - Administer ordered medications to maintain glucose within target range  - Assess barriers to adequate nutritional intake and initiate nutrition consult as needed  - Instruct patient on self management of diabetes  Outcome: Progressing  Goal: Electrolytes maintained within normal limits  Description: INTERVENTIONS:  - Monitor labs and rhythm and assess patient for signs and symptoms of electrolyte imbalances  - Administer electrolyte replacement as ordered  - Monitor response to electrolyte replacements, including rhythm and repeat lab results as appropriate  - Fluid restriction as ordered  - Instruct patient on fluid and nutrition restrictions as appropriate  Outcome: Progressing  Goal: Hemodynamic stability and optimal renal function maintained  Description: INTERVENTIONS:  - Monitor labs and assess for signs and symptoms of volume excess or deficit  - Monitor intake, output and patient weight  - Monitor urine specific gravity, serum osmolarity and serum sodium as indicated or ordered  - Monitor response to interventions for patient's volume status, including labs, urine output, blood pressure (other measures as available)  - Encourage oral intake as appropriate  - Instruct patient on fluid and nutrition restrictions as appropriate  Outcome: Progressing     Problem: SKIN/TISSUE INTEGRITY - ADULT  Goal: Skin integrity remains intact  Description: INTERVENTIONS  - Assess and document risk factors for pressure ulcer development  - Assess and document skin integrity  - Monitor for areas of redness and/or skin breakdown  - Initiate interventions, skin care algorithm/standards of care as needed  Outcome: Progressing     Problem: RISK FOR INFECTION - ADULT  Goal: Absence of fever/infection during anticipated neutropenic period  Description: INTERVENTIONS  - Monitor WBC  - Administer growth factors as ordered  - Implement neutropenic guidelines  Outcome: Progressing     Problem: SAFETY ADULT - FALL  Goal: Free from fall injury  Description: INTERVENTIONS:  - Assess pt frequently for physical needs  - Identify cognitive and physical deficits and behaviors that affect risk of falls.   - [place] fall precautions as indicated by assessment.  - Educate pt/family on patient safety including physical limitations  - Instruct pt to call for assistance with activity based on assessment  - Modify environment to reduce risk of injury  - Provide assistive devices as appropriate  - Consider OT/PT consult to assist with strengthening/mobility  - Encourage toileting schedule  Outcome: Progressing     Problem: Altered Communication/Language Barrier  Goal: Patient/Family is able to understand and participate in their care  Description: Interventions:  - Assess communication ability and preferred communication style  - Implement communication aides and strategies  - Use visual cues when possible  - Listen attentively, be patient, do not interrupt  - Minimize distractions  - Allow time for understanding and response  - Establish method for patient to ask for assistance (call light)  - Provide an  as needed  - Communicate barriers and strategies to overcome with those who interact with patient  Outcome: Progressing     Problem: Patient/Family Goals  Goal: Patient/Family Long Term Goal  Description: Patient's Long Term Goal: To be discharged    Interventions:  - Monitor vital signs  - Administer medications per order  - Follow MD orders  - Diagnostics as needed  - Assist with ADLs  - Update / inform patient on plan of care  - Discharge planning  - See additional Care Plan goals for specific interventions  Outcome: Progressing  Goal: Patient/Family Short Term Goal  Description: Patient's Short Term Goal: To stay afebrile    Interventions:   - Monitor vital signs  - Administer medications per order  - Follow MD orders  - Diagnostics as needed  - Assist with ADLs  - Update / inform patient on plan of care  - See additional Care Plan goals for specific interventions  Outcome: Progressing

## 2022-08-04 ENCOUNTER — PATIENT OUTREACH (OUTPATIENT)
Dept: CASE MANAGEMENT | Age: 77
End: 2022-08-04

## 2022-08-04 NOTE — PROGRESS NOTES
Called daughter--voicemail full--unable to leave message on mailbox  to call NC back for TCM. NCM will try again another time.

## 2022-08-04 NOTE — PROGRESS NOTES
Daughter's voicemail still full--unable to leave message to call NCM back for HFU--not MyChart active.

## 2022-08-25 ENCOUNTER — HOSPITAL ENCOUNTER (EMERGENCY)
Facility: HOSPITAL | Age: 77
Discharge: HOME | End: 2022-08-25
Attending: EMERGENCY MEDICINE
Payer: MEDICAID

## 2022-08-25 VITALS
TEMPERATURE: 98 F | SYSTOLIC BLOOD PRESSURE: 130 MMHG | DIASTOLIC BLOOD PRESSURE: 68 MMHG | HEART RATE: 63 BPM | OXYGEN SATURATION: 99 % | RESPIRATION RATE: 18 BRPM

## 2022-08-25 DIAGNOSIS — R31.9 URINARY TRACT INFECTION WITH HEMATURIA, SITE UNSPECIFIED: ICD-10-CM

## 2022-08-25 DIAGNOSIS — N39.0 URINARY TRACT INFECTION WITH HEMATURIA, SITE UNSPECIFIED: ICD-10-CM

## 2022-08-25 DIAGNOSIS — T83.9XXA PROBLEM WITH FOLEY CATHETER, INITIAL ENCOUNTER (HCC): Primary | ICD-10-CM

## 2022-08-25 LAB
BILIRUB UR QL: NEGATIVE
COLOR UR: YELLOW
GLUCOSE UR-MCNC: NEGATIVE MG/DL
KETONES UR-MCNC: NEGATIVE MG/DL
NITRITE UR QL STRIP.AUTO: POSITIVE
PH UR: 8.5 [PH] (ref 5–8)
RBC #/AREA URNS AUTO: >10 /HPF
RBC #/AREA URNS AUTO: >10 /HPF
SP GR UR STRIP: 1.01 (ref 1–1.03)
UROBILINOGEN UR STRIP-ACNC: 0.2
WBC #/AREA URNS AUTO: >50 /HPF
WBC #/AREA URNS AUTO: >50 /HPF
WBC CLUMPS UR QL AUTO: PRESENT /HPF
WBC CLUMPS UR QL AUTO: PRESENT /HPF

## 2022-08-25 PROCEDURE — 87186 SC STD MICRODIL/AGAR DIL: CPT | Performed by: EMERGENCY MEDICINE

## 2022-08-25 PROCEDURE — 81015 MICROSCOPIC EXAM OF URINE: CPT | Performed by: EMERGENCY MEDICINE

## 2022-08-25 PROCEDURE — 99285 EMERGENCY DEPT VISIT HI MDM: CPT

## 2022-08-25 PROCEDURE — 87086 URINE CULTURE/COLONY COUNT: CPT | Performed by: EMERGENCY MEDICINE

## 2022-08-25 PROCEDURE — 87077 CULTURE AEROBIC IDENTIFY: CPT | Performed by: EMERGENCY MEDICINE

## 2022-08-25 PROCEDURE — 81001 URINALYSIS AUTO W/SCOPE: CPT | Performed by: EMERGENCY MEDICINE

## 2022-08-25 RX ORDER — CEPHALEXIN 500 MG/1
500 CAPSULE ORAL 3 TIMES DAILY
Qty: 21 CAPSULE | Refills: 0 | Status: SHIPPED | OUTPATIENT
Start: 2022-08-25 | End: 2022-09-01

## 2022-08-25 NOTE — ED INITIAL ASSESSMENT (HPI)
Pt presents to ED via EMS who sts pt has had a leaking indwelling urinary cath x 1 day. Pt has also had constipation x 5 days. Pt is in bed with gown on, bed in the lowest position, call light within reach.

## 2022-08-27 ENCOUNTER — HOSPITAL ENCOUNTER (EMERGENCY)
Facility: HOSPITAL | Age: 77
Discharge: HOME | End: 2022-08-28
Attending: EMERGENCY MEDICINE
Payer: MEDICAID

## 2022-08-27 DIAGNOSIS — N39.0 URINARY TRACT INFECTION ASSOCIATED WITH INDWELLING URETHRAL CATHETER, SUBSEQUENT ENCOUNTER: Primary | ICD-10-CM

## 2022-08-27 DIAGNOSIS — T83.511D URINARY TRACT INFECTION ASSOCIATED WITH INDWELLING URETHRAL CATHETER, SUBSEQUENT ENCOUNTER: Primary | ICD-10-CM

## 2022-08-27 LAB
BILIRUB UR QL CFM: NEGATIVE
CLARITY UR: CLEAR
COLOR UR: YELLOW
GLUCOSE UR-MCNC: NEGATIVE MG/DL
KETONES UR-MCNC: NEGATIVE MG/DL
NITRITE UR QL STRIP.AUTO: POSITIVE
PH UR: >=9 [PH] (ref 5–8)
RBC #/AREA URNS AUTO: >10 /HPF
RBC #/AREA URNS AUTO: >10 /HPF
SP GR UR STRIP: 1.01 (ref 1–1.03)
UROBILINOGEN UR STRIP-ACNC: 0.2

## 2022-08-27 PROCEDURE — 81015 MICROSCOPIC EXAM OF URINE: CPT | Performed by: EMERGENCY MEDICINE

## 2022-08-27 PROCEDURE — 81001 URINALYSIS AUTO W/SCOPE: CPT | Performed by: EMERGENCY MEDICINE

## 2022-08-27 PROCEDURE — 99283 EMERGENCY DEPT VISIT LOW MDM: CPT

## 2022-08-27 PROCEDURE — 99285 EMERGENCY DEPT VISIT HI MDM: CPT

## 2022-08-27 PROCEDURE — 51702 INSERT TEMP BLADDER CATH: CPT

## 2022-08-28 VITALS
SYSTOLIC BLOOD PRESSURE: 147 MMHG | RESPIRATION RATE: 16 BRPM | BODY MASS INDEX: 24.55 KG/M2 | TEMPERATURE: 99 F | DIASTOLIC BLOOD PRESSURE: 90 MMHG | WEIGHT: 130 LBS | OXYGEN SATURATION: 97 % | HEIGHT: 61 IN | HEART RATE: 80 BPM

## 2022-08-28 NOTE — ED INITIAL ASSESSMENT (HPI)
Pt from home c/o indwelling catheter not draining since this morning. Concentrated urine, 150cc drained from bag in ED. Per EMS, family stated \"there is a growth in bladder\".  Chinese as primary language

## 2022-09-01 ENCOUNTER — TELEPHONE (OUTPATIENT)
Dept: SURGERY | Facility: CLINIC | Age: 77
End: 2022-09-01

## 2022-09-01 ENCOUNTER — HOSPITAL ENCOUNTER (OUTPATIENT)
Dept: GENERAL RADIOLOGY | Facility: HOSPITAL | Age: 77
Discharge: HOME | End: 2022-09-01
Attending: UROLOGY
Payer: MEDICAID

## 2022-09-01 ENCOUNTER — OFFICE VISIT (OUTPATIENT)
Dept: SURGERY | Facility: CLINIC | Age: 77
End: 2022-09-01
Payer: MEDICAID

## 2022-09-01 DIAGNOSIS — N40.1 BPH WITH OBSTRUCTION/LOWER URINARY TRACT SYMPTOMS: ICD-10-CM

## 2022-09-01 DIAGNOSIS — N20.0 KIDNEY STONES: ICD-10-CM

## 2022-09-01 DIAGNOSIS — N20.0 KIDNEY STONES: Primary | ICD-10-CM

## 2022-09-01 DIAGNOSIS — N13.8 BPH WITH OBSTRUCTION/LOWER URINARY TRACT SYMPTOMS: ICD-10-CM

## 2022-09-01 DIAGNOSIS — R97.20 ELEVATED PSA: ICD-10-CM

## 2022-09-01 PROCEDURE — 99213 OFFICE O/P EST LOW 20 MIN: CPT | Performed by: UROLOGY

## 2022-09-01 PROCEDURE — 74018 RADEX ABDOMEN 1 VIEW: CPT | Performed by: UROLOGY

## 2022-09-01 PROCEDURE — 1111F DSCHRG MED/CURRENT MED MERGE: CPT | Performed by: UROLOGY

## 2022-09-12 ENCOUNTER — HOSPITAL ENCOUNTER (EMERGENCY)
Facility: HOSPITAL | Age: 77
Discharge: HOME | End: 2022-09-12
Attending: EMERGENCY MEDICINE
Payer: MEDICAID

## 2022-09-12 VITALS
HEART RATE: 80 BPM | WEIGHT: 130.06 LBS | DIASTOLIC BLOOD PRESSURE: 80 MMHG | BODY MASS INDEX: 25 KG/M2 | SYSTOLIC BLOOD PRESSURE: 137 MMHG | OXYGEN SATURATION: 99 % | RESPIRATION RATE: 18 BRPM | TEMPERATURE: 100 F

## 2022-09-12 DIAGNOSIS — T83.011A MALFUNCTION OF FOLEY CATHETER, INITIAL ENCOUNTER (HCC): Primary | ICD-10-CM

## 2022-09-12 PROCEDURE — 99283 EMERGENCY DEPT VISIT LOW MDM: CPT

## 2022-09-12 NOTE — ED QUICK NOTES
Catheter is patent and bladder scan shows no retention. Alejo bag present upon arrival is leaking. Catheter was not changed, but new bag was attached.

## 2022-09-12 NOTE — ED INITIAL ASSESSMENT (HPI)
Pt from home via EMS with complaint of pain to catheter site and kidney stones. Per medics, patient has procedure for kidney stones on 9/20/22.

## 2022-09-20 ENCOUNTER — TELEPHONE (OUTPATIENT)
Dept: SURGERY | Facility: CLINIC | Age: 77
End: 2022-09-20

## 2022-09-20 NOTE — TELEPHONE ENCOUNTER
I called pt daughter, Michael Esparza informed her that we will need POA and paperwork with pt today at the 4:00pm office cystoscopy with possible bladder biopsies. Daughter states they do not have any POA/ paperwork. I informed daughter that legally for a legal consent for the procedure today if pt can not states his name/ and aware of having procedure we can NOT proceed until we have POA person here with legal paperwork. Daughter states she will see what she can do today and if anything fax over I informed daughter this is a process and can take awhile, daughter is aware we more likely are going to cancel procedure today.

## 2022-09-22 ENCOUNTER — MED REC SCAN ONLY (OUTPATIENT)
Dept: FAMILY MEDICINE CLINIC | Facility: CLINIC | Age: 77
End: 2022-09-22

## 2022-10-05 ENCOUNTER — TELEPHONE (OUTPATIENT)
Dept: SURGERY | Facility: CLINIC | Age: 77
End: 2022-10-05

## 2022-10-07 NOTE — TELEPHONE ENCOUNTER
S/W pt's dtr and asked if she was able to get the POA and she stated she did and I gave an appt for Friday 12/2 at 9:40 am for cysto. She asked that pt be placed on the wait list also. I did this.

## 2022-10-17 ENCOUNTER — OFFICE VISIT (OUTPATIENT)
Dept: FAMILY MEDICINE CLINIC | Facility: CLINIC | Age: 77
End: 2022-10-17
Payer: MEDICAID

## 2022-10-17 VITALS
HEART RATE: 77 BPM | DIASTOLIC BLOOD PRESSURE: 94 MMHG | WEIGHT: 162 LBS | SYSTOLIC BLOOD PRESSURE: 141 MMHG | HEIGHT: 61 IN | TEMPERATURE: 98 F | BODY MASS INDEX: 30.58 KG/M2

## 2022-10-17 DIAGNOSIS — I10 ESSENTIAL HYPERTENSION: ICD-10-CM

## 2022-10-17 DIAGNOSIS — Z86.19 HISTORY OF ESBL E. COLI INFECTION: ICD-10-CM

## 2022-10-17 DIAGNOSIS — Z97.8 FOLEY CATHETER IN PLACE: ICD-10-CM

## 2022-10-17 DIAGNOSIS — Q76.6 ABNORMAL PROMINENCE OF RIB: Primary | ICD-10-CM

## 2022-10-17 PROCEDURE — 3077F SYST BP >= 140 MM HG: CPT | Performed by: FAMILY MEDICINE

## 2022-10-17 PROCEDURE — 99214 OFFICE O/P EST MOD 30 MIN: CPT | Performed by: FAMILY MEDICINE

## 2022-10-17 PROCEDURE — 3080F DIAST BP >= 90 MM HG: CPT | Performed by: FAMILY MEDICINE

## 2022-10-17 PROCEDURE — 3008F BODY MASS INDEX DOCD: CPT | Performed by: FAMILY MEDICINE

## 2022-10-17 RX ORDER — LISINOPRIL 10 MG/1
10 TABLET ORAL DAILY
Qty: 90 TABLET | Refills: 1 | Status: SHIPPED | OUTPATIENT
Start: 2022-10-17

## 2022-10-20 ENCOUNTER — OFFICE VISIT (OUTPATIENT)
Dept: GASTROENTEROLOGY | Facility: CLINIC | Age: 77
End: 2022-10-20
Payer: MEDICAID

## 2022-10-20 ENCOUNTER — HOSPITAL ENCOUNTER (OUTPATIENT)
Dept: GENERAL RADIOLOGY | Age: 77
Discharge: HOME | End: 2022-10-20
Attending: FAMILY MEDICINE
Payer: MEDICAID

## 2022-10-20 VITALS
WEIGHT: 163 LBS | DIASTOLIC BLOOD PRESSURE: 74 MMHG | BODY MASS INDEX: 30.78 KG/M2 | HEIGHT: 61 IN | SYSTOLIC BLOOD PRESSURE: 124 MMHG

## 2022-10-20 DIAGNOSIS — Q76.6 ABNORMAL PROMINENCE OF RIB: ICD-10-CM

## 2022-10-20 DIAGNOSIS — K76.9 LIVER LESION: Primary | ICD-10-CM

## 2022-10-20 PROCEDURE — 3074F SYST BP LT 130 MM HG: CPT | Performed by: INTERNAL MEDICINE

## 2022-10-20 PROCEDURE — 3078F DIAST BP <80 MM HG: CPT | Performed by: INTERNAL MEDICINE

## 2022-10-20 PROCEDURE — 99203 OFFICE O/P NEW LOW 30 MIN: CPT | Performed by: INTERNAL MEDICINE

## 2022-10-20 PROCEDURE — 3008F BODY MASS INDEX DOCD: CPT | Performed by: INTERNAL MEDICINE

## 2022-10-20 PROCEDURE — 71100 X-RAY EXAM RIBS UNI 2 VIEWS: CPT | Performed by: FAMILY MEDICINE

## 2022-11-11 ENCOUNTER — TELEPHONE (OUTPATIENT)
Dept: SURGERY | Facility: CLINIC | Age: 77
End: 2022-11-11

## 2022-11-11 NOTE — TELEPHONE ENCOUNTER
Phone room called asking if pt nurse visit 1:00pm lisha can be moved to 12:15pm today, all nurses are in with nurse visits, I told phone room to have pt keep lisha as scheduled at 1:00pm and remind POA needs to be at that lisha with pt today, phone room understands and will tell pt

## 2022-12-02 ENCOUNTER — TELEPHONE (OUTPATIENT)
Dept: SURGERY | Facility: CLINIC | Age: 77
End: 2022-12-02

## 2022-12-02 NOTE — TELEPHONE ENCOUNTER
-Pt was scheduled today (22) @ 9:40am for an office Cysto. -D/t mental status, pt needs to accompanied by POA. In previous appts, a POA had not been assigned, thus the signed/ notorized State of [place] form was not in place  -S/w Dtr/ Rooha this am; pt identity verified with name & .  - I called to confirm a Healthcare agent had been assigned. She states \"b/c I work, my Son/ Brianda Lebron is the [employer]. He is 20y/o. -When pt arrived, I reviewed the POA for Healthcare. The document was not notorized, & Cysto was cancelled.  -I asked if Oksana Lundberg understands the magnitude of his responsibility. 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 \"  -He wanted to reschedule the Cysto. Since this was the 2nd incident where the OV was cancelled d/t insufficient proof of a POA for healthcare, I instructed him after he brought a legally correct document, then I would secure an appt for the pt.   -He was given back the document presented today. He planned to print a clean POA form from the state website.  -Encounter complete. HORACE aware of above situation.

## 2022-12-09 ENCOUNTER — TELEPHONE (OUTPATIENT)
Dept: SURGERY | Facility: CLINIC | Age: 77
End: 2022-12-09

## 2022-12-19 ENCOUNTER — TELEPHONE (OUTPATIENT)
Dept: FAMILY MEDICINE CLINIC | Facility: CLINIC | Age: 77
End: 2022-12-19

## 2022-12-19 NOTE — TELEPHONE ENCOUNTER
Received order from [facility] for DME supplies adult size pull on, disposable underpad and vynil glvs order signed by Dr. Marisa Maya and faxed back successful.

## 2023-01-24 ENCOUNTER — TELEPHONE (OUTPATIENT)
Dept: SURGERY | Facility: CLINIC | Age: 78
End: 2023-01-24

## 2023-01-25 NOTE — TELEPHONE ENCOUNTER
Left Patient's Daughter a voice asking her to give our office a call back for assistance with scheduling appointment

## 2023-01-25 NOTE — TELEPHONE ENCOUNTER
Left voice mail for patient to give our office a call back for assistance with scheduling appointment

## 2023-01-27 ENCOUNTER — HOSPITAL ENCOUNTER (INPATIENT)
Facility: HOSPITAL | Age: 78
LOS: 3 days | Discharge: HOME | End: 2023-01-30
Attending: EMERGENCY MEDICINE | Admitting: HOSPITALIST
Payer: MEDICAID

## 2023-01-27 ENCOUNTER — APPOINTMENT (OUTPATIENT)
Dept: CT IMAGING | Facility: HOSPITAL | Age: 78
End: 2023-01-27
Attending: EMERGENCY MEDICINE
Payer: MEDICAID

## 2023-01-27 ENCOUNTER — HOSPITAL ENCOUNTER (INPATIENT)
Facility: HOSPITAL | Age: 78
LOS: 3 days | Discharge: HOME HEALTH | End: 2023-01-30
Attending: EMERGENCY MEDICINE | Admitting: HOSPITALIST
Payer: MEDICAID

## 2023-01-27 DIAGNOSIS — N39.0 URINARY TRACT INFECTION ASSOCIATED WITH INDWELLING URETHRAL CATHETER, INITIAL ENCOUNTER (HCC): Primary | ICD-10-CM

## 2023-01-27 DIAGNOSIS — T83.511A URINARY TRACT INFECTION ASSOCIATED WITH INDWELLING URETHRAL CATHETER, INITIAL ENCOUNTER (HCC): Primary | ICD-10-CM

## 2023-01-27 LAB
ALBUMIN SERPL-MCNC: 3.2 G/DL (ref 3.4–5)
ALBUMIN/GLOB SERPL: 0.6 {RATIO} (ref 1–2)
ALP LIVER SERPL-CCNC: 74 U/L
ALT SERPL-CCNC: 17 U/L
ANION GAP SERPL CALC-SCNC: 7 MMOL/L (ref 0–18)
AST SERPL-CCNC: 15 U/L (ref 15–37)
ATRIAL RATE: 105 BPM
BASOPHILS # BLD AUTO: 0.08 X10(3) UL (ref 0–0.2)
BASOPHILS NFR BLD AUTO: 0.6 %
BILIRUB SERPL-MCNC: 1.2 MG/DL (ref 0.1–2)
BILIRUB UR QL: NEGATIVE
BILIRUB UR QL: NEGATIVE
BUN BLD-MCNC: 19 MG/DL (ref 7–18)
BUN/CREAT SERPL: 13.8 (ref 10–20)
CALCIUM BLD-MCNC: 9.7 MG/DL (ref 8.5–10.1)
CHLORIDE SERPL-SCNC: 104 MMOL/L (ref 98–112)
CO2 SERPL-SCNC: 25 MMOL/L (ref 21–32)
COLOR UR: YELLOW
CREAT BLD-MCNC: 1.38 MG/DL
DEPRECATED RDW RBC AUTO: 44.3 FL (ref 35.1–46.3)
EOSINOPHIL # BLD AUTO: 0 X10(3) UL (ref 0–0.7)
EOSINOPHIL NFR BLD AUTO: 0 %
ERYTHROCYTE [DISTWIDTH] IN BLOOD BY AUTOMATED COUNT: 14.3 % (ref 11–15)
EST. AVERAGE GLUCOSE BLD GHB EST-MCNC: 114 MG/DL (ref 68–126)
GFR SERPLBLD BASED ON 1.73 SQ M-ARVRAT: 53 ML/MIN/1.73M2 (ref 60–?)
GLOBULIN PLAS-MCNC: 5.4 G/DL (ref 2.8–4.4)
GLUCOSE BLD-MCNC: 192 MG/DL (ref 70–99)
GLUCOSE BLDC GLUCOMTR-MCNC: 104 MG/DL (ref 70–99)
GLUCOSE BLDC GLUCOMTR-MCNC: 107 MG/DL (ref 70–99)
GLUCOSE UR-MCNC: NEGATIVE MG/DL
GLUCOSE UR-MCNC: NEGATIVE MG/DL
HBA1C MFR BLD: 5.6 % (ref ?–5.7)
HCT VFR BLD AUTO: 41.5 %
HGB BLD-MCNC: 13.8 G/DL
IMM GRANULOCYTES # BLD AUTO: 0.08 X10(3) UL (ref 0–1)
IMM GRANULOCYTES NFR BLD: 0.6 %
KETONES UR-MCNC: NEGATIVE MG/DL
KETONES UR-MCNC: NEGATIVE MG/DL
LACTATE SERPL-SCNC: 1.8 MMOL/L (ref 0.4–2)
LACTATE SERPL-SCNC: 3.4 MMOL/L (ref 0.4–2)
LYMPHOCYTES # BLD AUTO: 0.68 X10(3) UL (ref 1–4)
LYMPHOCYTES NFR BLD AUTO: 4.8 %
MCH RBC QN AUTO: 28.2 PG (ref 26–34)
MCHC RBC AUTO-ENTMCNC: 33.3 G/DL (ref 31–37)
MCV RBC AUTO: 84.9 FL
MONOCYTES # BLD AUTO: 0.95 X10(3) UL (ref 0.1–1)
MONOCYTES NFR BLD AUTO: 6.7 %
NEUTROPHILS # BLD AUTO: 12.34 X10 (3) UL (ref 1.5–7.7)
NEUTROPHILS # BLD AUTO: 12.34 X10(3) UL (ref 1.5–7.7)
NEUTROPHILS NFR BLD AUTO: 87.3 %
NITRITE UR QL STRIP.AUTO: POSITIVE
NITRITE UR QL STRIP.AUTO: POSITIVE
OSMOLALITY SERPL CALC.SUM OF ELEC: 289 MOSM/KG (ref 275–295)
P AXIS: 49 DEGREES
P-R INTERVAL: 158 MS
PH UR: 8.5 [PH] (ref 5–8)
PH UR: >=9 [PH] (ref 5–8)
PLATELET # BLD AUTO: 333 10(3)UL (ref 150–450)
POTASSIUM SERPL-SCNC: 3.7 MMOL/L (ref 3.5–5.1)
PROT SERPL-MCNC: 8.6 G/DL (ref 6.4–8.2)
PROT UR-MCNC: >=300 MG/DL
PROT UR-MCNC: >=300 MG/DL
Q-T INTERVAL: 328 MS
QRS DURATION: 70 MS
QTC CALCULATION (BEZET): 433 MS
R AXIS: 4 DEGREES
RBC # BLD AUTO: 4.89 X10(6)UL
RBC #/AREA URNS AUTO: >10 /HPF
SARS-COV-2 RNA RESP QL NAA+PROBE: NOT DETECTED
SODIUM SERPL-SCNC: 136 MMOL/L (ref 136–145)
SP GR UR STRIP: 1.01 (ref 1–1.03)
SP GR UR STRIP: 1.01 (ref 1–1.03)
T AXIS: 85 DEGREES
UROBILINOGEN UR STRIP-ACNC: 0.2
UROBILINOGEN UR STRIP-ACNC: 0.2
VENTRICULAR RATE: 105 BPM
WBC # BLD AUTO: 14.1 X10(3) UL (ref 4–11)
WBC #/AREA URNS AUTO: >50 /HPF
WBC CLUMPS UR QL AUTO: PRESENT /HPF

## 2023-01-27 PROCEDURE — 99223 1ST HOSP IP/OBS HIGH 75: CPT | Performed by: HOSPITALIST

## 2023-01-27 PROCEDURE — 74176 CT ABD & PELVIS W/O CONTRAST: CPT | Performed by: EMERGENCY MEDICINE

## 2023-01-27 RX ORDER — PANTOPRAZOLE SODIUM 40 MG/1
40 TABLET, DELAYED RELEASE ORAL
COMMUNITY

## 2023-01-27 RX ORDER — NICOTINE POLACRILEX 4 MG
15 LOZENGE BUCCAL
Status: DISCONTINUED | OUTPATIENT
Start: 2023-01-27 | End: 2023-01-30

## 2023-01-27 RX ORDER — DEXTROSE MONOHYDRATE 25 G/50ML
50 INJECTION, SOLUTION INTRAVENOUS
Status: DISCONTINUED | OUTPATIENT
Start: 2023-01-27 | End: 2023-01-30

## 2023-01-27 RX ORDER — SODIUM CHLORIDE 9 MG/ML
INJECTION, SOLUTION INTRAVENOUS CONTINUOUS
Status: DISCONTINUED | OUTPATIENT
Start: 2023-01-27 | End: 2023-01-29

## 2023-01-27 RX ORDER — ACETAMINOPHEN 500 MG
500 TABLET ORAL EVERY 4 HOURS PRN
Status: DISCONTINUED | OUTPATIENT
Start: 2023-01-27 | End: 2023-01-30

## 2023-01-27 RX ORDER — HEPARIN SODIUM 5000 [USP'U]/ML
5000 INJECTION, SOLUTION INTRAVENOUS; SUBCUTANEOUS EVERY 12 HOURS SCHEDULED
Status: DISCONTINUED | OUTPATIENT
Start: 2023-01-27 | End: 2023-01-30

## 2023-01-27 RX ORDER — ONDANSETRON 2 MG/ML
4 INJECTION INTRAMUSCULAR; INTRAVENOUS EVERY 6 HOURS PRN
Status: DISCONTINUED | OUTPATIENT
Start: 2023-01-27 | End: 2023-01-30

## 2023-01-27 RX ORDER — NICOTINE POLACRILEX 4 MG
30 LOZENGE BUCCAL
Status: DISCONTINUED | OUTPATIENT
Start: 2023-01-27 | End: 2023-01-30

## 2023-01-27 RX ORDER — PANTOPRAZOLE SODIUM 40 MG/1
40 TABLET, DELAYED RELEASE ORAL
Status: DISCONTINUED | OUTPATIENT
Start: 2023-01-28 | End: 2023-01-30

## 2023-01-27 RX ORDER — TAMSULOSIN HYDROCHLORIDE 0.4 MG/1
0.4 CAPSULE ORAL DAILY
Status: DISCONTINUED | OUTPATIENT
Start: 2023-01-27 | End: 2023-01-30

## 2023-01-27 NOTE — H&P
[facility]    PATIENT'S NAME: EVARISTO AGUIRREMARIA TERESA   ATTENDING PHYSICIAN: [address] Elberta Harada, MD   PATIENT ACCOUNT#:   [id number]    LOCATION:  [place] [address]  MEDICAL RECORD #:   [id number]       YOB: 1945  ADMISSION DATE:       01/27/2023    HISTORY AND PHYSICAL EXAMINATION    CHIEF COMPLAINT:  Indwelling Alejo catheter with urinary tract infection and possible early sepsis. HISTORY OF PRESENT ILLNESS:  Patient is a 49-year-old male who was sent to the hospital via ambulance for cloudy urine and decline in mental status for unknown length of time. CBC showed white blood cell count of 14.1 with left shift. Chemistry showed GFR of 53, which is below his baseline; BUN and creatinine of 19 and 1.38; glucose 192. Noted to be tachycardic on arrival.  Urine is cloudy. Alejo catheter was exchanged and urine culture and blood cultures were obtained. Started empirically on IV Rocephin and IV fluids. CT scan of the abdomen still pending. PAST MEDICAL HISTORY:  Recurrent urinary tract infections and history of ESBL bacteria, last documented in June 2022. Last urinary tract infection was with pansensitive proteus. He has diabetes mellitus type 2, hypertension, fecal incontinence, chronic indwelling Alejo catheter, generalized osteoarthritis. PAST SURGICAL HISTORY:  Tongue cancer resection in 2011, followed by radiation therapy. No other details are available. MEDICATIONS:  Please see medication reconciliation list.     FAMILY HISTORY:  Positive for cancer. Patient is not able to provide any other details. SOCIAL HISTORY:  No tobacco, alcohol, or drug use. Lives with his family. Requires assistance in his basic activities of daily living. REVIEW OF SYSTEMS:  Patient is not able to provide any details of review of systems considering his advanced dementia. PHYSICAL EXAMINATION:    GENERAL:  Patient is noncooperative and quite agitated at times.     VITAL SIGNS: Temperature 99.6, pulse 121, respiratory rate 20, blood pressure 146/85, pulse ox 97% on room air. HEENT:  Atraumatic. Oropharynx clear. Dry mucous membranes. Normal hard and soft palate. Eyes:  Anicteric sclerae. NECK:  Supple. No lymphadenopathy. Trachea midline. Full range of motion. LUNGS:  Clear to auscultation bilaterally. Normal respiratory effort. HEART:  Regular rate and rhythm. S1 and S2 auscultated. Tachycardic. ABDOMEN:  Soft, nondistended. No tenderness. Positive bowel sounds. EXTREMITIES:  No peripheral edema, clubbing or cyanosis. Muscle contractures noted on lower extremities. GENITOURINARY:  New Alejo catheter with penile hypospadias deformity. ASSESSMENT:    1. Urinary tract infection, possible early sepsis. 2.   Dehydration and hypovolemia. 3.   Diabetes mellitus type 2. PLAN:  Patient will be admitted to general medical floor. Start him on IV fluids. Monitor his hemodynamic status. Follow up on blood and urine cultures. Monitor Accu-Cheks. Fall and aspiration precautions. Further recommendations to follow.      Dictated By Milagros Mills MD  d: 01/27/2023 14:12:53  t: 01/27/2023 14:30:17  Job [id number]  MW/

## 2023-01-27 NOTE — ED INITIAL ASSESSMENT (HPI)
Arrived via [facility]. Complaints of blood in cath 24hr. Hx dementia. From home. Poor historian.

## 2023-01-28 ENCOUNTER — APPOINTMENT (OUTPATIENT)
Dept: ULTRASOUND IMAGING | Facility: HOSPITAL | Age: 78
End: 2023-01-28
Attending: INTERNAL MEDICINE
Payer: MEDICAID

## 2023-01-28 LAB
ANION GAP SERPL CALC-SCNC: 7 MMOL/L (ref 0–18)
BASOPHILS # BLD AUTO: 0.08 X10(3) UL (ref 0–0.2)
BASOPHILS NFR BLD AUTO: 0.8 %
BUN BLD-MCNC: 13 MG/DL (ref 7–18)
BUN/CREAT SERPL: 14.6 (ref 10–20)
CALCIUM BLD-MCNC: 8.4 MG/DL (ref 8.5–10.1)
CHLORIDE SERPL-SCNC: 112 MMOL/L (ref 98–112)
CO2 SERPL-SCNC: 23 MMOL/L (ref 21–32)
CREAT BLD-MCNC: 0.89 MG/DL
DEPRECATED RDW RBC AUTO: 46.5 FL (ref 35.1–46.3)
EOSINOPHIL # BLD AUTO: 0.17 X10(3) UL (ref 0–0.7)
EOSINOPHIL NFR BLD AUTO: 1.6 %
ERYTHROCYTE [DISTWIDTH] IN BLOOD BY AUTOMATED COUNT: 14.7 % (ref 11–15)
GFR SERPLBLD BASED ON 1.73 SQ M-ARVRAT: 88 ML/MIN/1.73M2 (ref 60–?)
GLUCOSE BLD-MCNC: 96 MG/DL (ref 70–99)
GLUCOSE BLDC GLUCOMTR-MCNC: 82 MG/DL (ref 70–99)
HCT VFR BLD AUTO: 32.6 %
HGB BLD-MCNC: 10.8 G/DL
IMM GRANULOCYTES # BLD AUTO: 0.05 X10(3) UL (ref 0–1)
IMM GRANULOCYTES NFR BLD: 0.5 %
LYMPHOCYTES # BLD AUTO: 2.12 X10(3) UL (ref 1–4)
LYMPHOCYTES NFR BLD AUTO: 20 %
MCH RBC QN AUTO: 28.3 PG (ref 26–34)
MCHC RBC AUTO-ENTMCNC: 33.1 G/DL (ref 31–37)
MCV RBC AUTO: 85.6 FL
MONOCYTES # BLD AUTO: 1.34 X10(3) UL (ref 0.1–1)
MONOCYTES NFR BLD AUTO: 12.6 %
NEUTROPHILS # BLD AUTO: 6.86 X10 (3) UL (ref 1.5–7.7)
NEUTROPHILS # BLD AUTO: 6.86 X10(3) UL (ref 1.5–7.7)
NEUTROPHILS NFR BLD AUTO: 64.5 %
OSMOLALITY SERPL CALC.SUM OF ELEC: 294 MOSM/KG (ref 275–295)
PLATELET # BLD AUTO: 220 10(3)UL (ref 150–450)
POTASSIUM SERPL-SCNC: 3.5 MMOL/L (ref 3.5–5.1)
RBC # BLD AUTO: 3.81 X10(6)UL
SODIUM SERPL-SCNC: 142 MMOL/L (ref 136–145)
WBC # BLD AUTO: 10.6 X10(3) UL (ref 4–11)

## 2023-01-28 PROCEDURE — 99233 SBSQ HOSP IP/OBS HIGH 50: CPT | Performed by: HOSPITALIST

## 2023-01-28 PROCEDURE — 76770 US EXAM ABDO BACK WALL COMP: CPT | Performed by: INTERNAL MEDICINE

## 2023-01-28 PROCEDURE — 99254 IP/OBS CNSLTJ NEW/EST MOD 60: CPT | Performed by: SURGERY

## 2023-01-28 RX ORDER — HALOPERIDOL 5 MG/ML
1 INJECTION INTRAMUSCULAR EVERY 6 HOURS PRN
Status: DISCONTINUED | OUTPATIENT
Start: 2023-01-28 | End: 2023-01-30

## 2023-01-28 RX ORDER — ACETAMINOPHEN 325 MG/1
650 TABLET ORAL EVERY 6 HOURS PRN
Status: DISCONTINUED | OUTPATIENT
Start: 2023-01-28 | End: 2023-01-30

## 2023-01-28 NOTE — PLAN OF CARE
Patient alert and oriented x1-2, dementia pt. Room air, resting on bed. Tylenol given for leg pain. Needs are met. Hourly rounding maintained. VAAC plan scored 5 during my shift. Haldol given for aggressiveness early during shift. US kidney ordered, unk when. Fluids decreased in rate 75ml. Novolog & Zosyn were dc'd. Will continue to monitor. Problem: Patient Centered Care  Goal: Patient preferences are identified and integrated in the patient's plan of care  Description: Interventions:  - What would you like us to know as we care for you?  From home with son  - Provide timely, complete, and accurate information to patient/family  - Incorporate patient and family knowledge, values, beliefs, and cultural backgrounds into the planning and delivery of care  - Encourage patient/family to participate in care and decision-making at the level they choose  - Honor patient and family perspectives and choices  Outcome: Progressing     Problem: Risk for Violence/Aggression  Goal: Absence of Violence/Aggression  Description: INTERVENTIONS:   - Identify precipitating factors for behavior   - Notify Charge RN/Provider   - Consider decreasing stimulation   - Consider distraction measures   - Consider discussion with provider regarding prn meds    - Consider SANDRINE (Moderate Risk only)   - Consider Code Support (High Risk only)   - Consider room safety checks   - Consider restraints  Outcome: Progressing

## 2023-01-28 NOTE — PLAN OF CARE
Patient alert and oriented x1-2 hx of dementia. Greenlandic speaking. Patient on room air . Patient is resting on bed. Patient is ambulatory with standby assist. Needs are met. Hourly rounding maintained. Call light within reach. Antibiotics scheduled, will continue to monitor. Chronic eddy in place, yudith urine output. Problem: Patient Centered Care  Goal: Patient preferences are identified and integrated in the patient's plan of care  Description: Interventions:  - What would you like us to know as we care for you?  From home with son   - Provide timely, complete, and accurate information to patient/family  - Incorporate patient and family knowledge, values, beliefs, and cultural backgrounds into the planning and delivery of care  - Encourage patient/family to participate in care and decision-making at the level they choose  - Honor patient and family perspectives and choices  Outcome: Progressing

## 2023-01-29 LAB
ANION GAP SERPL CALC-SCNC: 5 MMOL/L (ref 0–18)
BUN BLD-MCNC: 10 MG/DL (ref 7–18)
BUN/CREAT SERPL: 13.5 (ref 10–20)
CALCIUM BLD-MCNC: 8.3 MG/DL (ref 8.5–10.1)
CHLORIDE SERPL-SCNC: 111 MMOL/L (ref 98–112)
CO2 SERPL-SCNC: 24 MMOL/L (ref 21–32)
CREAT BLD-MCNC: 0.74 MG/DL
DEPRECATED RDW RBC AUTO: 46.1 FL (ref 35.1–46.3)
ERYTHROCYTE [DISTWIDTH] IN BLOOD BY AUTOMATED COUNT: 14.6 % (ref 11–15)
GFR SERPLBLD BASED ON 1.73 SQ M-ARVRAT: 93 ML/MIN/1.73M2 (ref 60–?)
GLUCOSE BLD-MCNC: 90 MG/DL (ref 70–99)
HCT VFR BLD AUTO: 35.2 %
HGB BLD-MCNC: 11.6 G/DL
MCH RBC QN AUTO: 28.2 PG (ref 26–34)
MCHC RBC AUTO-ENTMCNC: 33 G/DL (ref 31–37)
MCV RBC AUTO: 85.4 FL
OSMOLALITY SERPL CALC.SUM OF ELEC: 289 MOSM/KG (ref 275–295)
PLATELET # BLD AUTO: 165 10(3)UL (ref 150–450)
POTASSIUM SERPL-SCNC: 3.6 MMOL/L (ref 3.5–5.1)
RBC # BLD AUTO: 4.12 X10(6)UL
SODIUM SERPL-SCNC: 140 MMOL/L (ref 136–145)
WBC # BLD AUTO: 8 X10(3) UL (ref 4–11)

## 2023-01-29 PROCEDURE — 99233 SBSQ HOSP IP/OBS HIGH 50: CPT | Performed by: HOSPITALIST

## 2023-01-29 NOTE — PLAN OF CARE
Pt calm, resting in bed. Vitals stable. No signs of restlessness/aggressive towards staff. Fluids discontinued. PT/OT to eval weakness. Will continue to monitor. Problem: Patient Centered Care  Goal: Patient preferences are identified and integrated in the patient's plan of care  Description: Interventions:  - What would you like us to know as we care for you?  From home with son   - Provide timely, complete, and accurate information to patient/family  - Incorporate patient and family knowledge, values, beliefs, and cultural backgrounds into the planning and delivery of care  - Encourage patient/family to participate in care and decision-making at the level they choose  - Honor patient and family perspectives and choices  Outcome: Not Progressing     Problem: Risk for Violence/Aggression  Goal: Absence of Violence/Aggression  Description: INTERVENTIONS:   - Identify precipitating factors for behavior   - Notify Charge RN/Provider   - Consider decreasing stimulation   - Consider distraction measures   - Consider discussion with provider regarding prn meds    - Consider SANDRINE (Moderate Risk only)   - Consider Code Support (High Risk only)   - Consider room safety checks   - Consider restraints  Outcome: Not Progressing     Problem: PAIN - ADULT  Goal: Verbalizes/displays adequate comfort level or patient's stated pain goal  Description: INTERVENTIONS:  - Encourage pt to monitor pain and request assistance  - Assess pain using appropriate pain scale  - Administer analgesics based on type and severity of pain and evaluate response  - Implement non-pharmacological measures as appropriate and evaluate response  - Consider cultural and social influences on pain and pain management  - Manage/alleviate anxiety  - Utilize distraction and/or relaxation techniques  - Monitor for opioid side effects  - Notify MD/LIP if interventions unsuccessful or patient reports new pain  - Anticipate increased pain with activity and pre-medicate as appropriate  Outcome: Not Progressing     Problem: SAFETY ADULT - FALL  Goal: Free from fall injury  Description: INTERVENTIONS:  - Assess pt frequently for physical needs  - Identify cognitive and physical deficits and behaviors that affect risk of falls.   - [place] fall precautions as indicated by assessment.  - Educate pt/family on patient safety including physical limitations  - Instruct pt to call for assistance with activity based on assessment  - Modify environment to reduce risk of injury  - Provide assistive devices as appropriate  - Consider OT/PT consult to assist with strengthening/mobility  - Encourage toileting schedule  Outcome: Not Progressing     Problem: Altered Communication/Language Barrier  Goal: Patient/Family is able to understand and participate in their care  Description: Interventions:  - Assess communication ability and preferred communication style  - Implement communication aides and strategies  - Use visual cues when possible  - Listen attentively, be patient, do not interrupt  - Minimize distractions  - Allow time for understanding and response  - Establish method for patient to ask for assistance (call light)  - Provide an  as needed  - Communicate barriers and strategies to overcome with those who interact with patient  Outcome: Not Progressing     Problem: GENITOURINARY  Goal: Maintain optimal urinary function  Description: Interventions:  - Assess ability to void  - Assess for bladder distention  - Monitor creatinine and BUN  - Monitor intake and output  - Insert urinary catheter as ordered  - Obtain appropriate imaging as ordered  Outcome: Not Progressing

## 2023-01-29 NOTE — PLAN OF CARE
Problem: Patient Centered Care  Goal: Patient preferences are identified and integrated in the patient's plan of care  Description: Interventions:  - What would you like us to know as we care for you?  - Provide timely, complete, and accurate information to patient/family  - Incorporate patient and family knowledge, values, beliefs, and cultural backgrounds into the planning and delivery of care  - Encourage patient/family to participate in care and decision-making at the level they choose  - Honor patient and family perspectives and choices  Outcome: Progressing     Problem: Risk for Violence/Aggression  Goal: Absence of Violence/Aggression  Description: INTERVENTIONS:   - Identify precipitating factors for behavior   - Notify Charge RN/Provider   - Consider decreasing stimulation   - Consider distraction measures   - Consider discussion with provider regarding prn meds    - Consider SANDRINE (Moderate Risk only)   - Consider Code Support (High Risk only)   - Consider room safety checks   - Consider restraints  Outcome: Progressing     Problem: PAIN - ADULT  Goal: Verbalizes/displays adequate comfort level or patient's stated pain goal  Description: INTERVENTIONS:  - Encourage pt to monitor pain and request assistance  - Assess pain using appropriate pain scale  - Administer analgesics based on type and severity of pain and evaluate response  - Implement non-pharmacological measures as appropriate and evaluate response  - Consider cultural and social influences on pain and pain management  - Manage/alleviate anxiety  - Utilize distraction and/or relaxation techniques  - Monitor for opioid side effects  - Notify MD/LIP if interventions unsuccessful or patient reports new pain  - Anticipate increased pain with activity and pre-medicate as appropriate  Outcome: Progressing     Problem: SAFETY ADULT - FALL  Goal: Free from fall injury  Description: INTERVENTIONS:  - Assess pt frequently for physical needs  - Identify cognitive and physical deficits and behaviors that affect risk of falls. - [place] fall precautions as indicated by assessment.  - Educate pt/family on patient safety including physical limitations  - Instruct pt to call for assistance with activity based on assessment  - Modify environment to reduce risk of injury  - Provide assistive devices as appropriate  - Consider OT/PT consult to assist with strengthening/mobility  - Encourage toileting schedule  Outcome: Progressing     Problem: Altered Communication/Language Barrier  Goal: Patient/Family is able to understand and participate in their care  Description: Interventions:  - Assess communication ability and preferred communication style  - Implement communication aides and strategies  - Use visual cues when possible  - Listen attentively, be patient, do not interrupt  - Minimize distractions  - Allow time for understanding and response  - Establish method for patient to ask for assistance (call light)  - Provide an  as needed  - Communicate barriers and strategies to overcome with those who interact with patient  Outcome: Progressing     Problem: GENITOURINARY  Goal: Maintain optimal urinary function  Description: Interventions:  - Assess ability to void  - Assess for bladder distention  - Monitor creatinine and BUN  - Monitor intake and output  - Insert urinary catheter as ordered  - Obtain appropriate imaging as ordered  Outcome: Progressing   Pt lives home with son, admitted for hematuria. Chronic eddy, pt refuses pericare/eddy care, becomes agitated and combative. [facility] plan in place. IV abx and IVF. Repeat BC pending. No acute events overnight.

## 2023-01-30 VITALS
WEIGHT: 151.19 LBS | SYSTOLIC BLOOD PRESSURE: 131 MMHG | HEART RATE: 72 BPM | BODY MASS INDEX: 29 KG/M2 | OXYGEN SATURATION: 97 % | DIASTOLIC BLOOD PRESSURE: 63 MMHG | RESPIRATION RATE: 18 BRPM | TEMPERATURE: 99 F

## 2023-01-30 LAB
ANION GAP SERPL CALC-SCNC: 4 MMOL/L (ref 0–18)
BUN BLD-MCNC: 11 MG/DL (ref 7–18)
BUN/CREAT SERPL: 14.3 (ref 10–20)
CALCIUM BLD-MCNC: 8.7 MG/DL (ref 8.5–10.1)
CHLORIDE SERPL-SCNC: 107 MMOL/L (ref 98–112)
CO2 SERPL-SCNC: 27 MMOL/L (ref 21–32)
CREAT BLD-MCNC: 0.77 MG/DL
GFR SERPLBLD BASED ON 1.73 SQ M-ARVRAT: 92 ML/MIN/1.73M2 (ref 60–?)
GLUCOSE BLD-MCNC: 99 MG/DL (ref 70–99)
OSMOLALITY SERPL CALC.SUM OF ELEC: 285 MOSM/KG (ref 275–295)
POTASSIUM SERPL-SCNC: 3.5 MMOL/L (ref 3.5–5.1)
PROTEUS SP DNA BLD POS QL NAA+NON-PROBE: DETECTED
SODIUM SERPL-SCNC: 138 MMOL/L (ref 136–145)

## 2023-01-30 PROCEDURE — 99231 SBSQ HOSP IP/OBS SF/LOW 25: CPT | Performed by: PHYSICIAN ASSISTANT

## 2023-01-30 PROCEDURE — 99239 HOSP IP/OBS DSCHRG MGMT >30: CPT | Performed by: HOSPITALIST

## 2023-01-30 RX ORDER — CEFADROXIL 500 MG/1
1 CAPSULE ORAL 2 TIMES DAILY
Qty: 44 CAPSULE | Refills: 0 | Status: SHIPPED | OUTPATIENT
Start: 2023-01-30 | End: 2023-02-10

## 2023-01-30 RX ORDER — CEFAZOLIN SODIUM/WATER 2 G/20 ML
2 SYRINGE (ML) INTRAVENOUS EVERY 8 HOURS
Status: DISCONTINUED | OUTPATIENT
Start: 2023-01-30 | End: 2023-01-30

## 2023-01-30 NOTE — DISCHARGE PLANNING
MDO placed for discharge. Patient chart reviewed for discharge: Medication Reconciliation completed, Specialist/PCP follow up listed, and disease specific Instructions/Education included in After Visit Summary. Discharge RN notified patient's RN of AVS completion and verified all consultants have signed off. Patient's RN to notify DC RN if discharge status changes. 1240: Got in contact with pt daughter Morales Smyth [phone]. Per pt daughter request to have pt go by [facility]. Medicar set up for 1430. Pt daughter Gisela Davila, declined going over AVS and follow up care but instructed to make sure pt leaves with AVS. Endorsed to primary nurse.

## 2023-01-30 NOTE — CM/SW NOTE
Patient discussed in rounds, discharge today. PT/OT recommending home health. Initiated referral via [facility], [id number] sent. Confirmed plan w/ daughter Adair Jolley. 130pm: [address] ([phone]) accepted the referral, aware of discharge today.  Info added to patient's Lexi Parrish RN arranged Brittney Mon for [facility] [phone]

## 2023-01-30 NOTE — CM/SW NOTE
Department  notified of request for [facility], rocio referrals started. Assigned CM/SW to follow up with pt/family on further discharge planning.      Felice Mccullough   January 30, 2023   10:02

## 2023-01-30 NOTE — DISCHARGE SUMMARY
[facility] Hospitalist Discharge Summary   Patient ID:  Lawson Wood  [id number]  14 year old  8/18/1945    Admit date: 1/27/2023  Discharge date: 1/30/2023  Primary Care Physician: Shirley Plata MD   Attending Physician: Laura Haley MD   Consults:   Consultants     Provider Role Specialty    Dionna Canchola DO Consulting Physician  INFECTIOUS DISEASES    Moris Liang MD Consulting Physician  UROLOGY          Discharge Diagnoses:   Urinary tract infection associated with indwelling urethral catheter, initial encounter [facility])    Reason for admission  As per H&P: ***    Hospital Course:  ***    EXAM:   GENERAL: no apparent distress, comfortable  NEURO: A/A Ox3, no focal deficits  RESP: non labored, CTAB/L  CARDIO: Regular, no murmur  ABD: soft, NT, ND  EXTREMITIES: no edema, no calf tenderness    Operative Procedures:     Discharge Instructions     Medication List      CONTINUE taking these medications    Accu-Chek Francine Plus Strp  Check sugars daily as needed dispense lancets as well     acetaminophen 500 MG Tabs  Commonly known as: Tylenol Extra Strength     Diapers & Supplies Misc  Adult diapers     lisinopril 10 MG Tabs  Commonly known as: Zestril  Take 1 tablet (10 mg total) by mouth daily. Melatonin 5 MG Tabs     pantoprazole 40 MG Tbec  Commonly known as: Protonix     tamsulosin 0.4 MG Caps  Commonly known as: Flomax            Activity: {discharge activity:18710}  Diet: {diet:54080}  Wound Care: NA  Code Status: Full Code        Discharge Instructions       Complete course of antibiotics. FU with PCP in 1 week  FU with Dr Deacon Sultana in 2 weeks. Change eddy every month last changed 1/27/23          Important follow up: Follow-up Information     Carter Connor MD Follow up in 1 week(s). Specialty: Family Medicine  Contact information:  [facility] [place][note 2[facility]  [phone]             Roger Jacobson MD Follow up in 2 week(s).     Specialty: UROLOGY  Contact information:  [address]  [phone]                         -PCP in [] within 7 days [] within 14 days [] other     Disposition: {disposition:16341}  Discharged Condition: {condition:96252}    Hospital Discharge Diagnoses: {Enter hospital discharge diagnoses here (please select the wildcards and then replace with free text; this allows us to save this data discretely for reporting purposes:5961::\"***\"}    Lace+ Score: 82  59-90 High Risk  29-58 Medium Risk  0-28   Low Risk. TCM Follow-Up Recommendation:  {Care Managers will evaluate the need for follow-up for all patients ages 52+, and high/moderate risk patients ages 19-51.  Low risk patients (LACE < 29) will only be evaluated if the \"Still recommend for TCM follow-up\" option is selected from this list.:4996}            Total Time Coordinating Care: Greater than 30 minutes    Patient had opportunity to ask questions, state understanding, and agree with therapeutic plan as outlined    Ninoska Rollins MD  Hospitalist  1/30/2023

## 2023-01-30 NOTE — DISCHARGE INSTRUCTIONS
Complete course of antibiotics. FU with PCP in 1 week  FU with Dr Francisco Ledezma in 2 weeks. Change eddy every month last changed 1/27/23    Sometimes managing your health at home requires assistance. The [facility] team has recognized your preference to use [address]. They can be reached at ([phone]. The fax number for your reference is [phone]. A representative from the home health agency will contact you or your family to schedule your first visit.

## 2023-01-30 NOTE — PLAN OF CARE
Problem: Patient Centered Care  Goal: Patient preferences are identified and integrated in the patient's plan of care  Description: Interventions:  - What would you like us to know as we care for you?   - Provide timely, complete, and accurate information to patient/family  - Incorporate patient and family knowledge, values, beliefs, and cultural backgrounds into the planning and delivery of care  - Encourage patient/family to participate in care and decision-making at the level they choose  - Honor patient and family perspectives and choices  Outcome: Progressing     Problem: Risk for Violence/Aggression  Goal: Absence of Violence/Aggression  Description: INTERVENTIONS:   - Identify precipitating factors for behavior   - Notify Charge RN/Provider   - Consider decreasing stimulation   - Consider distraction measures   - Consider discussion with provider regarding prn meds    - Consider SANDRINE (Moderate Risk only)   - Consider Code Support (High Risk only)   - Consider room safety checks   - Consider restraints  Outcome: Progressing     Problem: PAIN - ADULT  Goal: Verbalizes/displays adequate comfort level or patient's stated pain goal  Description: INTERVENTIONS:  - Encourage pt to monitor pain and request assistance  - Assess pain using appropriate pain scale  - Administer analgesics based on type and severity of pain and evaluate response  - Implement non-pharmacological measures as appropriate and evaluate response  - Consider cultural and social influences on pain and pain management  - Manage/alleviate anxiety  - Utilize distraction and/or relaxation techniques  - Monitor for opioid side effects  - Notify MD/LIP if interventions unsuccessful or patient reports new pain  - Anticipate increased pain with activity and pre-medicate as appropriate  Outcome: Progressing     Problem: SAFETY ADULT - FALL  Goal: Free from fall injury  Description: INTERVENTIONS:  - Assess pt frequently for physical needs  - Identify cognitive and physical deficits and behaviors that affect risk of falls.   - [place] fall precautions as indicated by assessment.  - Educate pt/family on patient safety including physical limitations  - Instruct pt to call for assistance with activity based on assessment  - Modify environment to reduce risk of injury  - Provide assistive devices as appropriate  - Consider OT/PT consult to assist with strengthening/mobility  - Encourage toileting schedule  Outcome: Progressing     Problem: Altered Communication/Language Barrier  Goal: Patient/Family is able to understand and participate in their care  Description: Interventions:  - Assess communication ability and preferred communication style  - Implement communication aides and strategies  - Use visual cues when possible  - Listen attentively, be patient, do not interrupt  - Minimize distractions  - Allow time for understanding and response  - Establish method for patient to ask for assistance (call light)  - Provide an  as needed  - Communicate barriers and strategies to overcome with those who interact with patient  Outcome: Progressing     Problem: GENITOURINARY  Goal: Maintain optimal urinary function  Description: Interventions:  - Assess ability to void  - Assess for bladder distention  - Monitor creatinine and BUN  - Monitor intake and output  - Insert urinary catheter as ordered  - Obtain appropriate imaging as ordered  Outcome: Progressing

## 2023-01-30 NOTE — PLAN OF CARE
Patient discharged to home with family today transported by [employer]. Peripheral IV access removed. Alejo to remain in place upon discharge. Hortensia Esparza RN attempted to discuss AVS with pt's daughter by telephone; daughter declined. AVS sent home with patient. All belongings returned to patient upon discharge. Pt sent home with walker. Problem: Patient Centered Care  Goal: Patient preferences are identified and integrated in the patient's plan of care  Description: Interventions:  - What would you like us to know as we care for you?  Live with son  - Provide timely, complete, and accurate information to patient/family  - Incorporate patient and family knowledge, values, beliefs, and cultural backgrounds into the planning and delivery of care  - Encourage patient/family to participate in care and decision-making at the level they choose  - Honor patient and family perspectives and choices  Outcome: Adequate for Discharge     Problem: Risk for Violence/Aggression  Goal: Absence of Violence/Aggression  Description: INTERVENTIONS:   - Identify precipitating factors for behavior   - Notify Charge RN/Provider   - Consider decreasing stimulation   - Consider distraction measures   - Consider discussion with provider regarding prn meds    - Consider SANDRINE (Moderate Risk only)   - Consider Code Support (High Risk only)   - Consider room safety checks   - Consider restraints  Outcome: Adequate for Discharge     Problem: PAIN - ADULT  Goal: Verbalizes/displays adequate comfort level or patient's stated pain goal  Description: INTERVENTIONS:  - Encourage pt to monitor pain and request assistance  - Assess pain using appropriate pain scale  - Administer analgesics based on type and severity of pain and evaluate response  - Implement non-pharmacological measures as appropriate and evaluate response  - Consider cultural and social influences on pain and pain management  - Manage/alleviate anxiety  - Utilize distraction and/or relaxation techniques  - Monitor for opioid side effects  - Notify MD/LIP if interventions unsuccessful or patient reports new pain  - Anticipate increased pain with activity and pre-medicate as appropriate  Outcome: Adequate for Discharge     Problem: SAFETY ADULT - FALL  Goal: Free from fall injury  Description: INTERVENTIONS:  - Assess pt frequently for physical needs  - Identify cognitive and physical deficits and behaviors that affect risk of falls.   - [place] fall precautions as indicated by assessment.  - Educate pt/family on patient safety including physical limitations  - Instruct pt to call for assistance with activity based on assessment  - Modify environment to reduce risk of injury  - Provide assistive devices as appropriate  - Consider OT/PT consult to assist with strengthening/mobility  - Encourage toileting schedule  Outcome: Adequate for Discharge     Problem: Altered Communication/Language Barrier  Goal: Patient/Family is able to understand and participate in their care  Description: Interventions:  - Assess communication ability and preferred communication style  - Implement communication aides and strategies  - Use visual cues when possible  - Listen attentively, be patient, do not interrupt  - Minimize distractions  - Allow time for understanding and response  - Establish method for patient to ask for assistance (call light)  - Provide an  as needed  - Communicate barriers and strategies to overcome with those who interact with patient  Outcome: Adequate for Discharge     Problem: GENITOURINARY  Goal: Maintain optimal urinary function  Description: Interventions:  - Assess ability to void  - Assess for bladder distention  - Monitor creatinine and BUN  - Monitor intake and output  - Insert urinary catheter as ordered  - Obtain appropriate imaging as ordered  Outcome: Adequate for Discharge

## 2023-01-31 ENCOUNTER — PATIENT OUTREACH (OUTPATIENT)
Dept: CASE MANAGEMENT | Age: 78
End: 2023-01-31

## 2023-01-31 NOTE — PAYOR COMM NOTE
Discharge Notification    Patient Name: Gerald Elizalde [facility]  Payor: Moe Martinez #: [id number]  Authorization Number: [id number]  Admit Date/Time: 1/27/2023 1:04 PM  Discharge Date/Time: 1/30/2023 3:04 PM

## 2023-02-22 ENCOUNTER — TELEPHONE (OUTPATIENT)
Dept: SURGERY | Facility: CLINIC | Age: 78
End: 2023-02-22

## 2023-02-28 ENCOUNTER — HOSPITAL ENCOUNTER (EMERGENCY)
Facility: HOSPITAL | Age: 78
Discharge: HOME | End: 2023-02-28
Attending: STUDENT IN AN ORGANIZED HEALTH CARE EDUCATION/TRAINING PROGRAM
Payer: MEDICAID

## 2023-02-28 VITALS
OXYGEN SATURATION: 97 % | TEMPERATURE: 98 F | BODY MASS INDEX: 29 KG/M2 | HEART RATE: 63 BPM | WEIGHT: 151.25 LBS | SYSTOLIC BLOOD PRESSURE: 137 MMHG | DIASTOLIC BLOOD PRESSURE: 70 MMHG | RESPIRATION RATE: 23 BRPM

## 2023-02-28 DIAGNOSIS — Z76.89 ENCOUNTER FOR ASSESSMENT OF FOLEY CATHETER: Primary | ICD-10-CM

## 2023-02-28 PROCEDURE — 99283 EMERGENCY DEPT VISIT LOW MDM: CPT

## 2023-02-28 PROCEDURE — 51702 INSERT TEMP BLADDER CATH: CPT

## 2023-02-28 NOTE — ED QUICK NOTES
Patient approved for discharge per emergency department provider. Patient's daughter, Adair Jolley, given verbal and written discharge instructions. Given instructions  follow up with urology, and symptoms that necessitate coming back to the emergency department. Verbalizes understanding of discharge instructions. Patient aox norm out of ED via [facility]. Report given to ems.  Resp unlabored

## 2023-02-28 NOTE — ED INITIAL ASSESSMENT (HPI)
Pt to ED via EMS from home after pulling out his catheter. Pt was having his catheter replaced by home health RN and pulled out his catheter after balloon inflated. Trace blood noted at the scene. Pt somewhat combative at home.

## 2023-03-03 RX ORDER — TAMSULOSIN HYDROCHLORIDE 0.4 MG/1
CAPSULE ORAL
Qty: 90 CAPSULE | Refills: 3 | Status: SHIPPED | OUTPATIENT
Start: 2023-03-03

## 2023-03-07 ENCOUNTER — TELEPHONE (OUTPATIENT)
Dept: FAMILY MEDICINE CLINIC | Facility: CLINIC | Age: 78
End: 2023-03-07

## 2023-03-08 NOTE — TELEPHONE ENCOUNTER
Dandre Alvarez, patient is ready to proceed with surgery, please provide surgery request via epic/paper thanks dm

## 2023-03-15 NOTE — TELEPHONE ENCOUNTER
I have not seen this patient since September And he is notoriously noncompliant with care previously. Most of the time his chronic indwelling Alejo catheter is being replaced in the emergency department. I would like to see the patient in the office prior to agreeing to perform surgery on him. Please call his daughter and see if I can see him in the next 1 to 2 weeks to schedule surgery.

## 2023-03-31 NOTE — ED QUICK NOTES
Spoke with patient's daughter on the phone. Per daughter patient was sent to the ER because the bag is leaking and because the securement device comes off after the patient takes a shower. No

## 2023-05-03 ENCOUNTER — HOSPITAL ENCOUNTER (EMERGENCY)
Facility: HOSPITAL | Age: 78
Discharge: HOME | End: 2023-05-03
Attending: EMERGENCY MEDICINE
Payer: MEDICAID

## 2023-05-03 VITALS
OXYGEN SATURATION: 100 % | RESPIRATION RATE: 18 BRPM | TEMPERATURE: 97 F | HEIGHT: 61 IN | DIASTOLIC BLOOD PRESSURE: 86 MMHG | HEART RATE: 77 BPM | SYSTOLIC BLOOD PRESSURE: 153 MMHG | BODY MASS INDEX: 25.49 KG/M2 | WEIGHT: 135 LBS

## 2023-05-03 DIAGNOSIS — T83.021A DISPLACEMENT OF FOLEY CATHETER, INITIAL ENCOUNTER (HCC): Primary | ICD-10-CM

## 2023-05-03 LAB
ALBUMIN SERPL-MCNC: 3.4 G/DL (ref 3.4–5)
ALBUMIN/GLOB SERPL: 0.7 {RATIO} (ref 1–2)
ALP LIVER SERPL-CCNC: 73 U/L
ALT SERPL-CCNC: 15 U/L
ANION GAP SERPL CALC-SCNC: 4 MMOL/L (ref 0–18)
AST SERPL-CCNC: 14 U/L (ref 15–37)
BASOPHILS # BLD AUTO: 0.08 X10(3) UL (ref 0–0.2)
BASOPHILS NFR BLD AUTO: 1 %
BILIRUB SERPL-MCNC: 0.9 MG/DL (ref 0.1–2)
BILIRUB UR QL: NEGATIVE
BUN BLD-MCNC: 15 MG/DL (ref 7–18)
BUN/CREAT SERPL: 14.9 (ref 10–20)
CALCIUM BLD-MCNC: 9.4 MG/DL (ref 8.5–10.1)
CHLORIDE SERPL-SCNC: 106 MMOL/L (ref 98–112)
CO2 SERPL-SCNC: 29 MMOL/L (ref 21–32)
COLOR UR: YELLOW
CREAT BLD-MCNC: 1.01 MG/DL
DEPRECATED RDW RBC AUTO: 46 FL (ref 35.1–46.3)
EOSINOPHIL # BLD AUTO: 0.15 X10(3) UL (ref 0–0.7)
EOSINOPHIL NFR BLD AUTO: 1.9 %
ERYTHROCYTE [DISTWIDTH] IN BLOOD BY AUTOMATED COUNT: 14.4 % (ref 11–15)
GFR SERPLBLD BASED ON 1.73 SQ M-ARVRAT: 77 ML/MIN/1.73M2 (ref 60–?)
GLOBULIN PLAS-MCNC: 5.2 G/DL (ref 2.8–4.4)
GLUCOSE BLD-MCNC: 122 MG/DL (ref 70–99)
GLUCOSE UR-MCNC: NORMAL MG/DL
HCT VFR BLD AUTO: 47.7 %
HGB BLD-MCNC: 15.2 G/DL
IMM GRANULOCYTES # BLD AUTO: 0.03 X10(3) UL (ref 0–1)
IMM GRANULOCYTES NFR BLD: 0.4 %
KETONES UR-MCNC: NEGATIVE MG/DL
LEUKOCYTE ESTERASE UR QL STRIP.AUTO: 500
LYMPHOCYTES # BLD AUTO: 2.9 X10(3) UL (ref 1–4)
LYMPHOCYTES NFR BLD AUTO: 36.7 %
MCH RBC QN AUTO: 27.6 PG (ref 26–34)
MCHC RBC AUTO-ENTMCNC: 31.9 G/DL (ref 31–37)
MCV RBC AUTO: 86.7 FL
MONOCYTES # BLD AUTO: 0.4 X10(3) UL (ref 0.1–1)
MONOCYTES NFR BLD AUTO: 5.1 %
NEUTROPHILS # BLD AUTO: 4.35 X10 (3) UL (ref 1.5–7.7)
NEUTROPHILS # BLD AUTO: 4.35 X10(3) UL (ref 1.5–7.7)
NEUTROPHILS NFR BLD AUTO: 54.9 %
NITRITE UR QL STRIP.AUTO: NEGATIVE
OSMOLALITY SERPL CALC.SUM OF ELEC: 290 MOSM/KG (ref 275–295)
PH UR: 5.5 [PH] (ref 5–8)
PLATELET # BLD AUTO: 301 10(3)UL (ref 150–450)
POTASSIUM SERPL-SCNC: 4 MMOL/L (ref 3.5–5.1)
PROT SERPL-MCNC: 8.6 G/DL (ref 6.4–8.2)
RBC # BLD AUTO: 5.5 X10(6)UL
SODIUM SERPL-SCNC: 139 MMOL/L (ref 136–145)
SP GR UR STRIP: 1.02 (ref 1–1.03)
UROBILINOGEN UR STRIP-ACNC: NORMAL
WBC # BLD AUTO: 7.9 X10(3) UL (ref 4–11)
WBC #/AREA URNS AUTO: >50 /HPF

## 2023-05-03 PROCEDURE — 81001 URINALYSIS AUTO W/SCOPE: CPT | Performed by: EMERGENCY MEDICINE

## 2023-05-03 PROCEDURE — 99283 EMERGENCY DEPT VISIT LOW MDM: CPT

## 2023-05-03 PROCEDURE — 87186 SC STD MICRODIL/AGAR DIL: CPT | Performed by: EMERGENCY MEDICINE

## 2023-05-03 PROCEDURE — 36415 COLL VENOUS BLD VENIPUNCTURE: CPT

## 2023-05-03 PROCEDURE — 80053 COMPREHEN METABOLIC PANEL: CPT | Performed by: EMERGENCY MEDICINE

## 2023-05-03 PROCEDURE — 85025 COMPLETE CBC W/AUTO DIFF WBC: CPT | Performed by: EMERGENCY MEDICINE

## 2023-05-03 PROCEDURE — 80053 COMPREHEN METABOLIC PANEL: CPT

## 2023-05-03 PROCEDURE — 87077 CULTURE AEROBIC IDENTIFY: CPT | Performed by: EMERGENCY MEDICINE

## 2023-05-03 PROCEDURE — 51702 INSERT TEMP BLADDER CATH: CPT

## 2023-05-03 PROCEDURE — 85025 COMPLETE CBC W/AUTO DIFF WBC: CPT

## 2023-05-03 PROCEDURE — 87086 URINE CULTURE/COLONY COUNT: CPT | Performed by: EMERGENCY MEDICINE

## 2023-05-03 NOTE — ED INITIAL ASSESSMENT (HPI)
Patient here with indwelling eddy catheter reports discomfort at catheter insertion site after catheter may have gotten pulled. Patient has history of dementia and is poor historian.

## 2023-05-03 NOTE — ED QUICK NOTES
Called pt's daughter Ene Gary, she reports no one is able to come pick him up he will need a S ambulance home.

## 2023-05-04 NOTE — ED QUICK NOTES
Called Renay rosamaria's daughter to notify her he has been picked up from [address] [place] [address] by [place].

## 2023-05-15 ENCOUNTER — APPOINTMENT (OUTPATIENT)
Dept: CT IMAGING | Facility: HOSPITAL | Age: 78
End: 2023-05-15
Attending: EMERGENCY MEDICINE
Payer: MEDICAID

## 2023-05-15 ENCOUNTER — HOSPITAL ENCOUNTER (OUTPATIENT)
Facility: HOSPITAL | Age: 78
Setting detail: OBSERVATION
Discharge: HOME HEALTH | End: 2023-05-16
Attending: EMERGENCY MEDICINE | Admitting: HOSPITALIST
Payer: MEDICAID

## 2023-05-15 DIAGNOSIS — T83.511A URINARY TRACT INFECTION ASSOCIATED WITH INDWELLING URETHRAL CATHETER, INITIAL ENCOUNTER (HCC): Primary | ICD-10-CM

## 2023-05-15 DIAGNOSIS — N39.0 URINARY TRACT INFECTION ASSOCIATED WITH INDWELLING URETHRAL CATHETER, INITIAL ENCOUNTER (HCC): Primary | ICD-10-CM

## 2023-05-15 LAB
ALBUMIN SERPL-MCNC: 3.3 G/DL (ref 3.4–5)
ALBUMIN/GLOB SERPL: 0.7 {RATIO} (ref 1–2)
ALP LIVER SERPL-CCNC: 83 U/L
ALT SERPL-CCNC: 15 U/L
ANION GAP SERPL CALC-SCNC: 3 MMOL/L (ref 0–18)
AST SERPL-CCNC: 15 U/L (ref 15–37)
BASOPHILS # BLD AUTO: 0.09 X10(3) UL (ref 0–0.2)
BASOPHILS NFR BLD AUTO: 0.7 %
BILIRUB SERPL-MCNC: 0.7 MG/DL (ref 0.1–2)
BILIRUB UR QL: NEGATIVE
BUN BLD-MCNC: 19 MG/DL (ref 7–18)
BUN/CREAT SERPL: 22.6 (ref 10–20)
CALCIUM BLD-MCNC: 9.3 MG/DL (ref 8.5–10.1)
CHLORIDE SERPL-SCNC: 105 MMOL/L (ref 98–112)
CO2 SERPL-SCNC: 29 MMOL/L (ref 21–32)
COLOR UR: YELLOW
CREAT BLD-MCNC: 0.84 MG/DL
DEPRECATED RDW RBC AUTO: 45.7 FL (ref 35.1–46.3)
EOSINOPHIL # BLD AUTO: 0.18 X10(3) UL (ref 0–0.7)
EOSINOPHIL NFR BLD AUTO: 1.4 %
ERYTHROCYTE [DISTWIDTH] IN BLOOD BY AUTOMATED COUNT: 14.2 % (ref 11–15)
EST. AVERAGE GLUCOSE BLD GHB EST-MCNC: 111 MG/DL (ref 68–126)
GFR SERPLBLD BASED ON 1.73 SQ M-ARVRAT: 90 ML/MIN/1.73M2 (ref 60–?)
GLOBULIN PLAS-MCNC: 5 G/DL (ref 2.8–4.4)
GLUCOSE BLD-MCNC: 102 MG/DL (ref 70–99)
GLUCOSE BLDC GLUCOMTR-MCNC: 100 MG/DL (ref 70–99)
GLUCOSE BLDC GLUCOMTR-MCNC: 85 MG/DL (ref 70–99)
GLUCOSE BLDC GLUCOMTR-MCNC: 90 MG/DL (ref 70–99)
GLUCOSE BLDC GLUCOMTR-MCNC: 96 MG/DL (ref 70–99)
GLUCOSE BLDC GLUCOMTR-MCNC: 98 MG/DL (ref 70–99)
GLUCOSE UR-MCNC: NORMAL MG/DL
HBA1C MFR BLD: 5.5 % (ref ?–5.7)
HCT VFR BLD AUTO: 45.3 %
HGB BLD-MCNC: 14.5 G/DL
IMM GRANULOCYTES # BLD AUTO: 0.04 X10(3) UL (ref 0–1)
IMM GRANULOCYTES NFR BLD: 0.3 %
KETONES UR-MCNC: NEGATIVE MG/DL
LEUKOCYTE ESTERASE UR QL STRIP.AUTO: 500
LIPASE SERPL-CCNC: 49 U/L (ref 13–75)
LYMPHOCYTES # BLD AUTO: 4.35 X10(3) UL (ref 1–4)
LYMPHOCYTES NFR BLD AUTO: 34.2 %
MCH RBC QN AUTO: 28 PG (ref 26–34)
MCHC RBC AUTO-ENTMCNC: 32 G/DL (ref 31–37)
MCV RBC AUTO: 87.5 FL
MONOCYTES # BLD AUTO: 0.88 X10(3) UL (ref 0.1–1)
MONOCYTES NFR BLD AUTO: 6.9 %
NEUTROPHILS # BLD AUTO: 7.18 X10 (3) UL (ref 1.5–7.7)
NEUTROPHILS # BLD AUTO: 7.18 X10(3) UL (ref 1.5–7.7)
NEUTROPHILS NFR BLD AUTO: 56.5 %
NITRITE UR QL STRIP.AUTO: NEGATIVE
OSMOLALITY SERPL CALC.SUM OF ELEC: 286 MOSM/KG (ref 275–295)
PH UR: 5 [PH] (ref 5–8)
PLATELET # BLD AUTO: 308 10(3)UL (ref 150–450)
POTASSIUM SERPL-SCNC: 4 MMOL/L (ref 3.5–5.1)
PROT SERPL-MCNC: 8.3 G/DL (ref 6.4–8.2)
Q-T INTERVAL: 372 MS
QRS DURATION: 62 MS
QTC CALCULATION (BEZET): 429 MS
R AXIS: -7 DEGREES
RBC # BLD AUTO: 5.18 X10(6)UL
RBC #/AREA URNS AUTO: >10 /HPF
SODIUM SERPL-SCNC: 137 MMOL/L (ref 136–145)
SP GR UR STRIP: 1.02 (ref 1–1.03)
T AXIS: 11 DEGREES
UROBILINOGEN UR STRIP-ACNC: NORMAL
VENTRICULAR RATE: 80 BPM
WBC # BLD AUTO: 12.7 X10(3) UL (ref 4–11)
WBC #/AREA URNS AUTO: >50 /HPF

## 2023-05-15 PROCEDURE — 74177 CT ABD & PELVIS W/CONTRAST: CPT | Performed by: EMERGENCY MEDICINE

## 2023-05-15 PROCEDURE — 99223 1ST HOSP IP/OBS HIGH 75: CPT | Performed by: HOSPITALIST

## 2023-05-15 RX ORDER — BISACODYL 10 MG
10 SUPPOSITORY, RECTAL RECTAL
Status: DISCONTINUED | OUTPATIENT
Start: 2023-05-15 | End: 2023-05-16

## 2023-05-15 RX ORDER — ACETAMINOPHEN 500 MG
500 TABLET ORAL EVERY 4 HOURS PRN
Status: DISCONTINUED | OUTPATIENT
Start: 2023-05-15 | End: 2023-05-16

## 2023-05-15 RX ORDER — NICOTINE POLACRILEX 4 MG
30 LOZENGE BUCCAL
Status: DISCONTINUED | OUTPATIENT
Start: 2023-05-15 | End: 2023-05-16

## 2023-05-15 RX ORDER — NICOTINE POLACRILEX 4 MG
15 LOZENGE BUCCAL
Status: DISCONTINUED | OUTPATIENT
Start: 2023-05-15 | End: 2023-05-16

## 2023-05-15 RX ORDER — ACETAMINOPHEN 325 MG/1
650 TABLET ORAL EVERY 4 HOURS PRN
Status: DISCONTINUED | OUTPATIENT
Start: 2023-05-15 | End: 2023-05-16

## 2023-05-15 RX ORDER — POLYETHYLENE GLYCOL 3350 17 G/17G
17 POWDER, FOR SOLUTION ORAL DAILY PRN
Status: DISCONTINUED | OUTPATIENT
Start: 2023-05-15 | End: 2023-05-16

## 2023-05-15 RX ORDER — HYDRALAZINE HYDROCHLORIDE 20 MG/ML
20 INJECTION INTRAMUSCULAR; INTRAVENOUS EVERY 6 HOURS PRN
Status: DISCONTINUED | OUTPATIENT
Start: 2023-05-15 | End: 2023-05-16

## 2023-05-15 RX ORDER — HEPARIN SODIUM 5000 [USP'U]/ML
5000 INJECTION, SOLUTION INTRAVENOUS; SUBCUTANEOUS EVERY 8 HOURS SCHEDULED
Status: DISCONTINUED | OUTPATIENT
Start: 2023-05-15 | End: 2023-05-16

## 2023-05-15 RX ORDER — HYDROCODONE BITARTRATE AND ACETAMINOPHEN 5; 325 MG/1; MG/1
2 TABLET ORAL EVERY 4 HOURS PRN
Status: DISCONTINUED | OUTPATIENT
Start: 2023-05-15 | End: 2023-05-16

## 2023-05-15 RX ORDER — METOCLOPRAMIDE HYDROCHLORIDE 5 MG/ML
10 INJECTION INTRAMUSCULAR; INTRAVENOUS EVERY 8 HOURS PRN
Status: DISCONTINUED | OUTPATIENT
Start: 2023-05-15 | End: 2023-05-16

## 2023-05-15 RX ORDER — HYDROCODONE BITARTRATE AND ACETAMINOPHEN 5; 325 MG/1; MG/1
1 TABLET ORAL EVERY 4 HOURS PRN
Status: DISCONTINUED | OUTPATIENT
Start: 2023-05-15 | End: 2023-05-16

## 2023-05-15 RX ORDER — MAGNESIUM OXIDE 400 MG (241.3 MG MAGNESIUM) TABLET
5 TABLET NIGHTLY
Status: DISCONTINUED | OUTPATIENT
Start: 2023-05-15 | End: 2023-05-16

## 2023-05-15 RX ORDER — DEXTROSE MONOHYDRATE 25 G/50ML
50 INJECTION, SOLUTION INTRAVENOUS
Status: DISCONTINUED | OUTPATIENT
Start: 2023-05-15 | End: 2023-05-16

## 2023-05-15 RX ORDER — SODIUM CHLORIDE 9 MG/ML
INJECTION, SOLUTION INTRAVENOUS CONTINUOUS
Status: DISCONTINUED | OUTPATIENT
Start: 2023-05-15 | End: 2023-05-16

## 2023-05-15 RX ORDER — SENNOSIDES 8.6 MG
17.2 TABLET ORAL NIGHTLY PRN
Status: DISCONTINUED | OUTPATIENT
Start: 2023-05-15 | End: 2023-05-16

## 2023-05-15 RX ORDER — SODIUM CHLORIDE 9 MG/ML
INJECTION, SOLUTION INTRAVENOUS CONTINUOUS
Status: DISCONTINUED | OUTPATIENT
Start: 2023-05-15 | End: 2023-05-15

## 2023-05-15 RX ORDER — ENEMA 19; 7 G/133ML; G/133ML
1 ENEMA RECTAL ONCE AS NEEDED
Status: DISCONTINUED | OUTPATIENT
Start: 2023-05-15 | End: 2023-05-16

## 2023-05-15 RX ORDER — ONDANSETRON 2 MG/ML
4 INJECTION INTRAMUSCULAR; INTRAVENOUS EVERY 6 HOURS PRN
Status: DISCONTINUED | OUTPATIENT
Start: 2023-05-15 | End: 2023-05-16

## 2023-05-15 NOTE — TELEPHONE ENCOUNTER
"Chief Complaint   Patient presents with     Arthritis     Joints pain/nodules consult.     Vitals:    05/15/23 1028   BP: 102/56   BP Location: Right arm   Patient Position: Chair   Pulse: 88   Resp: 24   Temp: 97.9  F (36.6  C)   TempSrc: Tympanic   SpO2: 97%   Weight: 53 lb 2.1 oz (24.1 kg)   Height: 4' 0.31\" (122.7 cm)           Alejandrina Duckworth M.A.    May 15, 2023  " Rx: Metformin 500MG Tablets  Qty: 90  Sig: Take 1 Tablet by mouth daily with breakfast.

## 2023-05-15 NOTE — PLAN OF CARE
Problem: Patient Centered Care  Goal: Patient preferences are identified and integrated in the patient's plan of care  Description: Interventions:  - What would you like us to know as we care for you? Patient primarily Bengali speaking  - Provide timely, complete, and accurate information to patient/family  - Incorporate patient and family knowledge, values, beliefs, and cultural backgrounds into the planning and delivery of care  - Encourage patient/family to participate in care and decision-making at the level they choose  - Honor patient and family perspectives and choices  Outcome: Progressing     Problem: Patient/Family Goals  Goal: Patient/Family Long Term Goal  Description: Patient's Long Term Goal: Patient wants to return to living alone    Interventions:  - Patient will work with therapy   - See additional Care Plan goals for specific interventions  Outcome: Progressing  Goal: Patient/Family Short Term Goal  Description: Patient's Short Term Goal: Patient wants to go home to son.      Interventions:   - Patient will work with therapy and staff to ambulate safely  - See additional Care Plan goals for specific interventions  Outcome: Progressing     Problem: Diabetes/Glucose Control  Goal: Glucose maintained within prescribed range  Description: INTERVENTIONS:  - Monitor Blood Glucose as ordered  - Assess for signs and symptoms of hyperglycemia and hypoglycemia  - Administer ordered medications to maintain glucose within target range  - Assess barriers to adequate nutritional intake and initiate nutrition consult as needed  - Instruct patient on self management of diabetes  Outcome: Progressing     Problem: Risk for Violence/Aggression  Goal: Absence of Violence/Aggression  Description: INTERVENTIONS:   - Identify precipitating factors for behavior   - Notify Charge RN/Provider   - Consider decreasing stimulation   - Consider distraction measures   - Consider discussion with provider regarding prn meds    - Consider SANDRINE (Moderate Risk only)   - Consider Code Support (High Risk only)   - Consider room safety checks   - Consider restraints  Outcome: Progressing

## 2023-05-15 NOTE — ED INITIAL ASSESSMENT (HPI)
Patient to ED via stretcher, alert and orientated x2, French speaking, language line utilized. Per EMS, R abd and flank pain, language barrier at home, limited information received. Accucheck 96.

## 2023-05-16 VITALS
HEIGHT: 63 IN | DIASTOLIC BLOOD PRESSURE: 97 MMHG | OXYGEN SATURATION: 100 % | WEIGHT: 151.63 LBS | HEART RATE: 70 BPM | BODY MASS INDEX: 26.87 KG/M2 | TEMPERATURE: 98 F | RESPIRATION RATE: 18 BRPM | SYSTOLIC BLOOD PRESSURE: 124 MMHG

## 2023-05-16 LAB
ANION GAP SERPL CALC-SCNC: 3 MMOL/L (ref 0–18)
BASOPHILS # BLD AUTO: 0.07 X10(3) UL (ref 0–0.2)
BASOPHILS NFR BLD AUTO: 1 %
BUN BLD-MCNC: 16 MG/DL (ref 7–18)
BUN/CREAT SERPL: 24.2 (ref 10–20)
CALCIUM BLD-MCNC: 8.4 MG/DL (ref 8.5–10.1)
CHLORIDE SERPL-SCNC: 113 MMOL/L (ref 98–112)
CO2 SERPL-SCNC: 26 MMOL/L (ref 21–32)
CREAT BLD-MCNC: 0.66 MG/DL
DEPRECATED RDW RBC AUTO: 45.1 FL (ref 35.1–46.3)
EOSINOPHIL # BLD AUTO: 0.17 X10(3) UL (ref 0–0.7)
EOSINOPHIL NFR BLD AUTO: 2.5 %
ERYTHROCYTE [DISTWIDTH] IN BLOOD BY AUTOMATED COUNT: 14.2 % (ref 11–15)
GFR SERPLBLD BASED ON 1.73 SQ M-ARVRAT: 97 ML/MIN/1.73M2 (ref 60–?)
GLUCOSE BLD-MCNC: 89 MG/DL (ref 70–99)
GLUCOSE BLDC GLUCOMTR-MCNC: 110 MG/DL (ref 70–99)
GLUCOSE BLDC GLUCOMTR-MCNC: 76 MG/DL (ref 70–99)
GLUCOSE BLDC GLUCOMTR-MCNC: 87 MG/DL (ref 70–99)
HCT VFR BLD AUTO: 39 %
HGB BLD-MCNC: 12.6 G/DL
IMM GRANULOCYTES # BLD AUTO: 0.02 X10(3) UL (ref 0–1)
IMM GRANULOCYTES NFR BLD: 0.3 %
LYMPHOCYTES # BLD AUTO: 3.16 X10(3) UL (ref 1–4)
LYMPHOCYTES NFR BLD AUTO: 45.7 %
MCH RBC QN AUTO: 28 PG (ref 26–34)
MCHC RBC AUTO-ENTMCNC: 32.3 G/DL (ref 31–37)
MCV RBC AUTO: 86.7 FL
MONOCYTES # BLD AUTO: 0.61 X10(3) UL (ref 0.1–1)
MONOCYTES NFR BLD AUTO: 8.8 %
NEUTROPHILS # BLD AUTO: 2.89 X10 (3) UL (ref 1.5–7.7)
NEUTROPHILS # BLD AUTO: 2.89 X10(3) UL (ref 1.5–7.7)
NEUTROPHILS NFR BLD AUTO: 41.7 %
OSMOLALITY SERPL CALC.SUM OF ELEC: 295 MOSM/KG (ref 275–295)
PLATELET # BLD AUTO: 229 10(3)UL (ref 150–450)
POTASSIUM SERPL-SCNC: 3.6 MMOL/L (ref 3.5–5.1)
RBC # BLD AUTO: 4.5 X10(6)UL
SODIUM SERPL-SCNC: 142 MMOL/L (ref 136–145)
WBC # BLD AUTO: 6.9 X10(3) UL (ref 4–11)

## 2023-05-16 PROCEDURE — 99239 HOSP IP/OBS DSCHRG MGMT >30: CPT | Performed by: HOSPITALIST

## 2023-05-16 RX ORDER — SULFAMETHOXAZOLE AND TRIMETHOPRIM 800; 160 MG/1; MG/1
1 TABLET ORAL EVERY 12 HOURS SCHEDULED
Status: DISCONTINUED | OUTPATIENT
Start: 2023-05-16 | End: 2023-05-16

## 2023-05-16 RX ORDER — SULFAMETHOXAZOLE AND TRIMETHOPRIM 800; 160 MG/1; MG/1
1 TABLET ORAL 2 TIMES DAILY
Qty: 14 TABLET | Refills: 0 | Status: SHIPPED | OUTPATIENT
Start: 2023-05-16 | End: 2023-05-23

## 2023-05-16 NOTE — PLAN OF CARE
Problem: Diabetes/Glucose Control  Goal: Glucose maintained within prescribed range  Description: INTERVENTIONS:  - Monitor Blood Glucose as ordered  - Assess for signs and symptoms of hyperglycemia and hypoglycemia  - Administer ordered medications to maintain glucose within target range  - Assess barriers to adequate nutritional intake and initiate nutrition consult as needed  - Instruct patient on self management of diabetes  Outcome: Progressing     Problem: Risk for Violence/Aggression  Goal: Absence of Violence/Aggression  Description: INTERVENTIONS:   - Identify precipitating factors for behavior   - Notify Charge RN/Provider   - Consider decreasing stimulation   - Consider distraction measures   - Consider discussion with provider regarding prn meds    - Consider SANDRINE (Moderate Risk only)   - Consider Code Support (High Risk only)   - Consider room safety checks   - Consider restraints  Outcome: Progressing     Problem: METABOLIC/FLUID AND ELECTROLYTES - ADULT  Goal: Hemodynamic stability and optimal renal function maintained  Description: INTERVENTIONS:  - Monitor labs and assess for signs and symptoms of volume excess or deficit  - Monitor intake, output and patient weight  - Monitor urine specific gravity, serum osmolarity and serum sodium as indicated or ordered  - Monitor response to interventions for patient's volume status, including labs, urine output, blood pressure (other measures as available)  - Encourage oral intake as appropriate  - Instruct patient on fluid and nutrition restrictions as appropriate  Outcome: Progressing     Problem: PAIN - ADULT  Goal: Verbalizes/displays adequate comfort level or patient's stated pain goal  Description: INTERVENTIONS:  - Encourage pt to monitor pain and request assistance  - Assess pain using appropriate pain scale  - Administer analgesics based on type and severity of pain and evaluate response  - Implement non-pharmacological measures as appropriate and evaluate response  - Consider cultural and social influences on pain and pain management  - Manage/alleviate anxiety  - Utilize distraction and/or relaxation techniques  - Monitor for opioid side effects  - Notify MD/LIP if interventions unsuccessful or patient reports new pain  - Anticipate increased pain with activity and pre-medicate as appropriate  Outcome: Progressing     Problem: RISK FOR INFECTION - ADULT  Goal: Absence of fever/infection during anticipated neutropenic period  Description: INTERVENTIONS  - Monitor WBC  - Administer growth factors as ordered  - Implement neutropenic guidelines  Outcome: Progressing     Problem: SAFETY ADULT - FALL  Goal: Free from fall injury  Description: INTERVENTIONS:  - Assess pt frequently for physical needs  - Identify cognitive and physical deficits and behaviors that affect risk of falls.   - [place] fall precautions as indicated by assessment.  - Educate pt/family on patient safety including physical limitations  - Instruct pt to call for assistance with activity based on assessment  - Modify environment to reduce risk of injury  - Provide assistive devices as appropriate  - Consider OT/PT consult to assist with strengthening/mobility  - Encourage toileting schedule  Outcome: Progressing     Problem: DISCHARGE PLANNING  Goal: Discharge to home or other facility with appropriate resources  Description: INTERVENTIONS:  - Identify barriers to discharge w/pt and caregiver  - Include patient/family/discharge partner in discharge planning  - Arrange for needed discharge resources and transportation as appropriate  - Identify discharge learning needs (meds, wound care, etc)  - Arrange for interpreters to assist at discharge as needed  - Consider post-discharge preferences of patient/family/discharge partner  - Complete POLST form as appropriate  - Assess patient's ability to be responsible for managing their own health  - Refer to Case Management Department for coordinating discharge planning if the patient needs post-hospital services based on physician/LIP order or complex needs related to functional status, cognitive ability or social support system  Outcome: Progressing     Patient alert and verbally responsive, Kiswahili speaking with some English. Monitoring vitals, blood glucose. Complained of pain, as needed tylenol administered. IV fluids and IV antibiotics. Safety measures in place. Frequent rounding by nursing staff.

## 2023-05-16 NOTE — PLAN OF CARE
Calm, cooperative all shift. Problem: Patient Centered Care  Goal: Patient preferences are identified and integrated in the patient's plan of care  Description: Interventions:  - What would you like us to know as we care for you? From home with son.   - Provide timely, complete, and accurate information to patient/family  - Incorporate patient and family knowledge, values, beliefs, and cultural backgrounds into the planning and delivery of care  - Encourage patient/family to participate in care and decision-making at the level they choose  - Honor patient and family perspectives and choices  Outcome: Progressing     Problem: Patient/Family Goals  Goal: Patient/Family Long Term Goal  Description: Patient's Long Term Goal: go home    Interventions:  - therapy evaluation, antibiotics  - See additional Care Plan goals for specific interventions  Outcome: Progressing  Goal: Patient/Family Short Term Goal  Description: Patient's Short Term Goal: no pain    Interventions:   - antibiotics, pain meds  - See additional Care Plan goals for specific interventions  Outcome: Progressing     Problem: Diabetes/Glucose Control  Goal: Glucose maintained within prescribed range  Description: INTERVENTIONS:  - Monitor Blood Glucose as ordered  - Assess for signs and symptoms of hyperglycemia and hypoglycemia  - Administer ordered medications to maintain glucose within target range  - Assess barriers to adequate nutritional intake and initiate nutrition consult as needed  - Instruct patient on self management of diabetes  Outcome: Progressing     Problem: Risk for Violence/Aggression  Goal: Absence of Violence/Aggression  Description: INTERVENTIONS:   - Identify precipitating factors for behavior   - Notify Charge RN/Provider   - Consider decreasing stimulation   - Consider distraction measures   - Consider discussion with provider regarding prn meds    - Consider SANDRINE (Moderate Risk only)   - Consider Code Support (High Risk only)   - Consider room safety checks   - Consider restraints  Outcome: Progressing     Problem: METABOLIC/FLUID AND ELECTROLYTES - ADULT  Goal: Hemodynamic stability and optimal renal function maintained  Description: INTERVENTIONS:  - Monitor labs and assess for signs and symptoms of volume excess or deficit  - Monitor intake, output and patient weight  - Monitor urine specific gravity, serum osmolarity and serum sodium as indicated or ordered  - Monitor response to interventions for patient's volume status, including labs, urine output, blood pressure (other measures as available)  - Encourage oral intake as appropriate  - Instruct patient on fluid and nutrition restrictions as appropriate  Outcome: Progressing     Problem: PAIN - ADULT  Goal: Verbalizes/displays adequate comfort level or patient's stated pain goal  Description: INTERVENTIONS:  - Encourage pt to monitor pain and request assistance  - Assess pain using appropriate pain scale  - Administer analgesics based on type and severity of pain and evaluate response  - Implement non-pharmacological measures as appropriate and evaluate response  - Consider cultural and social influences on pain and pain management  - Manage/alleviate anxiety  - Utilize distraction and/or relaxation techniques  - Monitor for opioid side effects  - Notify MD/LIP if interventions unsuccessful or patient reports new pain  - Anticipate increased pain with activity and pre-medicate as appropriate  Outcome: Progressing     Problem: RISK FOR INFECTION - ADULT  Goal: Absence of fever/infection during anticipated neutropenic period  Description: INTERVENTIONS  - Monitor WBC  - Administer growth factors as ordered  - Implement neutropenic guidelines  Outcome: Progressing     Problem: SAFETY ADULT - FALL  Goal: Free from fall injury  Description: INTERVENTIONS:  - Assess pt frequently for physical needs  - Identify cognitive and physical deficits and behaviors that affect risk of falls.  - [place] fall precautions as indicated by assessment.  - Educate pt/family on patient safety including physical limitations  - Instruct pt to call for assistance with activity based on assessment  - Modify environment to reduce risk of injury  - Provide assistive devices as appropriate  - Consider OT/PT consult to assist with strengthening/mobility  - Encourage toileting schedule  Outcome: Progressing     Problem: DISCHARGE PLANNING  Goal: Discharge to home or other facility with appropriate resources  Description: INTERVENTIONS:  - Identify barriers to discharge w/pt and caregiver  - Include patient/family/discharge partner in discharge planning  - Arrange for needed discharge resources and transportation as appropriate  - Identify discharge learning needs (meds, wound care, etc)  - Arrange for interpreters to assist at discharge as needed  - Consider post-discharge preferences of patient/family/discharge partner  - Complete POLST form as appropriate  - Assess patient's ability to be responsible for managing their own health  - Refer to Case Management Department for coordinating discharge planning if the patient needs post-hospital services based on physician/LIP order or complex needs related to functional status, cognitive ability or social support system  Outcome: Progressing

## 2023-05-16 NOTE — DISCHARGE INSTRUCTIONS
Resume Home Health services- PT and RN ordered    [facility].  [address] [facility], [address] [address] [address], [address]  Phone: [phone]  Fax: [phone]

## 2023-05-16 NOTE — CM/SW NOTE
05/16/23 1552   Discharge disposition   Expected discharge disposition Home or [facility]   Discharge transportation [facility]     Patient has discharge orders. Patient is current with [facility]- PT and RN orders written. Referral faxed to [address] [place] [address].  is aware of dc today. CM talked with daughter who requested transfer home. Ambulance set up for safety needs and weill be here at 7:30pm.  Message left on daughter's phone with 7:30pm pick-up time. Patient instructed MD that his daughter will be picking up. CM tried daughter again and was unable to confirm. Poonam LOPEZ asked to cancel transport if daughter will be picking up. Daughter is aware of discharge orders for today.     Tony Murdock MBA BSN RN [address]  RN Case Manager  [phone]

## 2023-05-16 NOTE — CM/SW NOTE
CM attempted to reach son and daughter for discharge planning needs without success. CM to follow-up. PT scheduled to see patient today. Last [facility] referral was 4 months ago to [address] [place] [address]. CM left message to [facility] intake department to see if current and referral sent in [place]. Requires F2F once disciplines are known. PLAN:  Most likely home with [facility]. CM to confirm if current with [facility]. / to remain available for support and/or discharge planning.      Chetan Parekh MBA BSN RN [address]  RN Case Manager  [phone]

## 2023-05-17 ENCOUNTER — PATIENT OUTREACH (OUTPATIENT)
Dept: CASE MANAGEMENT | Age: 78
End: 2023-05-17

## 2023-06-09 ENCOUNTER — APPOINTMENT (OUTPATIENT)
Dept: CT IMAGING | Facility: HOSPITAL | Age: 78
End: 2023-06-09
Attending: EMERGENCY MEDICINE
Payer: MEDICAID

## 2023-06-09 ENCOUNTER — HOSPITAL ENCOUNTER (INPATIENT)
Facility: HOSPITAL | Age: 78
LOS: 1 days | Discharge: HOME | End: 2023-06-10
Attending: EMERGENCY MEDICINE | Admitting: HOSPITALIST
Payer: MEDICAID

## 2023-06-09 ENCOUNTER — HOSPITAL ENCOUNTER (OUTPATIENT)
Facility: HOSPITAL | Age: 78
Setting detail: OBSERVATION
Discharge: HOME HEALTH | End: 2023-06-10
Attending: EMERGENCY MEDICINE | Admitting: HOSPITALIST
Payer: MEDICAID

## 2023-06-09 DIAGNOSIS — R31.0 GROSS HEMATURIA: Primary | ICD-10-CM

## 2023-06-09 PROBLEM — Z97.8 CHRONIC INDWELLING FOLEY CATHETER: Status: ACTIVE | Noted: 2022-10-17

## 2023-06-09 LAB
ANION GAP SERPL CALC-SCNC: 6 MMOL/L (ref 0–18)
ATRIAL RATE: 83 BPM
BASOPHILS # BLD AUTO: 0.08 X10(3) UL (ref 0–0.2)
BASOPHILS NFR BLD AUTO: 1 %
BILIRUB UR QL: NEGATIVE
BUN BLD-MCNC: 14 MG/DL (ref 7–18)
BUN/CREAT SERPL: 15.1 (ref 10–20)
CALCIUM BLD-MCNC: 9.4 MG/DL (ref 8.5–10.1)
CHLORIDE SERPL-SCNC: 103 MMOL/L (ref 98–112)
CO2 SERPL-SCNC: 28 MMOL/L (ref 21–32)
CREAT BLD-MCNC: 0.93 MG/DL
DEPRECATED RDW RBC AUTO: 43.3 FL (ref 35.1–46.3)
EOSINOPHIL # BLD AUTO: 0.12 X10(3) UL (ref 0–0.7)
EOSINOPHIL NFR BLD AUTO: 1.6 %
ERYTHROCYTE [DISTWIDTH] IN BLOOD BY AUTOMATED COUNT: 13.9 % (ref 11–15)
GFR SERPLBLD BASED ON 1.73 SQ M-ARVRAT: 85 ML/MIN/1.73M2 (ref 60–?)
GLUCOSE BLD-MCNC: 134 MG/DL (ref 70–99)
GLUCOSE BLDC GLUCOMTR-MCNC: 108 MG/DL (ref 70–99)
GLUCOSE BLDC GLUCOMTR-MCNC: 130 MG/DL (ref 70–99)
GLUCOSE UR-MCNC: NORMAL MG/DL
HCT VFR BLD AUTO: 44.7 %
HGB BLD-MCNC: 14.6 G/DL
IMM GRANULOCYTES # BLD AUTO: 0.02 X10(3) UL (ref 0–1)
IMM GRANULOCYTES NFR BLD: 0.3 %
KETONES UR-MCNC: NEGATIVE MG/DL
LEUKOCYTE ESTERASE UR QL STRIP.AUTO: 75
LYMPHOCYTES # BLD AUTO: 2.07 X10(3) UL (ref 1–4)
LYMPHOCYTES NFR BLD AUTO: 26.7 %
MCH RBC QN AUTO: 27.8 PG (ref 26–34)
MCHC RBC AUTO-ENTMCNC: 32.7 G/DL (ref 31–37)
MCV RBC AUTO: 85 FL
MONOCYTES # BLD AUTO: 0.34 X10(3) UL (ref 0.1–1)
MONOCYTES NFR BLD AUTO: 4.4 %
NEUTROPHILS # BLD AUTO: 5.11 X10 (3) UL (ref 1.5–7.7)
NEUTROPHILS # BLD AUTO: 5.11 X10(3) UL (ref 1.5–7.7)
NEUTROPHILS NFR BLD AUTO: 66 %
NITRITE UR QL STRIP.AUTO: NEGATIVE
OSMOLALITY SERPL CALC.SUM OF ELEC: 286 MOSM/KG (ref 275–295)
P AXIS: 60 DEGREES
P-R INTERVAL: 178 MS
PH UR: 6 [PH] (ref 5–8)
PLATELET # BLD AUTO: 268 10(3)UL (ref 150–450)
POTASSIUM SERPL-SCNC: 4 MMOL/L (ref 3.5–5.1)
PROT UR-MCNC: 70 MG/DL
Q-T INTERVAL: 352 MS
QRS DURATION: 72 MS
QTC CALCULATION (BEZET): 413 MS
R AXIS: 6 DEGREES
RBC # BLD AUTO: 5.26 X10(6)UL
RBC #/AREA URNS AUTO: >10 /HPF
SODIUM SERPL-SCNC: 137 MMOL/L (ref 136–145)
SP GR UR STRIP: 1.01 (ref 1–1.03)
T AXIS: 55 DEGREES
UROBILINOGEN UR STRIP-ACNC: NORMAL
VENTRICULAR RATE: 83 BPM
WBC # BLD AUTO: 7.7 X10(3) UL (ref 4–11)

## 2023-06-09 PROCEDURE — 74176 CT ABD & PELVIS W/O CONTRAST: CPT | Performed by: EMERGENCY MEDICINE

## 2023-06-09 PROCEDURE — 99222 1ST HOSP IP/OBS MODERATE 55: CPT | Performed by: HOSPITALIST

## 2023-06-09 PROCEDURE — 99222 1ST HOSP IP/OBS MODERATE 55: CPT | Performed by: UROLOGY

## 2023-06-09 RX ORDER — DEXTROSE MONOHYDRATE 25 G/50ML
50 INJECTION, SOLUTION INTRAVENOUS
Status: DISCONTINUED | OUTPATIENT
Start: 2023-06-09 | End: 2023-06-10

## 2023-06-09 RX ORDER — KETAMINE HYDROCHLORIDE 50 MG/ML
0.3 INJECTION, SOLUTION, CONCENTRATE INTRAMUSCULAR; INTRAVENOUS ONCE AS NEEDED
Status: COMPLETED | OUTPATIENT
Start: 2023-06-09 | End: 2023-06-09

## 2023-06-09 RX ORDER — NICOTINE POLACRILEX 4 MG
30 LOZENGE BUCCAL
Status: DISCONTINUED | OUTPATIENT
Start: 2023-06-09 | End: 2023-06-10

## 2023-06-09 RX ORDER — LIDOCAINE HYDROCHLORIDE 20 MG/ML
10 JELLY TOPICAL ONCE
Status: COMPLETED | OUTPATIENT
Start: 2023-06-09 | End: 2023-06-09

## 2023-06-09 RX ORDER — SODIUM CHLORIDE 9 MG/ML
INJECTION, SOLUTION INTRAVENOUS CONTINUOUS
Status: DISCONTINUED | OUTPATIENT
Start: 2023-06-09 | End: 2023-06-10

## 2023-06-09 RX ORDER — ACETAMINOPHEN 500 MG
500 TABLET ORAL EVERY 4 HOURS PRN
Status: DISCONTINUED | OUTPATIENT
Start: 2023-06-09 | End: 2023-06-10

## 2023-06-09 RX ORDER — LISINOPRIL 10 MG/1
10 TABLET ORAL DAILY
Status: DISCONTINUED | OUTPATIENT
Start: 2023-06-09 | End: 2023-06-10

## 2023-06-09 RX ORDER — OLANZAPINE 5 MG/1
5 TABLET, ORALLY DISINTEGRATING ORAL EVERY 12 HOURS PRN
Status: DISCONTINUED | OUTPATIENT
Start: 2023-06-09 | End: 2023-06-10

## 2023-06-09 RX ORDER — ONDANSETRON 2 MG/ML
4 INJECTION INTRAMUSCULAR; INTRAVENOUS EVERY 6 HOURS PRN
Status: DISCONTINUED | OUTPATIENT
Start: 2023-06-09 | End: 2023-06-10

## 2023-06-09 RX ORDER — NICOTINE POLACRILEX 4 MG
15 LOZENGE BUCCAL
Status: DISCONTINUED | OUTPATIENT
Start: 2023-06-09 | End: 2023-06-10

## 2023-06-09 RX ORDER — MAGNESIUM HYDROXIDE 1200 MG/15ML
3000 LIQUID ORAL CONTINUOUS
Status: DISCONTINUED | OUTPATIENT
Start: 2023-06-09 | End: 2023-06-10

## 2023-06-09 NOTE — ED QUICK NOTES
Eddy irrigation performed. Hard to flush saline through catheter. Many clots and bright red blood noted to eddy tubing. MD aware. Pt very uncomfortable. Pt attempting to hit and bite staff. Attempted to verbally redirect patient. Pt with hx of dementia. MD aware of patient behavior.

## 2023-06-09 NOTE — PROGRESS NOTES
Spoke with daughter about pt admission. Daughter gave me the number of the daughter in law to get med list from. No answer, call attempted twice. Med list done from last admission paperwork in May. Of importance from last admission-- pt on soft, easy to chew diet and is vegetarian.

## 2023-06-09 NOTE — H&P
[facility]    PATIENT'S NAME: Vasiliy Hong [facility]   ATTENDING PHYSICIAN: Sumanth Matt MD   PATIENT ACCOUNT#:   [id number]    LOCATION:  [address] [place] [address],[address] #:   [id number]       YOB: 1945  ADMISSION DATE:       06/09/2023    HISTORY AND PHYSICAL EXAMINATION    CHIEF COMPLAINT:  Gross hematuria. HISTORY OF PRESENT ILLNESS:  Patient is a 59-year-old [facility] male with chronic indwelling Alejo catheter. He was sent today to the emergency department for evaluation of gross hematuria. CBC and chemistry are unremarkable. Urinalysis is still pending. He was started on CBI system. CT scan of the abdomen and pelvis showed no acute findings. Patient was started on IV Rocephin. He will be admitted to the hospital for further management. PAST MEDICAL HISTORY:  Patient has a history of enlarged prostate, indwelling Alejo catheter with recurrent urinary tract infections with various bacteria, including ESBL, E coli, and Proteus mirabilis. He has a history of diabetes mellitus type 2, hypertension, fecal incontinence, generalized osteoarthritis. 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 MEDICATIONS:  Please see medication reconciliation list.      FAMILY HISTORY:  Positive for cancer; patient is not able to provide any details. SOCIAL HISTORY:  No tobacco, alcohol, or drug use. Lives with his family. Requires assistance in his basic activities of daily living. REVIEW OF SYSTEMS:  Difficult to obtain from patient. He has dementia. Apparently, gross hematuria started today through Alejo catheter tubing. PHYSICAL EXAMINATION:    GENERAL:  Alert. No acute distress. VITAL SIGNS:  Temperature 98.2, pulse 81, respiratory rate 20, blood pressure 125/62, pulse ox 97% on room air. HEENT:  Atraumatic. Oropharynx clear. Moist mucous membranes. Normal hard and soft palate. Eyes:  Anicteric sclerae.    NECK: Supple. No lymphadenopathy. Trachea midline. Full range of motion. LUNGS:  Clear to auscultation bilaterally. Normal respiratory effort. HEART:  Regular rate and rhythm. S1, S2 auscultated. No murmur. ABDOMEN:  Soft, nondistended. No tenderness. Positive bowel sounds. EXTREMITIES:  No edema, clubbing, or cyanosis. Alejo catheter in place with gross hematuria, CBI system in place. NEUROLOGIC:  Motor and sensory intact. ASSESSMENT:  Gross hematuria, could be hemorrhagic cystitis with indwelling Alejo catheter. PLAN:  Will await official imaging study results, CT scan of the abdomen and pelvis. Start him on IV Rocephin. Obtain urology consult. Monitor Accu-Cheks. Urinalysis and reflex culture still pending. Further recommendations to follow.     Dictated By Crescencio De Leon MD  d: 06/09/2023 50:13:63  t: 06/09/2023 18:49:45  Job [id number]  FB/

## 2023-06-09 NOTE — ED INITIAL ASSESSMENT (HPI)
Pt to ED via EMS from home for hematuria noted to indwelling eddy that was inserted by home health nurse prior to arrival. Denies trauma. Denies fever. Denies thinners or asa. Eddy draining red colored urine to standard drainage bag.

## 2023-06-09 NOTE — ED QUICK NOTES
The eddy that was in place upon arrival removed per Dr [employer] order. Balloon deflated and copius amount of blood draining to right hypospadias opening. MD aware and at bedside. Three way eddy inserted with sterile technique x1 attempt. MD at bedside  Blood oozing around eddy to right hypospadies opening. Thin bloody urine noted upon return to drainage bag. Draining well. No clots noted at this time. Pt cleaned up and put in position of comfort. Warm blankets applied. Cardiac monitor and continuous pulse oximetry on. Will continue to monitor.

## 2023-06-10 ENCOUNTER — TELEPHONE (OUTPATIENT)
Dept: FAMILY MEDICINE CLINIC | Facility: CLINIC | Age: 78
End: 2023-06-10

## 2023-06-10 VITALS
HEART RATE: 55 BPM | WEIGHT: 151 LBS | RESPIRATION RATE: 17 BRPM | DIASTOLIC BLOOD PRESSURE: 54 MMHG | BODY MASS INDEX: 27 KG/M2 | SYSTOLIC BLOOD PRESSURE: 115 MMHG | OXYGEN SATURATION: 98 % | TEMPERATURE: 98 F

## 2023-06-10 LAB
ANION GAP SERPL CALC-SCNC: 6 MMOL/L (ref 0–18)
BASOPHILS # BLD AUTO: 0.08 X10(3) UL (ref 0–0.2)
BASOPHILS NFR BLD AUTO: 0.9 %
BUN BLD-MCNC: 17 MG/DL (ref 7–18)
BUN/CREAT SERPL: 20.7 (ref 10–20)
CALCIUM BLD-MCNC: 8.9 MG/DL (ref 8.5–10.1)
CHLORIDE SERPL-SCNC: 111 MMOL/L (ref 98–112)
CO2 SERPL-SCNC: 24 MMOL/L (ref 21–32)
CREAT BLD-MCNC: 0.82 MG/DL
DEPRECATED RDW RBC AUTO: 46.4 FL (ref 35.1–46.3)
EOSINOPHIL # BLD AUTO: 0.12 X10(3) UL (ref 0–0.7)
EOSINOPHIL NFR BLD AUTO: 1.3 %
ERYTHROCYTE [DISTWIDTH] IN BLOOD BY AUTOMATED COUNT: 14.1 % (ref 11–15)
GFR SERPLBLD BASED ON 1.73 SQ M-ARVRAT: 90 ML/MIN/1.73M2 (ref 60–?)
GLUCOSE BLD-MCNC: 119 MG/DL (ref 70–99)
GLUCOSE BLDC GLUCOMTR-MCNC: 100 MG/DL (ref 70–99)
GLUCOSE BLDC GLUCOMTR-MCNC: 93 MG/DL (ref 70–99)
GLUCOSE BLDC GLUCOMTR-MCNC: 99 MG/DL (ref 70–99)
HCT VFR BLD AUTO: 40 %
HGB BLD-MCNC: 12.6 G/DL
IMM GRANULOCYTES # BLD AUTO: 0.03 X10(3) UL (ref 0–1)
IMM GRANULOCYTES NFR BLD: 0.3 %
LYMPHOCYTES # BLD AUTO: 2.72 X10(3) UL (ref 1–4)
LYMPHOCYTES NFR BLD AUTO: 28.9 %
MCH RBC QN AUTO: 28.4 PG (ref 26–34)
MCHC RBC AUTO-ENTMCNC: 31.5 G/DL (ref 31–37)
MCV RBC AUTO: 90.1 FL
MONOCYTES # BLD AUTO: 0.92 X10(3) UL (ref 0.1–1)
MONOCYTES NFR BLD AUTO: 9.8 %
NEUTROPHILS # BLD AUTO: 5.53 X10 (3) UL (ref 1.5–7.7)
NEUTROPHILS # BLD AUTO: 5.53 X10(3) UL (ref 1.5–7.7)
NEUTROPHILS NFR BLD AUTO: 58.8 %
OSMOLALITY SERPL CALC.SUM OF ELEC: 295 MOSM/KG (ref 275–295)
PLATELET # BLD AUTO: 185 10(3)UL (ref 150–450)
POTASSIUM SERPL-SCNC: 4.3 MMOL/L (ref 3.5–5.1)
RBC # BLD AUTO: 4.44 X10(6)UL
SODIUM SERPL-SCNC: 141 MMOL/L (ref 136–145)
WBC # BLD AUTO: 9.4 X10(3) UL (ref 4–11)

## 2023-06-10 PROCEDURE — 99239 HOSP IP/OBS DSCHRG MGMT >30: CPT | Performed by: HOSPITALIST

## 2023-06-10 PROCEDURE — 99231 SBSQ HOSP IP/OBS SF/LOW 25: CPT | Performed by: UROLOGY

## 2023-06-10 NOTE — DISCHARGE SUMMARY
[facility]    Discharge Summary    Felisha Mclaughlin Patient Status:  Inpatient    [birth year] MRN [id number]   Location [facility] 5SW/SE Attending Kyung Mercedes MD   Hosp Day # 1 PCP Vito Plata MD     Date of Admission: 2023 Disposition: [facility]     Date of Discharge: 06/10/23      Admitting Diagnosis: Gross hematuria [R31.0]    Hospital Discharge Diagnoses: Gross hematuria    Lace+ Score: 81  59-90 High Risk  29-58 Medium Risk  0-28   Low Risk. TCM Follow-Up Recommendation:  LACE > 58: High Risk of readmission after discharge from the hospital.      Problem List: Patient Active Problem List:     Dementia with behavioral disturbance (HCC)     Essential hypertension     History of tongue cancer     Obesity (BMI 30-39. 9)     Vitamin B12 deficiency     Controlled type 2 diabetes mellitus with complication, without long-term current use of insulin (HCC)     Elevated PSA     Sepsis due to urinary tract infection ([address]     Severe sepsis (HCC)     Acute cystitis without hematuria     Acute kidney injury ([address]     Metabolic acidosis     Hyperglycemia     Altered mental status, unspecified altered mental status type     Poor intravenous access     Liver lesion, right lobe     Prostate enlargement     Renal calculi     Hydroureteronephrosis     Urinary tract infection without hematuria     Colon, diverticulosis     BPH with obstruction/lower urinary tract symptoms     Urinary tract infection associated with indwelling urethral catheter (HCC)     Urinary tract infection associated with indwelling urethral catheter, initial encounter ([address]     History of ESBL E. coli infection     Chronic indwelling Alejo catheter     Gross hematuria     Chronic retention of urine     Bloody urethral discharge     Erosion of urethra due to catheterization of urinary tract ([address]      Reason for Admission:   Gross hematuria    Physical Exam:   General appearance: alert, appears stated age and cooperative  Pulmonary:  clear to auscultation bilaterally  Cardiovascular: S1, S2 normal, no murmur, click, rub or gallop, regular rate and rhythm  Abdominal: soft, non-tender; bowel sounds normal; no masses,  no organomegaly  Extremities: extremities normal, atraumatic, no cyanosis or edema  Psychiatric: calm      History of Present Illness:   Per   Patient is a 79-year-old [facility] male with chronic indwelling Alejo catheter. He was sent today to the emergency department for evaluation of gross hematuria. CBC and chemistry are unremarkable. Urinalysis is still pending. He was started on CBI system. CT scan of the abdomen and pelvis showed no acute findings. Patient was started on IV Rocephin. He will be admitted to the hospital for further management. Hospital Course:   Admitted for gross hematuria  Seen by urology  Urine culture negative  CBI started hematuria resolved bleeding per urethra ceased  Okay for discharge per urology follow-up with outpatient urologist    Diabetes mellitus  Advanced dementia    Consultations:   Urology    Procedures: n/a    Complications: n/a    Discharge Condition: Good    Discharge Medications:      Discharge Medications      CONTINUE taking these medications      Instructions Prescription details   Accu-Chek Francine Plus Strp      Check sugars daily as needed dispense lancets as well   Quantity: 100 strip  Refills: 1     acetaminophen 500 MG Tabs  Commonly known as: Tylenol Extra Strength      Take 1 tablet (500 mg total) by mouth every 4 (four) hours as needed. Refills: 0     Diapers & Supplies Misc      Adult diapers   Quantity: 100 Device  Refills: 3     lisinopril 10 MG Tabs  Commonly known as: Zestril      Take 1 tablet (10 mg total) by mouth daily. Quantity: 90 tablet  Refills: 1     Melatonin 5 MG Tabs      Take 1 tablet (5 mg total) by mouth nightly.  For sleep   Refills: 0        STOP taking these medications sulfamethoxazole-trimethoprim -160 MG Tabs per tablet  Commonly known as: Bactrim DS               Follow up Visits: Follow-up with PCP in 1 week    Follow up Labs: n/a     Other Discharge Instructions:  Follow-up with urology as instructed    Karl Navarro MD  6/10/2023  3:37 PM    > 35 min

## 2023-06-10 NOTE — CM/SW NOTE
RN informed SW of pt's DC today and family unable to  pt. SW arranged [facility] transport to pt's home, [address]. Next available 0100 on 6/11/23. PCS form completed. SW arranged as back up - will need to cancel if family ends up picking up pt vs option below. SW spoke w/ CM/Roseanne, suggested ERCM to arrange and Uber/Lyft transport if pt is safe to step into a car. RN stated that pt is safe to go into a vehicle, but speaks only Greek. SW requested RN to call [facility] later to see if there's an earlier  time, otherwise please call [facility] at [phone] for further assistance.      [facility] [phone]    Amparo Ottor, [address] [facility]  (06/10/23, [id number])

## 2023-06-10 NOTE — TELEPHONE ENCOUNTER
Later entry:  I was paged by [address] [place] [address] on 6/9/2023 re orders. She mentioned that patient was discharged home from hospital after recent UTI. She stated there were no orders to change the catheter. Spoke to her and mentioned that patient has had multiple hospitalizations for UTI. He is followed by urology.   recommended to call urology office    Maylin verbalized understanding

## 2023-06-11 NOTE — PLAN OF CARE
Problem: Patient Centered Care  Goal: Patient preferences are identified and integrated in the patient's plan of care  Description: Interventions:  - What would you like us to know as we care for you?   - Provide timely, complete, and accurate information to patient/family  - Incorporate patient and family knowledge, values, beliefs, and cultural backgrounds into the planning and delivery of care  - Encourage patient/family to participate in care and decision-making at the level they choose  - Honor patient and family perspectives and choices  Outcome: Adequate for Discharge     Problem: Patient/Family Goals  Goal: Patient/Family Long Term Goal  Description: Patient's Long Term Goal:     Interventions:  -   - See additional Care Plan goals for specific interventions  Outcome: Adequate for Discharge  Goal: Patient/Family Short Term Goal  Description: Patient's Short Term Goal:     Interventions:   -   - See additional Care Plan goals for specific interventions  Outcome: Adequate for Discharge     Problem: Risk for Violence/Aggression  Goal: Absence of Violence/Aggression  Description: INTERVENTIONS:   - Identify precipitating factors for behavior   - Notify Charge RN/Provider   - Consider decreasing stimulation   - Consider distraction measures   - Consider discussion with provider regarding prn meds    - Consider SANDRINE (Moderate Risk only)   - Consider Code Support (High Risk only)   - Consider room safety checks   - Consider restraints  Outcome: Adequate for Discharge     Problem: Diabetes/Glucose Control  Goal: Glucose maintained within prescribed range  Description: INTERVENTIONS:  - Monitor Blood Glucose as ordered  - Assess for signs and symptoms of hyperglycemia and hypoglycemia  - Administer ordered medications to maintain glucose within target range  - Assess barriers to adequate nutritional intake and initiate nutrition consult as needed  - Instruct patient on self management of diabetes  Outcome: Adequate for Discharge

## 2023-06-13 ENCOUNTER — PATIENT OUTREACH (OUTPATIENT)
Dept: CASE MANAGEMENT | Age: 78
End: 2023-06-13

## 2023-06-13 DIAGNOSIS — R31.0 GROSS HEMATURIA: ICD-10-CM

## 2023-06-13 DIAGNOSIS — Z02.9 ENCOUNTERS FOR UNSPECIFIED ADMINISTRATIVE PURPOSE: ICD-10-CM

## 2023-06-27 ENCOUNTER — TELEPHONE (OUTPATIENT)
Dept: FAMILY MEDICINE CLINIC | Facility: CLINIC | Age: 78
End: 2023-06-27

## 2023-07-17 ENCOUNTER — HOSPITAL ENCOUNTER (EMERGENCY)
Facility: HOSPITAL | Age: 78
Discharge: HOME | End: 2023-07-17
Attending: EMERGENCY MEDICINE
Payer: MEDICAID

## 2023-07-17 VITALS
RESPIRATION RATE: 20 BRPM | TEMPERATURE: 98 F | BODY MASS INDEX: 30.82 KG/M2 | HEART RATE: 66 BPM | WEIGHT: 185 LBS | SYSTOLIC BLOOD PRESSURE: 146 MMHG | DIASTOLIC BLOOD PRESSURE: 82 MMHG | OXYGEN SATURATION: 96 % | HEIGHT: 65 IN

## 2023-07-17 DIAGNOSIS — Z97.8 FOLEY CATHETER IN PLACE: Primary | ICD-10-CM

## 2023-07-17 PROCEDURE — 99285 EMERGENCY DEPT VISIT HI MDM: CPT

## 2023-07-17 PROCEDURE — 99284 EMERGENCY DEPT VISIT MOD MDM: CPT

## 2023-07-17 RX ORDER — LIDOCAINE HYDROCHLORIDE 20 MG/ML
10 JELLY TOPICAL ONCE
Status: DISCONTINUED | OUTPATIENT
Start: 2023-07-17 | End: 2023-07-18

## 2023-07-17 NOTE — ED QUICK NOTES
German speaking only. Language line used for patient assessment. He is confused and combative, grabbing at staff and swinging fists and kicking when attempted to remove eddy. Able to remove 24fr eddy with assistance from 3 RNs. Unable to administer urojet do to patient's confusion. ED MD aware.

## 2023-07-17 NOTE — DISCHARGE INSTRUCTIONS
Patient was urinating easily in the emergency department so a catheter was not replaced. Patient should be sent back to the ER if he is having difficulty urinating once again. Otherwise please follow-up with the urology office in 1 to 2 days for reevaluation. Continue normal medications. Read all instructions.

## 2023-07-17 NOTE — ED QUICK NOTES
Voicemail left for patient's daughter's Theo Francis [phone]  And son Anu [facility] [phone]

## 2023-07-17 NOTE — ED INITIAL ASSESSMENT (HPI)
Brought by [facility] from home after urinary catheter was removed either by patient or accidentally. Azeri speaking only. No family present. Hx of dementia.

## 2023-07-17 NOTE — CM/SW NOTE
Spoke to patient daughter Edvin Peoples who states that she will send her brother to  the patient. She was unable to give an ETA.

## 2023-07-18 ENCOUNTER — HOSPITAL ENCOUNTER (EMERGENCY)
Facility: HOSPITAL | Age: 78
Discharge: HOME | End: 2023-07-18
Attending: EMERGENCY MEDICINE
Payer: MEDICAID

## 2023-07-18 VITALS
DIASTOLIC BLOOD PRESSURE: 65 MMHG | OXYGEN SATURATION: 96 % | HEIGHT: 63 IN | BODY MASS INDEX: 25.69 KG/M2 | WEIGHT: 145 LBS | TEMPERATURE: 98 F | HEART RATE: 75 BPM | SYSTOLIC BLOOD PRESSURE: 94 MMHG | RESPIRATION RATE: 20 BRPM

## 2023-07-18 DIAGNOSIS — Z76.89 ENCOUNTER FOR EVALUATION OF FOLEY CATHETER: Primary | ICD-10-CM

## 2023-07-18 PROCEDURE — 99285 EMERGENCY DEPT VISIT HI MDM: CPT

## 2023-07-18 PROCEDURE — 99283 EMERGENCY DEPT VISIT LOW MDM: CPT

## 2023-07-18 PROCEDURE — 51702 INSERT TEMP BLADDER CATH: CPT

## 2023-07-18 RX ORDER — LIDOCAINE HYDROCHLORIDE 20 MG/ML
10 JELLY TOPICAL ONCE
Status: COMPLETED | OUTPATIENT
Start: 2023-07-18 | End: 2023-07-18

## 2023-07-18 NOTE — DISCHARGE INSTRUCTIONS
Follow-up with your primary physician and urologist.  Return to the emergency department if any new problems develop.

## 2023-07-18 NOTE — ED QUICK NOTES
[facility] at bedside. Report given to [facility] EMT. Patient's family called and are aware of pt returning home.

## 2023-07-18 NOTE — ED QUICK NOTES
Spoke with patient's daughter, Carlene Cohen, no one able to pick patient up at this time. Daughter would like ambulance to provide transportation.

## 2023-07-18 NOTE — ED QUICK NOTES
Discharge summary reviewed with patient's daughter including follow up appointments. Advised to return to the Emergency Department with new or worsening symptoms. Family verbalized understanding. All questions answered at this time. Discharged home with steady gait.

## 2023-07-18 NOTE — ED INITIAL ASSESSMENT (HPI)
Patient arrived to ED from home via EMS for eddy placement. EMS reports patient was seen in ED yesterday after damaging eddy catheter. At that time, the eddy was removed. Today, PCP called and instructed patient to return to ED for eddy placement. Patient is ambulatory, has hx. of dementia and EMS believes patient speaks \"honduri\"     Patient changed into gown. Side rails up x2, call light within reach, blanket provided.

## 2023-07-19 NOTE — ED QUICK NOTES
Patient got a hold of patient's daughter, Ene Gary, who now stated that she is home and patient is okay to be transported back to patient's residence on [address] [facility]. in [address] [place] [address],  [address].

## 2023-07-19 NOTE — ED QUICK NOTES
Patient was discharged with [facility] to be transported to patient's home on [address] [place] in [address] [facility], [address] [facility]. Patient's daughter, David Chance, requested for patient to be transported via private ambulance and was aware of plan for patient to be transported back home. [facility] transported patient to patient's home and none of patient's family members answered the door nor answered their telephones when patient arrived. Patient was brought back to ED by [facility]. This RN contacted every number on patient's chart and each number did not answer or went straight to voicemail. Patient now resting on ED cart. Attempting to call family members again.

## 2023-07-19 NOTE — ED QUICK NOTES
[facility] @ bedside for patient transport. Report given to [facility] EMT. Family aware of patient discharge.

## 2023-09-27 ENCOUNTER — HOSPITAL ENCOUNTER (EMERGENCY)
Facility: HOSPITAL | Age: 78
Discharge: HOME | End: 2023-09-27
Attending: EMERGENCY MEDICINE
Payer: MEDICAID

## 2023-09-27 VITALS
HEART RATE: 68 BPM | OXYGEN SATURATION: 99 % | RESPIRATION RATE: 18 BRPM | SYSTOLIC BLOOD PRESSURE: 132 MMHG | DIASTOLIC BLOOD PRESSURE: 77 MMHG | TEMPERATURE: 98 F

## 2023-09-27 DIAGNOSIS — Z97.8 CHRONIC INDWELLING FOLEY CATHETER: ICD-10-CM

## 2023-09-27 DIAGNOSIS — T83.511A URINARY TRACT INFECTION ASSOCIATED WITH INDWELLING URETHRAL CATHETER, INITIAL ENCOUNTER: ICD-10-CM

## 2023-09-27 DIAGNOSIS — N39.0 URINARY TRACT INFECTION ASSOCIATED WITH INDWELLING URETHRAL CATHETER, INITIAL ENCOUNTER: ICD-10-CM

## 2023-09-27 DIAGNOSIS — R31.0 GROSS HEMATURIA: Primary | ICD-10-CM

## 2023-09-27 LAB
BILIRUB UR QL: NEGATIVE
COLOR UR: YELLOW
GLUCOSE UR-MCNC: NORMAL MG/DL
KETONES UR-MCNC: NEGATIVE MG/DL
LEUKOCYTE ESTERASE UR QL STRIP.AUTO: 500
PH UR: 5 [PH] (ref 5–8)
SP GR UR STRIP: 1.02 (ref 1–1.03)
UROBILINOGEN UR STRIP-ACNC: NORMAL
WBC #/AREA URNS AUTO: >50 /HPF

## 2023-09-27 PROCEDURE — 81001 URINALYSIS AUTO W/SCOPE: CPT | Performed by: EMERGENCY MEDICINE

## 2023-09-27 PROCEDURE — 51702 INSERT TEMP BLADDER CATH: CPT

## 2023-09-27 PROCEDURE — 99284 EMERGENCY DEPT VISIT MOD MDM: CPT

## 2023-09-27 PROCEDURE — 87086 URINE CULTURE/COLONY COUNT: CPT | Performed by: EMERGENCY MEDICINE

## 2023-09-27 PROCEDURE — 99285 EMERGENCY DEPT VISIT HI MDM: CPT

## 2023-09-27 PROCEDURE — 96372 THER/PROPH/DIAG INJ SC/IM: CPT

## 2023-09-27 RX ORDER — SULFAMETHOXAZOLE AND TRIMETHOPRIM 800; 160 MG/1; MG/1
1 TABLET ORAL 2 TIMES DAILY
Qty: 14 TABLET | Refills: 0 | Status: SHIPPED | OUTPATIENT
Start: 2023-09-27 | End: 2023-10-04

## 2023-09-27 RX ORDER — SULFAMETHOXAZOLE AND TRIMETHOPRIM 800; 160 MG/1; MG/1
1 TABLET ORAL ONCE
Status: COMPLETED | OUTPATIENT
Start: 2023-09-27 | End: 2023-09-27

## 2023-09-27 RX ORDER — NITROFURANTOIN 25; 75 MG/1; MG/1
100 CAPSULE ORAL 2 TIMES DAILY
Qty: 10 CAPSULE | Refills: 0 | Status: SHIPPED | OUTPATIENT
Start: 2023-09-27 | End: 2023-10-02

## 2023-09-27 RX ORDER — NITROFURANTOIN 25; 75 MG/1; MG/1
100 CAPSULE ORAL ONCE
Status: COMPLETED | OUTPATIENT
Start: 2023-09-27 | End: 2023-09-27

## 2023-09-27 RX ORDER — LIDOCAINE HYDROCHLORIDE 20 MG/ML
10 JELLY TOPICAL ONCE
Status: COMPLETED | OUTPATIENT
Start: 2023-09-27 | End: 2023-09-27

## 2023-09-27 NOTE — ED INITIAL ASSESSMENT (HPI)
Patient arrives from home via [facility] with complaints of hematuria. Per EMS, patient's catheter was last exchanged about 3 months ago. Hx dementia.

## 2023-09-27 NOTE — ED QUICK NOTES
Patient speaks Greenlandic. Language line used for triage but patient sometimes not responding and/or making sense to . Patient states he lives with his daughter. Attempted to call patient's daughter at number listed, no answer.

## 2023-11-07 ENCOUNTER — HOSPITAL ENCOUNTER (EMERGENCY)
Facility: HOSPITAL | Age: 78
Discharge: HOME | End: 2023-11-07
Attending: STUDENT IN AN ORGANIZED HEALTH CARE EDUCATION/TRAINING PROGRAM
Payer: MEDICAID

## 2023-11-07 VITALS
OXYGEN SATURATION: 100 % | TEMPERATURE: 98 F | DIASTOLIC BLOOD PRESSURE: 64 MMHG | HEART RATE: 66 BPM | WEIGHT: 143.31 LBS | BODY MASS INDEX: 26.37 KG/M2 | HEIGHT: 62 IN | SYSTOLIC BLOOD PRESSURE: 142 MMHG | RESPIRATION RATE: 20 BRPM

## 2023-11-07 DIAGNOSIS — T83.011A MALFUNCTION OF FOLEY CATHETER, INITIAL ENCOUNTER (HCC): ICD-10-CM

## 2023-11-07 DIAGNOSIS — N30.90 CYSTITIS: Primary | ICD-10-CM

## 2023-11-07 LAB
BILIRUB UR QL: NEGATIVE
COLOR UR: YELLOW
GLUCOSE UR-MCNC: NORMAL MG/DL
HYALINE CASTS #/AREA URNS AUTO: PRESENT /LPF
KETONES UR-MCNC: NEGATIVE MG/DL
LEUKOCYTE ESTERASE UR QL STRIP.AUTO: 500
PH UR: 5.5 [PH] (ref 5–8)
PROT UR-MCNC: 200 MG/DL
RBC #/AREA URNS AUTO: >10 /HPF
SP GR UR STRIP: 1.02 (ref 1–1.03)
UROBILINOGEN UR STRIP-ACNC: NORMAL
WBC #/AREA URNS AUTO: >50 /HPF
WBC CLUMPS UR QL AUTO: PRESENT /HPF

## 2023-11-07 PROCEDURE — 99285 EMERGENCY DEPT VISIT HI MDM: CPT

## 2023-11-07 PROCEDURE — 87086 URINE CULTURE/COLONY COUNT: CPT | Performed by: STUDENT IN AN ORGANIZED HEALTH CARE EDUCATION/TRAINING PROGRAM

## 2023-11-07 PROCEDURE — 87186 SC STD MICRODIL/AGAR DIL: CPT | Performed by: STUDENT IN AN ORGANIZED HEALTH CARE EDUCATION/TRAINING PROGRAM

## 2023-11-07 PROCEDURE — 99284 EMERGENCY DEPT VISIT MOD MDM: CPT

## 2023-11-07 PROCEDURE — 81001 URINALYSIS AUTO W/SCOPE: CPT | Performed by: STUDENT IN AN ORGANIZED HEALTH CARE EDUCATION/TRAINING PROGRAM

## 2023-11-07 PROCEDURE — 87088 URINE BACTERIA CULTURE: CPT | Performed by: STUDENT IN AN ORGANIZED HEALTH CARE EDUCATION/TRAINING PROGRAM

## 2023-11-07 RX ORDER — CEFDINIR 250 MG/5ML
300 POWDER, FOR SUSPENSION ORAL ONCE
Status: COMPLETED | OUTPATIENT
Start: 2023-11-07 | End: 2023-11-07

## 2023-11-07 RX ORDER — CEFDINIR 300 MG/1
300 CAPSULE ORAL 2 TIMES DAILY
Qty: 14 CAPSULE | Refills: 0 | Status: SHIPPED | OUTPATIENT
Start: 2023-11-07 | End: 2023-11-14

## 2023-11-07 NOTE — ED QUICK NOTES
This RN contacted daughter for discharge. Daughter would like patient to come to her house. This RN confirmed address as follows [address]. Daughter confirms proper address. Ambulance set up. 1715 next available.

## 2023-12-04 ENCOUNTER — APPOINTMENT (OUTPATIENT)
Dept: CT IMAGING | Facility: HOSPITAL | Age: 78
End: 2023-12-04
Attending: EMERGENCY MEDICINE
Payer: MEDICAID

## 2023-12-04 ENCOUNTER — HOSPITAL ENCOUNTER (EMERGENCY)
Facility: HOSPITAL | Age: 78
Discharge: HOME | End: 2023-12-04
Attending: EMERGENCY MEDICINE
Payer: MEDICAID

## 2023-12-04 ENCOUNTER — APPOINTMENT (OUTPATIENT)
Dept: PICC SERVICES | Facility: HOSPITAL | Age: 78
End: 2023-12-04
Attending: EMERGENCY MEDICINE
Payer: MEDICAID

## 2023-12-04 VITALS
RESPIRATION RATE: 15 BRPM | OXYGEN SATURATION: 100 % | HEART RATE: 61 BPM | TEMPERATURE: 98 F | DIASTOLIC BLOOD PRESSURE: 65 MMHG | SYSTOLIC BLOOD PRESSURE: 131 MMHG

## 2023-12-04 DIAGNOSIS — R19.7 DIARRHEA, UNSPECIFIED TYPE: ICD-10-CM

## 2023-12-04 DIAGNOSIS — N39.0 URINARY TRACT INFECTION WITHOUT HEMATURIA, SITE UNSPECIFIED: Primary | ICD-10-CM

## 2023-12-04 LAB
ANION GAP SERPL CALC-SCNC: 7 MMOL/L (ref 0–18)
BASOPHILS # BLD AUTO: 0.04 X10(3) UL (ref 0–0.2)
BASOPHILS NFR BLD AUTO: 0.7 %
BILIRUB UR QL: NEGATIVE
BUN BLD-MCNC: 14 MG/DL (ref 9–23)
BUN/CREAT SERPL: 16.3 (ref 10–20)
CALCIUM BLD-MCNC: 10 MG/DL (ref 8.7–10.4)
CHLORIDE SERPL-SCNC: 103 MMOL/L (ref 98–112)
CO2 SERPL-SCNC: 29 MMOL/L (ref 21–32)
COLOR UR: YELLOW
CREAT BLD-MCNC: 0.86 MG/DL
DEPRECATED RDW RBC AUTO: 45.2 FL (ref 35.1–46.3)
EGFRCR SERPLBLD CKD-EPI 2021: 89 ML/MIN/1.73M2 (ref 60–?)
EOSINOPHIL # BLD AUTO: 0.13 X10(3) UL (ref 0–0.7)
EOSINOPHIL NFR BLD AUTO: 2.1 %
ERYTHROCYTE [DISTWIDTH] IN BLOOD BY AUTOMATED COUNT: 14 % (ref 11–15)
GLUCOSE BLD-MCNC: 95 MG/DL (ref 70–99)
GLUCOSE UR-MCNC: NORMAL MG/DL
HCT VFR BLD AUTO: 48.7 %
HGB BLD-MCNC: 16.2 G/DL
IMM GRANULOCYTES # BLD AUTO: 0.02 X10(3) UL (ref 0–1)
IMM GRANULOCYTES NFR BLD: 0.3 %
KETONES UR-MCNC: NEGATIVE MG/DL
LEUKOCYTE ESTERASE UR QL STRIP.AUTO: 500
LYMPHOCYTES # BLD AUTO: 2.11 X10(3) UL (ref 1–4)
LYMPHOCYTES NFR BLD AUTO: 34.8 %
MAGNESIUM SERPL-MCNC: 2 MG/DL (ref 1.6–2.6)
MCH RBC QN AUTO: 28.9 PG (ref 26–34)
MCHC RBC AUTO-ENTMCNC: 33.3 G/DL (ref 31–37)
MCV RBC AUTO: 86.8 FL
MONOCYTES # BLD AUTO: 0.35 X10(3) UL (ref 0.1–1)
MONOCYTES NFR BLD AUTO: 5.8 %
NEUTROPHILS # BLD AUTO: 3.41 X10 (3) UL (ref 1.5–7.7)
NEUTROPHILS # BLD AUTO: 3.41 X10(3) UL (ref 1.5–7.7)
NEUTROPHILS NFR BLD AUTO: 56.3 %
OSMOLALITY SERPL CALC.SUM OF ELEC: 288 MOSM/KG (ref 275–295)
PH UR: 5.5 [PH] (ref 5–8)
PLATELET # BLD AUTO: 245 10(3)UL (ref 150–450)
POTASSIUM SERPL-SCNC: 3.4 MMOL/L (ref 3.5–5.1)
PROT UR-MCNC: 50 MG/DL
RBC # BLD AUTO: 5.61 X10(6)UL
RBC #/AREA URNS AUTO: >10 /HPF
SODIUM SERPL-SCNC: 139 MMOL/L (ref 136–145)
SP GR UR STRIP: 1.02 (ref 1–1.03)
UROBILINOGEN UR STRIP-ACNC: NORMAL
WBC # BLD AUTO: 6.1 X10(3) UL (ref 4–11)
WBC #/AREA URNS AUTO: >50 /HPF
WBC CLUMPS UR QL AUTO: PRESENT /HPF

## 2023-12-04 PROCEDURE — 87186 SC STD MICRODIL/AGAR DIL: CPT | Performed by: EMERGENCY MEDICINE

## 2023-12-04 PROCEDURE — 87086 URINE CULTURE/COLONY COUNT: CPT | Performed by: EMERGENCY MEDICINE

## 2023-12-04 PROCEDURE — 83735 ASSAY OF MAGNESIUM: CPT | Performed by: EMERGENCY MEDICINE

## 2023-12-04 PROCEDURE — 87077 CULTURE AEROBIC IDENTIFY: CPT | Performed by: EMERGENCY MEDICINE

## 2023-12-04 PROCEDURE — 81001 URINALYSIS AUTO W/SCOPE: CPT | Performed by: EMERGENCY MEDICINE

## 2023-12-04 PROCEDURE — 99285 EMERGENCY DEPT VISIT HI MDM: CPT

## 2023-12-04 PROCEDURE — 85025 COMPLETE CBC W/AUTO DIFF WBC: CPT | Performed by: EMERGENCY MEDICINE

## 2023-12-04 PROCEDURE — 80048 BASIC METABOLIC PNL TOTAL CA: CPT | Performed by: EMERGENCY MEDICINE

## 2023-12-04 PROCEDURE — 74177 CT ABD & PELVIS W/CONTRAST: CPT | Performed by: EMERGENCY MEDICINE

## 2023-12-04 PROCEDURE — 96360 HYDRATION IV INFUSION INIT: CPT

## 2023-12-04 PROCEDURE — 96361 HYDRATE IV INFUSION ADD-ON: CPT

## 2023-12-04 RX ORDER — CIPROFLOXACIN 500 MG/1
500 TABLET, FILM COATED ORAL 2 TIMES DAILY
Qty: 14 TABLET | Refills: 0 | Status: SHIPPED | OUTPATIENT
Start: 2023-12-04 | End: 2023-12-11

## 2023-12-04 RX ORDER — CIPROFLOXACIN 500 MG/1
500 TABLET, FILM COATED ORAL ONCE
Status: COMPLETED | OUTPATIENT
Start: 2023-12-04 | End: 2023-12-04

## 2023-12-04 NOTE — ED INITIAL ASSESSMENT (HPI)
Patient arrives to ER for evaluation of Diarrhea. Patient Hebrew speaking.  used. Patient has a catheter in place. A&Ox1. Patient at supposed baseline.

## 2023-12-04 NOTE — ED QUICK NOTES
Assumed care from previous Silvia. RN at bedside for ultrasound IV. Attempted to reach both daughter and son x1. Son's phone number is disconnected. Will attempt to call daughter again.

## 2023-12-04 NOTE — DISCHARGE INSTRUCTIONS
Please return to the emergency department for any worsening symptoms including but not limited to fevers of 100.4, abdominal pain, decreased desire to eat, weakness, numbness, losing stool/urine on yourself, blood in vomit/stool, chest pain, etc. Please follow with your primary care provider in the next few days. Please follow-up with your urologist regarding your indwelling Alejo catheter      The Emergency Department is not intended to be a substitute for an effort to provide complete medical care. The imaging, if any, have often been interpreted on a preliminary basis pending final reading by the radiologist. If your blood pressure was greater than 140/90, please have this blood pressure rechecked by your primary care provider wihtin the next few days. You will benefit from a further discussion of lifestyle modifications that include Weight Reduction - Dietary Sodium Restriction - Increased Physical Activity and Moderation in alcohol (ETOH) Consumption. If possible check your pressure at home and keep a blood pressure log to bring to your physician.

## 2023-12-04 NOTE — ED QUICK NOTES
[facility] here to take patient back home. Daughter made aware of patients discharge. Discussed patients discharge over the phone with the daughter.  Patient left ED in stable condition

## 2023-12-04 NOTE — ED QUICK NOTES
Patient started to have diarrhea yesterday. Continuing to have diarrhea today. Per daughter, she is also concerned about a possible UTI. Stating about two days ago his urine started turning pink. Confirmed with daughter about discharge plan. Patient to go home by ambulance to address listed in facesheet. Daughter to be called for any further updates.

## 2023-12-06 NOTE — PROGRESS NOTES
ED Culture Callback Results Review    Pharmacist reviewed culture results from ED visit . Final urine culture positive for e. coli. Patient was prescribed Ciprofloxacin (Cipro) on discharge. Current therapy is appropriate based on reported susceptibilities. No further intervention required at this time.         Robel Robledo PharmD  Emergency Medicine Pharmacist Specialist  12/06/23; 12:34 PM

## 2023-12-08 ENCOUNTER — HOSPITAL ENCOUNTER (EMERGENCY)
Facility: HOSPITAL | Age: 78
Discharge: HOME | End: 2023-12-09
Attending: EMERGENCY MEDICINE
Payer: MEDICAID

## 2023-12-08 DIAGNOSIS — T83.021A DISPLACEMENT OF FOLEY CATHETER, INITIAL ENCOUNTER (HCC): Primary | ICD-10-CM

## 2023-12-08 DIAGNOSIS — F03.911 AGITATION DUE TO DEMENTIA (HCC): ICD-10-CM

## 2023-12-08 PROCEDURE — 99284 EMERGENCY DEPT VISIT MOD MDM: CPT

## 2023-12-08 PROCEDURE — 96372 THER/PROPH/DIAG INJ SC/IM: CPT

## 2023-12-08 PROCEDURE — 99285 EMERGENCY DEPT VISIT HI MDM: CPT

## 2023-12-09 VITALS
BODY MASS INDEX: 26 KG/M2 | WEIGHT: 143.31 LBS | DIASTOLIC BLOOD PRESSURE: 90 MMHG | SYSTOLIC BLOOD PRESSURE: 144 MMHG | HEART RATE: 71 BPM | TEMPERATURE: 98 F | RESPIRATION RATE: 18 BRPM | OXYGEN SATURATION: 98 %

## 2023-12-09 LAB
GLUCOSE BLDC GLUCOMTR-MCNC: 93 MG/DL (ref 70–99)
GLUCOSE BLDC GLUCOMTR-MCNC: 97 MG/DL (ref 70–99)

## 2023-12-09 PROCEDURE — 82962 GLUCOSE BLOOD TEST: CPT

## 2023-12-09 RX ORDER — HALOPERIDOL 5 MG/ML
5 INJECTION INTRAMUSCULAR ONCE
Status: COMPLETED | OUTPATIENT
Start: 2023-12-09 | End: 2023-12-09

## 2023-12-09 RX ORDER — OLANZAPINE 10 MG/2ML
INJECTION, POWDER, FOR SOLUTION INTRAMUSCULAR
Status: COMPLETED
Start: 2023-12-09 | End: 2023-12-09

## 2023-12-09 RX ORDER — HALOPERIDOL 5 MG/ML
INJECTION INTRAMUSCULAR
Status: COMPLETED
Start: 2023-12-09 | End: 2023-12-09

## 2023-12-09 NOTE — ED QUICK NOTES
Patient's daughter in law called, [phone], states she will be home and transport can be arranged, unable to get patient from er, [facility]  [phone]

## 2023-12-09 NOTE — ED QUICK NOTES
Patient returned to ED via EMS. Presents calm and cooperative. No distress noted. Patient requesting water. Alejo catheter with bag attached and intact.

## 2023-12-09 NOTE — ED INITIAL ASSESSMENT (HPI)
Pt presents with eddy catheter dislodgement which occurred while walking at home. Unable to obtain further history r/t dementia and language barrier.

## 2023-12-21 ENCOUNTER — TELEPHONE (OUTPATIENT)
Dept: FAMILY MEDICINE CLINIC | Facility: CLINIC | Age: 78
End: 2023-12-21

## 2023-12-21 ENCOUNTER — MED REC SCAN ONLY (OUTPATIENT)
Dept: FAMILY MEDICINE CLINIC | Facility: CLINIC | Age: 78
End: 2023-12-21

## 2023-12-21 NOTE — TELEPHONE ENCOUNTER
Received order form from [facility]  for urinary incontinence supples order signed by Dr. Suzi Spangler and faxed back.

## 2023-12-29 ENCOUNTER — TELEPHONE (OUTPATIENT)
Dept: FAMILY MEDICINE CLINIC | Facility: CLINIC | Age: 78
End: 2023-12-29

## 2023-12-29 NOTE — TELEPHONE ENCOUNTER
Jaiden Lopez from [facility] calling to state patient was admitted to [facility] on 12/27/23, had eddy inserted, may be released today, will need nurse visit. Asking if Dr. Rupinder Estrada will manage home health and sign orders, asking for confirmation as soon as possible.

## 2023-12-30 NOTE — TELEPHONE ENCOUNTER
Patients daughter called and scheduled an appt for the first available.  Appt scheduled with  for 01/03/2024 @ 12:20pm

## 2023-12-30 NOTE — TELEPHONE ENCOUNTER
Teresa An from [facility] calling for update if Dr Rupinder Estrada will sign home health orders, they would like to see the pt tomorrow. Please advise. Dr Rupinder Estrada not available.

## 2023-12-30 NOTE — TELEPHONE ENCOUNTER
MICAH Ross from [facility] called back for an update. Informed her of Providers message below. Dr Dru Moran is back in the office on Tuesday. Noted that patient was last seen in the office on 10/17/2022. Also noted multiple ER visits in 2023. Felicia Hughes stated that she will call the family to set up a follow up appt with PCP office for next week.

## 2024-01-03 ENCOUNTER — TELEPHONE (OUTPATIENT)
Dept: SURGERY | Facility: CLINIC | Age: 79
End: 2024-01-03

## 2024-01-03 ENCOUNTER — OFFICE VISIT (OUTPATIENT)
Dept: FAMILY MEDICINE CLINIC | Facility: CLINIC | Age: 79
End: 2024-01-03

## 2024-01-03 VITALS
WEIGHT: 148 LBS | BODY MASS INDEX: 27.23 KG/M2 | DIASTOLIC BLOOD PRESSURE: 60 MMHG | HEIGHT: 62 IN | OXYGEN SATURATION: 100 % | HEART RATE: 81 BPM | SYSTOLIC BLOOD PRESSURE: 94 MMHG

## 2024-01-03 DIAGNOSIS — N36.8 EROSION OF URETHRA DUE TO CATHETERIZATION OF URINARY TRACT, SUBSEQUENT ENCOUNTER: Primary | ICD-10-CM

## 2024-01-03 DIAGNOSIS — Z97.8 CHRONIC INDWELLING FOLEY CATHETER: ICD-10-CM

## 2024-01-03 DIAGNOSIS — T83.89XD EROSION OF URETHRA DUE TO CATHETERIZATION OF URINARY TRACT, SUBSEQUENT ENCOUNTER: Primary | ICD-10-CM

## 2024-01-03 LAB
APPEARANCE: CLEAR
BILIRUBIN: NEGATIVE
GLUCOSE (URINE DIPSTICK): NEGATIVE MG/DL
KETONES (URINE DIPSTICK): NEGATIVE MG/DL
MULTISTIX LOT#: ABNORMAL NUMERIC
NITRITE, URINE: NEGATIVE
PH, URINE: 6 (ref 4.5–8)
PROTEIN (URINE DIPSTICK): 30 MG/DL
SPECIFIC GRAVITY: 1.01 (ref 1–1.03)
UROBILINOGEN,SEMI-QN: 0.2 MG/DL (ref 0–1.9)

## 2024-01-03 PROCEDURE — 81002 URINALYSIS NONAUTO W/O SCOPE: CPT | Performed by: FAMILY MEDICINE

## 2024-01-03 PROCEDURE — 99214 OFFICE O/P EST MOD 30 MIN: CPT | Performed by: FAMILY MEDICINE

## 2024-01-03 PROCEDURE — 3078F DIAST BP <80 MM HG: CPT | Performed by: FAMILY MEDICINE

## 2024-01-03 PROCEDURE — 3008F BODY MASS INDEX DOCD: CPT | Performed by: FAMILY MEDICINE

## 2024-01-03 PROCEDURE — 3074F SYST BP LT 130 MM HG: CPT | Performed by: FAMILY MEDICINE

## 2024-01-03 NOTE — TELEPHONE ENCOUNTER
Per office of Dr. Platt asking for RN to call pt, would like pt to be seen sooner than next available due to recurrent emergency room visit, offered appt with PA on 1/8 but asking if pt can be seen sooner. Please call pt thank you.

## 2024-01-03 NOTE — TELEPHONE ENCOUNTER
Noted.     Future Appointments   Date Time Provider Department Center   1/9/2024 11:00 AM [facility]

## 2024-01-03 NOTE — PROGRESS NOTES
Jose Eduardo Padilla is a 78 year old male      CC:    Chief Complaint   Patient presents with    ER F/U     On  Blood in the catheter.   Daughter also states ER states pt has a split in private area.          HPI:   Jose Eduardo is a 79yo M with mmp including hx of dementia, BPH and chronic urinary retention seen for ED F/u for blood in eddy bag.   Per daughter walks up and down stairs and doesn't hold onto the bag  Patient lives with son and daughter is not aware of when patient's last urology appointment was.  Per chart review patient has been seen by urology while admitted in the hospital, however her last outpatient appointment was in 2022.  Per daughter has not been complaining of any increased dysuria.  Patient also denies      ROS:   As above    History:  Past Medical History:   Diagnosis Date    BPH (benign prostatic hyperplasia)     Calculus of kidney     Colon, diverticulosis     Dementia (HCC)     Diabetes (HCC)     Diabetes mellitus type 2, controlled (HCC)     High blood pressure     Incontinence     Incontinence of feces 2020    Prediabetes     borderline    Recurrent UTI     Tongue cancer (HCC)     Vitamin B12 deficiency       Past Surgical History:   Procedure Laterality Date    OTHER SURGICAL HISTORY      tongue cancer surgery, [place], no radiation/ chemo    TONGUE AND MOUTH SURG UNLISTED      To remove Tongue CA '11      Family History   Problem Relation Age of Onset    Cancer Mother     Cancer Sister       Family Status   Relation Status    Fa     Mo         liver cancer     Sis         stomach cancer       Social History     Socioeconomic History    Marital status:    Tobacco Use    Smoking status: Never    Smokeless tobacco: Never   Vaping Use    Vaping Use: Never used   Substance and Sexual Activity    Alcohol use: No    Drug use: No     Social Determinants of Health     Financial Resource Strain: Low Risk  (2023)    Financial Resource  Strain     Difficulty of Paying Living Expenses: Not very hard     Med Affordability: No   Transportation Needs: No Transportation Needs (5/17/2023)    Transportation Needs     Lack of Transportation: No          Current Meds:  Current Outpatient Medications   Medication Sig Dispense Refill    lisinopril 10 MG Oral Tab Take 1 tablet (10 mg total) by mouth daily. 90 tablet 1    acetaminophen 500 MG Oral Tab Take 1 tablet (500 mg total) by mouth every 4 (four) hours as needed.  0    melatonin 5 MG Oral Tab Take 1 tablet (5 mg total) by mouth nightly. For sleep      Glucose Blood (ACCU-CHEK ABRAM PLUS) In Vitro Strip Check sugars daily as needed dispense lancets as well 100 strip 1    Diapers & Supplies Does not apply Misc Adult diapers 100 Device 3      Allergies:  No Known Allergies           Vitals: BP 94/60   Pulse 81   Ht 5' 2\" (1.575 m)   Wt 148 lb (67.1 kg)   SpO2 100%   BMI 27.07 kg/m²           Physical Exam:  Physical Exam  Constitutional:       General: He is not in acute distress.     Appearance: He is not ill-appearing.   Genitourinary:     Comments: + severe urethral erosion   + hematuria noted in eddy and bag  discomfort with evaluation of gu region  Neurological:      Mental Status: He is alert.          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  Urine dip equivocal.  Will send for urine culture, however low suspicion for UTI given no other symptoms.  Suspect that hematuria is due to indwelling Eddy catheter.  Appointment made for follow-up with urologist NP 1/9/23.    Home health orders placed.        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

## 2024-01-08 ENCOUNTER — TELEPHONE (OUTPATIENT)
Dept: FAMILY MEDICINE CLINIC | Facility: CLINIC | Age: 79
End: 2024-01-08

## 2024-01-08 NOTE — TELEPHONE ENCOUNTER
Jose Eduardo from residential home health called, pt was admitted to home health services as of today, and wants to very catheter size.

## 2024-01-09 ENCOUNTER — OFFICE VISIT (OUTPATIENT)
Dept: SURGERY | Facility: CLINIC | Age: 79
End: 2024-01-09

## 2024-01-09 DIAGNOSIS — Z97.8 CHRONIC INDWELLING FOLEY CATHETER: Primary | ICD-10-CM

## 2024-01-09 PROCEDURE — 99213 OFFICE O/P EST LOW 20 MIN: CPT | Performed by: PHYSICIAN ASSISTANT

## 2024-01-09 NOTE — PROGRESS NOTES
Jose Eduardo Daniel Padilla is a 78 year old male.    HPI:   No chief complaint on file.      Mr. Juanita Padilla is a 78 year old male with severe dementia. He accompanied by his grandson Tito Grimaldo, who states that he is the power of  for his grandfather. However he has no documentation supporting this. Tito handed me a document that was intended to support his claim as POA, however the patient's name was not listed nor was Tito's. Review of records show that obtaining a POA has been a challenge. Family members have not been able to provide supporting documentation. The patient is unable to communicate with me to tell me who is in the room. He does respond to his name.   Katiuska was present in the room. She advised that per [place] surrogacy act, we can obtain a history from the grandson however no decisions can be made.     Grandson reports the patient has been to the ER multiple time for catheter related issues. He reports patient is not good about keeping eddy cath on leg or holding it. It will drag on the floor. In the ER they were advised to go to a urologist to discuss SPC placement.     Tito reports patient lives with his son. Colten Grimaldo reports his mother is patient's daughter.       HISTORY:  Past Medical History:   Diagnosis Date    BPH (benign prostatic hyperplasia)     Calculus of kidney     Colon, diverticulosis     Dementia (HCC)     Diabetes (HCC)     Diabetes mellitus type 2, controlled (HCC)     High blood pressure     Incontinence     Incontinence of feces 1/14/2020    Prediabetes     borderline    Recurrent UTI     Tongue cancer (HCC)     Vitamin B12 deficiency       Past Surgical History:   Procedure Laterality Date    OTHER SURGICAL HISTORY  2011    tongue cancer surgery, [place], no radiation/ chemo    TONGUE AND MOUTH SURG UNLISTED      To remove Tongue CA '11      Family History   Problem Relation Age of Onset    Cancer Mother     Cancer Sister       Social History:   Social  History     Socioeconomic History    Marital status:    Tobacco Use    Smoking status: Never    Smokeless tobacco: Never   Vaping Use    Vaping Use: Never used   Substance and Sexual Activity    Alcohol use: No    Drug use: No     Social Determinants of Health     Financial Resource Strain: Low Risk  (5/17/2023)    Financial Resource Strain     Difficulty of Paying Living Expenses: Not very hard     Med Affordability: No   Transportation Needs: No Transportation Needs (5/17/2023)    Transportation Needs     Lack of Transportation: No        Medications (Active prior to today's visit):  Current Outpatient Medications   Medication Sig Dispense Refill    lisinopril 10 MG Oral Tab Take 1 tablet (10 mg total) by mouth daily. 90 tablet 1    acetaminophen 500 MG Oral Tab Take 1 tablet (500 mg total) by mouth every 4 (four) hours as needed.  0    melatonin 5 MG Oral Tab Take 1 tablet (5 mg total) by mouth nightly. For sleep      Glucose Blood (ACCU-CHEK ABRAM PLUS) In Vitro Strip Check sugars daily as needed dispense lancets as well 100 strip 1    Diapers & Supplies Does not apply Misc Adult diapers 100 Device 3       Allergies:  No Known Allergies      ROS:   Review of Systems   Unable to perform ROS: Dementia        PHYSICAL EXAM:   Physical exam not completed as I do not have consent.      ASSESSMENT/PLAN:   Assessment   No diagnosis found.    Recommend:  Advised Tito to provide a POA, Once we have that on file we can proceed with a more detailed visit and outline a plan. Tito is not even listed as an alternate contact on patient's file. Discussed once completed we can even do a televisit. Tito advises he will bring in POA paper work tomorrow.          Orders This Visit:  No orders of the defined types were placed in this encounter.      Meds This Visit:  Requested Prescriptions      No prescriptions requested or ordered in this encounter       Imaging & Referrals:  None   Robert Partida PA-C   January 09, 2024

## 2024-01-09 NOTE — PROGRESS NOTES
One daughter, Luli, came to [facility] first and has sponsored rest of her 8-9 siblings, some family issues. I think daughter is overwhelmed with taking care of elderly father and mother  Mother I believe lives with daughter and I think father , the patient with another family member.  They usually come late to appointments and POA has not been addressed for that reason . He has been in and out of hospitals missed a few appointments with me   Saw my colleague recently.   Hopefully POA papers can be resolved soon   I wonder this must have been addressed at the [facility]

## 2024-01-11 NOTE — TELEPHONE ENCOUNTER
Received  order # [id number] Home Health and certification plan of care order signed by Dr. Plata and faxed back.

## 2024-01-25 ENCOUNTER — TELEPHONE (OUTPATIENT)
Dept: FAMILY MEDICINE CLINIC | Facility: CLINIC | Age: 79
End: 2024-01-25

## 2024-01-25 NOTE — TELEPHONE ENCOUNTER
Received page from [facility] Jose Eduardo Sanchez  He mentions he was going to go see the patient today.  He states the daughter stated that there was blood coming out of his catheter.  Patient may be agitated.      Reviewed with home health that patient has been in the hospital/ emergency room multiple times for this.    He needs close follow-up with urology.  I did notice he went to see urology recently but no examination was done as no consent could be obtained.  Patient has dementia.  Please inform patient and family that he needs POA to be done    If bleeding worsens or pain patient to go to ER.  HH verbalized understanding and will check on patient

## 2024-01-31 ENCOUNTER — OFFICE VISIT (OUTPATIENT)
Dept: FAMILY MEDICINE CLINIC | Facility: CLINIC | Age: 79
End: 2024-01-31

## 2024-01-31 VITALS
HEART RATE: 86 BPM | BODY MASS INDEX: 27.05 KG/M2 | WEIGHT: 147 LBS | SYSTOLIC BLOOD PRESSURE: 130 MMHG | TEMPERATURE: 98 F | HEIGHT: 62 IN | DIASTOLIC BLOOD PRESSURE: 80 MMHG

## 2024-01-31 DIAGNOSIS — I10 ESSENTIAL HYPERTENSION: ICD-10-CM

## 2024-01-31 DIAGNOSIS — Z71.89 ENCOUNTER FOR COUNSELING REGARDING ADVANCE DIRECTIVES: ICD-10-CM

## 2024-01-31 DIAGNOSIS — N13.8 BPH WITH OBSTRUCTION/LOWER URINARY TRACT SYMPTOMS: ICD-10-CM

## 2024-01-31 DIAGNOSIS — Z97.8 CHRONIC INDWELLING FOLEY CATHETER: Primary | ICD-10-CM

## 2024-01-31 DIAGNOSIS — E11.9 DIET-CONTROLLED DIABETES MELLITUS (HCC): ICD-10-CM

## 2024-01-31 DIAGNOSIS — R52 PAIN: ICD-10-CM

## 2024-01-31 DIAGNOSIS — F03.918 DEMENTIA WITH BEHAVIORAL DISTURBANCE (HCC): ICD-10-CM

## 2024-01-31 DIAGNOSIS — N40.1 BPH WITH OBSTRUCTION/LOWER URINARY TRACT SYMPTOMS: ICD-10-CM

## 2024-01-31 PROBLEM — T83.511A URINARY TRACT INFECTION ASSOCIATED WITH INDWELLING URETHRAL CATHETER (HCC): Status: RESOLVED | Noted: 2022-08-01 | Resolved: 2024-01-31

## 2024-01-31 PROBLEM — R31.0 GROSS HEMATURIA: Status: RESOLVED | Noted: 2023-06-09 | Resolved: 2024-01-31

## 2024-01-31 PROBLEM — R73.9 HYPERGLYCEMIA: Status: RESOLVED | Noted: 2022-06-11 | Resolved: 2024-01-31

## 2024-01-31 PROBLEM — N39.0 URINARY TRACT INFECTION ASSOCIATED WITH INDWELLING URETHRAL CATHETER, INITIAL ENCOUNTER (HCC): Status: RESOLVED | Noted: 2022-08-02 | Resolved: 2024-01-31

## 2024-01-31 PROBLEM — N39.0 URINARY TRACT INFECTION WITHOUT HEMATURIA: Status: RESOLVED | Noted: 2022-06-28 | Resolved: 2024-01-31

## 2024-01-31 PROBLEM — R41.82 ALTERED MENTAL STATUS, UNSPECIFIED ALTERED MENTAL STATUS TYPE: Status: RESOLVED | Noted: 2022-06-12 | Resolved: 2024-01-31

## 2024-01-31 PROBLEM — R65.20 SEVERE SEPSIS (HCC): Status: RESOLVED | Noted: 2022-05-03 | Resolved: 2024-01-31

## 2024-01-31 PROBLEM — N40.0 PROSTATE ENLARGEMENT: Status: RESOLVED | Noted: 2022-06-28 | Resolved: 2024-01-31

## 2024-01-31 PROBLEM — A41.9 SEPSIS DUE TO URINARY TRACT INFECTION (HCC): Status: RESOLVED | Noted: 2022-05-03 | Resolved: 2024-01-31

## 2024-01-31 PROBLEM — A41.9 SEVERE SEPSIS (HCC): Status: RESOLVED | Noted: 2022-05-03 | Resolved: 2024-01-31

## 2024-01-31 PROBLEM — N39.0 URINARY TRACT INFECTION ASSOCIATED WITH INDWELLING URETHRAL CATHETER  (HCC): Status: RESOLVED | Noted: 2022-08-01 | Resolved: 2024-01-31

## 2024-01-31 PROBLEM — E87.20 METABOLIC ACIDOSIS: Status: RESOLVED | Noted: 2022-06-11 | Resolved: 2024-01-31

## 2024-01-31 PROBLEM — T83.511A URINARY TRACT INFECTION ASSOCIATED WITH INDWELLING URETHRAL CATHETER  (HCC): Status: RESOLVED | Noted: 2022-08-01 | Resolved: 2024-01-31

## 2024-01-31 PROBLEM — N39.0 URINARY TRACT INFECTION ASSOCIATED WITH INDWELLING URETHRAL CATHETER, INITIAL ENCOUNTER  (HCC): Status: RESOLVED | Noted: 2022-08-02 | Resolved: 2024-01-31

## 2024-01-31 PROBLEM — T83.511A URINARY TRACT INFECTION ASSOCIATED WITH INDWELLING URETHRAL CATHETER, INITIAL ENCOUNTER (HCC): Status: RESOLVED | Noted: 2022-08-02 | Resolved: 2024-01-31

## 2024-01-31 PROBLEM — N39.0 SEPSIS DUE TO URINARY TRACT INFECTION  (HCC): Status: RESOLVED | Noted: 2022-05-03 | Resolved: 2024-01-31

## 2024-01-31 PROBLEM — N39.0 SEPSIS DUE TO URINARY TRACT INFECTION (HCC): Status: RESOLVED | Noted: 2022-05-03 | Resolved: 2024-01-31

## 2024-01-31 PROBLEM — A41.9 SEPSIS DUE TO URINARY TRACT INFECTION  (HCC): Status: RESOLVED | Noted: 2022-05-03 | Resolved: 2024-01-31

## 2024-01-31 PROBLEM — N17.9 ACUTE KIDNEY INJURY (HCC): Status: RESOLVED | Noted: 2022-06-11 | Resolved: 2024-01-31

## 2024-01-31 PROBLEM — N30.00 ACUTE CYSTITIS WITHOUT HEMATURIA: Status: RESOLVED | Noted: 2022-05-03 | Resolved: 2024-01-31

## 2024-01-31 PROBLEM — T83.511A URINARY TRACT INFECTION ASSOCIATED WITH INDWELLING URETHRAL CATHETER, INITIAL ENCOUNTER  (HCC): Status: RESOLVED | Noted: 2022-08-02 | Resolved: 2024-01-31

## 2024-01-31 PROBLEM — N39.0 URINARY TRACT INFECTION ASSOCIATED WITH INDWELLING URETHRAL CATHETER (HCC): Status: RESOLVED | Noted: 2022-08-01 | Resolved: 2024-01-31

## 2024-01-31 PROCEDURE — 3079F DIAST BP 80-89 MM HG: CPT | Performed by: FAMILY MEDICINE

## 2024-01-31 PROCEDURE — 3008F BODY MASS INDEX DOCD: CPT | Performed by: FAMILY MEDICINE

## 2024-01-31 PROCEDURE — 99213 OFFICE O/P EST LOW 20 MIN: CPT | Performed by: FAMILY MEDICINE

## 2024-01-31 PROCEDURE — 3075F SYST BP GE 130 - 139MM HG: CPT | Performed by: FAMILY MEDICINE

## 2024-01-31 RX ORDER — TAMSULOSIN HYDROCHLORIDE 0.4 MG/1
CAPSULE ORAL
COMMUNITY
Start: 2024-01-30 | End: 2024-01-31

## 2024-01-31 RX ORDER — DONEPEZIL HYDROCHLORIDE 10 MG/1
10 TABLET, FILM COATED ORAL NIGHTLY
Qty: 90 TABLET | Refills: 0 | Status: SHIPPED | OUTPATIENT
Start: 2024-01-31

## 2024-01-31 RX ORDER — TAMSULOSIN HYDROCHLORIDE 0.4 MG/1
0.4 CAPSULE ORAL DAILY
Qty: 90 CAPSULE | Refills: 0 | Status: SHIPPED | OUTPATIENT
Start: 2024-01-31

## 2024-01-31 RX ORDER — ACETAMINOPHEN 500 MG
500 TABLET ORAL EVERY 6 HOURS PRN
Qty: 100 TABLET | Refills: 1 | Status: SHIPPED | OUTPATIENT
Start: 2024-01-31

## 2024-01-31 RX ORDER — DONEPEZIL HYDROCHLORIDE 10 MG/1
10 TABLET, FILM COATED ORAL NIGHTLY
COMMUNITY
End: 2024-01-31

## 2024-01-31 NOTE — PROGRESS NOTES
HPI:    Patient ID: Jose Eduardo Padilla is a 78 year old male.      HPI    Chief Complaint   Patient presents with    Referral     For a new urologist     ER F/U       Wt Readings from Last 6 Encounters:   01/31/24 147 lb (66.7 kg)   01/03/24 148 lb (67.1 kg)   12/08/23 143 lb 4.8 oz (65 kg)   11/07/23 143 lb 4.8 oz (65 kg)   07/18/23 145 lb (65.8 kg)   07/17/23 185 lb (83.9 kg)     BP Readings from Last 3 Encounters:   01/31/24 130/80   01/03/24 94/60   12/09/23 144/90     Has an indwelling urinary catheter  Patient has dementia.  He has been tugging on his catheter.    Sees psychiatry but not able to get in soon\  would like refill on donazepil and also on tamsulosin while she looks for a new urologist    Review of Systems   Constitutional:  Negative for chills, fever and unexpected weight change.       /80   Pulse 86   Temp 97.5 °F (36.4 °C) (Temporal)   Ht 5' 2\" (1.575 m)   Wt 147 lb (66.7 kg)   BMI 26.89 kg/m²          Current Outpatient Medications   Medication Sig Dispense Refill    tamsulosin 0.4 MG Oral Cap Take 1 capsule (0.4 mg total) by mouth daily. 90 capsule 0    donepezil 10 MG Oral Tab Take 1 tablet (10 mg total) by mouth nightly. FURTHER REFILL PER PSYCHIATRY 90 tablet 0    acetaminophen 500 MG Oral Tab Take 1 tablet (500 mg total) by mouth every 6 (six) hours as needed for Pain or Fever. 100 tablet 1    melatonin 5 MG Oral Tab Take 1 tablet (5 mg total) by mouth nightly. For sleep      Glucose Blood (ACCU-CHEK ABRAM PLUS) In Vitro Strip Check sugars daily as needed dispense lancets as well 100 strip 1    Diapers & Supplies Does not apply Misc Adult diapers 100 Device 3     Allergies:No Known Allergies   PHYSICAL EXAM:     Chief Complaint   Patient presents with    Referral     For a new urologist     ER F/U      Physical Exam  Vitals reviewed.   Constitutional:       Comments: Sitting in chair, appears calm, not agitated  Foleys attached   Patient can get violent at times     Neurological:      Mental Status: He is alert.                ASSESSMENT/PLAN:     Encounter Diagnoses   Name Primary?    Chronic indwelling Laejo catheter Yes    Essential hypertension     BPH with obstruction/lower urinary tract symptoms     Diet-controlled diabetes mellitus (HCC)     Dementia with behavioral disturbance (HCC)     Pain     Encounter for counseling regarding advance directives        1. Chronic indwelling Alejo catheter  Unchanged   - Urology Referral - In Network    2. Essential hypertension  Off medication  Stable  Dc lisinopirl  Low salt diet     3. BPH with obstruction/lower urinary tract symptoms    - tamsulosin 0.4 MG Oral Cap; Take 1 capsule (0.4 mg total) by mouth daily.  Dispense: 90 capsule; Refill: 0  - Urology Referral - In Network    4. Diet-controlled diabetes mellitus (HCC)      5. Dementia with behavioral disturbance (HCC)  Follow up psychiatry   - donepezil 10 MG Oral Tab; Take 1 tablet (10 mg total) by mouth nightly. FURTHER REFILL PER PSYCHIATRY  Dispense: 90 tablet; Refill: 0    6. Pain  As needed use  - acetaminophen 500 MG Oral Tab; Take 1 tablet (500 mg total) by mouth every 6 (six) hours as needed for Pain or Fever.  Dispense: 100 tablet; Refill: 1    7. Encounter for counseling regarding advance directives  Discussed POA healthcare       No orders of the defined types were placed in this encounter.        The above note was creating using Dragon speech recognition technology. Please excuse any typos    Meds This Visit:  Requested Prescriptions     Signed Prescriptions Disp Refills    tamsulosin 0.4 MG Oral Cap 90 capsule 0     Sig: Take 1 capsule (0.4 mg total) by mouth daily.    donepezil 10 MG Oral Tab 90 tablet 0     Sig: Take 1 tablet (10 mg total) by mouth nightly. FURTHER REFILL PER PSYCHIATRY    acetaminophen 500 MG Oral Tab 100 tablet 1     Sig: Take 1 tablet (500 mg total) by mouth every 6 (six) hours as needed for Pain or Fever.       Imaging &  Referrals:  UROLOGY - INTERNAL       ID#[id number]

## 2024-02-01 ENCOUNTER — HOSPITAL ENCOUNTER (EMERGENCY)
Facility: HOSPITAL | Age: 79
Discharge: HOME | End: 2024-02-01
Attending: EMERGENCY MEDICINE
Payer: MEDICAID

## 2024-02-01 ENCOUNTER — NURSE TRIAGE (OUTPATIENT)
Dept: FAMILY MEDICINE CLINIC | Facility: CLINIC | Age: 79
End: 2024-02-01

## 2024-02-01 VITALS
TEMPERATURE: 97 F | HEART RATE: 61 BPM | RESPIRATION RATE: 17 BRPM | DIASTOLIC BLOOD PRESSURE: 68 MMHG | SYSTOLIC BLOOD PRESSURE: 121 MMHG | OXYGEN SATURATION: 98 %

## 2024-02-01 DIAGNOSIS — N30.01 ACUTE CYSTITIS WITH HEMATURIA: Primary | ICD-10-CM

## 2024-02-01 LAB
BILIRUB UR QL: NEGATIVE
COLOR UR: YELLOW
GLUCOSE UR-MCNC: NORMAL MG/DL
KETONES UR-MCNC: NEGATIVE MG/DL
LEUKOCYTE ESTERASE UR QL STRIP.AUTO: 500
PH UR: 5.5 [PH] (ref 5–8)
PROT UR-MCNC: 20 MG/DL
RBC #/AREA URNS AUTO: >10 /HPF
SP GR UR STRIP: 1.02 (ref 1–1.03)
UROBILINOGEN UR STRIP-ACNC: NORMAL
WBC #/AREA URNS AUTO: >50 /HPF

## 2024-02-01 PROCEDURE — 99284 EMERGENCY DEPT VISIT MOD MDM: CPT

## 2024-02-01 PROCEDURE — 51702 INSERT TEMP BLADDER CATH: CPT

## 2024-02-01 PROCEDURE — 81001 URINALYSIS AUTO W/SCOPE: CPT | Performed by: EMERGENCY MEDICINE

## 2024-02-01 RX ORDER — LIDOCAINE HYDROCHLORIDE 20 MG/ML
10 JELLY TOPICAL ONCE
Status: COMPLETED | OUTPATIENT
Start: 2024-02-01 | End: 2024-02-01

## 2024-02-01 RX ORDER — CEPHALEXIN 500 MG/1
500 CAPSULE ORAL ONCE
Status: COMPLETED | OUTPATIENT
Start: 2024-02-01 | End: 2024-02-01

## 2024-02-01 RX ORDER — CEPHALEXIN 500 MG/1
500 CAPSULE ORAL 2 TIMES DAILY
Qty: 14 CAPSULE | Refills: 0 | Status: SHIPPED | OUTPATIENT
Start: 2024-02-01 | End: 2024-02-08

## 2024-02-01 NOTE — ED INITIAL ASSESSMENT (HPI)
Patient to ed via ems, patient's home health rn attempted to changed patient's eddy when she noticed blood. Patient hx of dementia

## 2024-02-01 NOTE — ED PROVIDER NOTES
Patient Seen in: [facility] Emergency Department    History     Chief Complaint   Patient presents with    Bleeding       HPI    History is provided by patient/independent historian: Patient, EMS  78-year-old male with history of dementia, chronic indwelling Alejo catheter, brought in by EMS after home health nurse attempted to change patient's Alejo and noted blood at the meatus.  He has a history of dementia and is unable to provide further history.  EMS reports vitals were stable.  Patient has no complaints at this time.    History reviewed.   Past Medical History:   Diagnosis Date    BPH (benign prostatic hyperplasia)     Calculus of kidney     Colon, diverticulosis     Dementia (HCC)     Diabetes (HCC)     Diabetes mellitus type 2, controlled (HCC)     High blood pressure     Incontinence     Incontinence of feces 1/14/2020    Prediabetes     borderline    Recurrent UTI     Tongue cancer (HCC)     Vitamin B12 deficiency          History reviewed.   Past Surgical History:   Procedure Laterality Date    OTHER SURGICAL HISTORY  2011    tongue cancer surgery, [place], no radiation/ chemo    TONGUE AND MOUTH SURG UNLISTED      To remove Tongue CA '11         Home Medications reviewed :  (Not in a hospital admission)        History reviewed.   Social History     Socioeconomic History    Marital status:    Tobacco Use    Smoking status: Never    Smokeless tobacco: Never   Vaping Use    Vaping Use: Never used   Substance and Sexual Activity    Alcohol use: No    Drug use: No         ROS  Review of Systems   Unable to perform ROS: Dementia      All other pertinent organ systems are reviewed and are negative.      Physical Exam     ED Triage Vitals [02/01/24 0924]   /68   Pulse 78   Resp 20   Temp 97.1 °F (36.2 °C)   Temp src    SpO2 100 %   O2 Device      Vital signs reviewed.      Physical Exam  Vitals and nursing note reviewed.   Cardiovascular:      Pulses: Normal pulses.   Pulmonary:       Effort: No respiratory distress.   Abdominal:      General: There is no distension.   Genitourinary:     Comments: Urethral meatus open posteriorly secondary to chronic indwelling Eddy catheter site, blood at the meatus, no active bleeding, no testicular tenderness, no overlying laceration  Neurological:      Mental Status: He is alert.         ED Course       Labs:     Labs Reviewed   URINALYSIS, ROUTINE - Abnormal; Notable for the following components:       Result Value    Clarity Urine Ex.Turbid (*)     Blood Urine 3+ (*)     Protein Urine 20 (*)     Nitrite Urine 1+ (*)     Leukocyte Esterase Urine 500 (*)     WBC Urine >50 (*)     RBC Urine >10 (*)     Bacteria Urine 1+ (*)     All other components within normal limits         My EKG Interpretation:   As reviewed and Interpreted by me      Imaging Results Available and Reviewed while in ED:   No results found.      Decision rules/scores evaluated: none      Diagnostic labs/tests considered but not ordered: CT abd/pelvis,CBC, BMP, INR    ED Medications Administered:   Medications   cephalexin (Keflex) cap 500 mg (has no administration in time range)   lidocaine (Urojet) 2 % urethral jelly 10 mL (10 mL Urethral Given 2/1/24 [facility])                [facility]       Medical Decision Making      Differential Diagnosis: After obtaining the patient's history, performing the physical exam and reviewing the diagnostics, multiple initial diagnoses were considered based on the presenting problem including gastroenteritis, UTI, gi bleed, traumatic eddy insertion, supratherapeutic INR    External document review: I personally reviewed available external medical records for any recent pertinent discharge summaries, testing, and procedures - the findings are as follows: yesterday visit with Dr. Plata , telephone encounter today with concern patient was pulling on catheter    Complicating Factors: The patient already  has a past medical history of BPH (benign prostatic hyperplasia),  Calculus of kidney, Colon, diverticulosis, Dementia (HCC), Diabetes (HCC), Diabetes mellitus type 2, controlled ([facility]), High blood pressure, Incontinence, Incontinence of feces (1/14/2020), Prediabetes, Recurrent UTI, Tongue cancer ([facility]), and Vitamin B12 deficiency. to contribute to the complexity of this ED evaluation.    Procedures performed: none    Discussed management with physician/appropriate source: none    Considered admission/deescalation of care for: hematuria    Social determinants of health affecting patient care: none    Prescription medications considered: keflex, discussed continuing current medication regimen    The patient requires continuous monitoring for: blood at urethral meatus    Shared decision making: discussed possible admission      Disposition and Plan     Clinical Impression:  1. Acute cystitis with hematuria        Disposition:  Discharge    Follow-up:  Vito Plata MD  [address] [place] [id number] [phone]    Follow up        Medications Prescribed:  Current Discharge Medication List        START taking these medications    Details   cephalexin 500 MG Oral Cap Take 1 capsule (500 mg total) by mouth 2 (two) times daily for 7 days.  Qty: 14 capsule, Refills: 0

## 2024-02-01 NOTE — TELEPHONE ENCOUNTER
Jose Eduardo, RN [facility] health indicated that saw patient this morning to have eddy catheter changed. When RN pulled the catheter patient was actively bleeding through the penis so nurse call 911 and patient is being taken to [facility].

## 2024-02-02 NOTE — TELEPHONE ENCOUNTER
Patient was seen at [facility] ER yesterday.    Disposition and Plan      Clinical Impression:  1. Acute cystitis with hematuria          Disposition:  Discharge     Follow-up:  Vito Plata MD  [address] [place], [place] [id number] [phone]     Follow up           Medications Prescribed:  Current Discharge Medication List              START taking these medications     Details   cephalexin 500 MG Oral Cap Take 1 capsule (500 mg total) by mouth 2 (two) times daily for 7 days.  Qty: 14 capsule, Refills: 0

## 2024-02-21 ENCOUNTER — TELEPHONE (OUTPATIENT)
Dept: FAMILY MEDICINE CLINIC | Facility: CLINIC | Age: 79
End: 2024-02-21

## 2024-02-21 NOTE — TELEPHONE ENCOUNTER
Spoke to daughter in law. She is not on [facility] but verified patient's name and . Rn relayed we can take information but cannot give information.     She is not with patient and  the person with patient does not have a phone. She said that patient was recently in ER for cystitis with hematuria. He was given antibiotics.     Daughter in law said that his catheter has been bothering him lately. Also, yesterday and today he has been shaking, complaining of abdominal pain, weak and not eating. She said that there is no more blood in the urine and he seems to have adequate urine output. She does not think he has a fever.     Rn relayed that  due to not being able to get updates from someone with patient, it would be recommended to bring him back to ER if he is still having these symptoms.     When she  goes home, she will check on him again but is asking for Dr. Plata's thoughts.     Dr. Plata, apologies, caller did not have a lot of information and was not with the patient. Patient with shaking, weakness and abdominal pain. Rn recommended ER but daughter in law asking if that would be your recommendation.

## 2024-03-01 ENCOUNTER — TELEPHONE (OUTPATIENT)
Dept: FAMILY MEDICINE CLINIC | Facility: CLINIC | Age: 79
End: 2024-03-01

## 2024-03-01 NOTE — TELEPHONE ENCOUNTER
Message # [id number]         2024 11:06a   [TRUONG]  To:  From:  KIMBERLY Heath MD:  Phone#:  ----------------------------------------------------------------------  [email or web address] [phone]; RE PT EVARISTO ALONZO;  45; RE DISCHARGE ORDERS FOR HOME HEALTH - CAN GEOFF JAY    Paged at number :  PAGE: [phone] at :  11:06

## 2024-03-04 ENCOUNTER — TELEPHONE (OUTPATIENT)
Dept: FAMILY MEDICINE CLINIC | Facility: CLINIC | Age: 79
End: 2024-03-04

## 2024-03-04 NOTE — TELEPHONE ENCOUNTER
HH RN is asking for HH discharge orders as insurance will not pay for subsequent visits. Please advise.

## 2024-03-14 ENCOUNTER — HOSPITAL ENCOUNTER (EMERGENCY)
Facility: HOSPITAL | Age: 79
Discharge: HOME | End: 2024-03-14
Attending: EMERGENCY MEDICINE
Payer: MEDICAID

## 2024-03-14 VITALS
RESPIRATION RATE: 16 BRPM | HEIGHT: 67 IN | TEMPERATURE: 99 F | DIASTOLIC BLOOD PRESSURE: 63 MMHG | WEIGHT: 147.69 LBS | OXYGEN SATURATION: 97 % | SYSTOLIC BLOOD PRESSURE: 135 MMHG | BODY MASS INDEX: 23.18 KG/M2 | HEART RATE: 65 BPM

## 2024-03-14 DIAGNOSIS — N39.0 URINARY TRACT INFECTION ASSOCIATED WITH INDWELLING URETHRAL CATHETER, INITIAL ENCOUNTER (HCC): ICD-10-CM

## 2024-03-14 DIAGNOSIS — T83.9XXA FOLEY CATHETER PROBLEM, INITIAL ENCOUNTER (HCC): Primary | ICD-10-CM

## 2024-03-14 DIAGNOSIS — T83.511A URINARY TRACT INFECTION ASSOCIATED WITH INDWELLING URETHRAL CATHETER, INITIAL ENCOUNTER (HCC): ICD-10-CM

## 2024-03-14 LAB
ALBUMIN SERPL-MCNC: 4 G/DL (ref 3.2–4.8)
ALBUMIN/GLOB SERPL: 1 {RATIO} (ref 1–2)
ALP LIVER SERPL-CCNC: 86 U/L
ALT SERPL-CCNC: 7 U/L
ANION GAP SERPL CALC-SCNC: 5 MMOL/L (ref 0–18)
AST SERPL-CCNC: 11 U/L (ref ?–34)
BASOPHILS # BLD AUTO: 0.05 X10(3) UL (ref 0–0.2)
BASOPHILS NFR BLD AUTO: 0.5 %
BILIRUB SERPL-MCNC: 0.5 MG/DL (ref 0.2–1.1)
BILIRUB UR QL: NEGATIVE
BUN BLD-MCNC: 20 MG/DL (ref 9–23)
BUN/CREAT SERPL: 21.5 (ref 10–20)
CALCIUM BLD-MCNC: 9.6 MG/DL (ref 8.7–10.4)
CHLORIDE SERPL-SCNC: 107 MMOL/L (ref 98–112)
CO2 SERPL-SCNC: 28 MMOL/L (ref 21–32)
COLOR UR: YELLOW
CREAT BLD-MCNC: 0.93 MG/DL
DEPRECATED RDW RBC AUTO: 46.6 FL (ref 35.1–46.3)
EGFRCR SERPLBLD CKD-EPI 2021: 84 ML/MIN/1.73M2 (ref 60–?)
EOSINOPHIL # BLD AUTO: 0.18 X10(3) UL (ref 0–0.7)
EOSINOPHIL NFR BLD AUTO: 1.8 %
ERYTHROCYTE [DISTWIDTH] IN BLOOD BY AUTOMATED COUNT: 14.5 % (ref 11–15)
GLOBULIN PLAS-MCNC: 4.2 G/DL (ref 2.8–4.4)
GLUCOSE BLD-MCNC: 111 MG/DL (ref 70–99)
GLUCOSE UR-MCNC: NORMAL MG/DL
HCT VFR BLD AUTO: 41.4 %
HGB BLD-MCNC: 13.1 G/DL
IMM GRANULOCYTES # BLD AUTO: 0.03 X10(3) UL (ref 0–1)
IMM GRANULOCYTES NFR BLD: 0.3 %
KETONES UR-MCNC: NEGATIVE MG/DL
LEUKOCYTE ESTERASE UR QL STRIP.AUTO: 500
LYMPHOCYTES # BLD AUTO: 2.55 X10(3) UL (ref 1–4)
LYMPHOCYTES NFR BLD AUTO: 26.1 %
MCH RBC QN AUTO: 27.8 PG (ref 26–34)
MCHC RBC AUTO-ENTMCNC: 31.6 G/DL (ref 31–37)
MCV RBC AUTO: 87.9 FL
MONOCYTES # BLD AUTO: 0.7 X10(3) UL (ref 0.1–1)
MONOCYTES NFR BLD AUTO: 7.2 %
NEUTROPHILS # BLD AUTO: 6.25 X10 (3) UL (ref 1.5–7.7)
NEUTROPHILS # BLD AUTO: 6.25 X10(3) UL (ref 1.5–7.7)
NEUTROPHILS NFR BLD AUTO: 64.1 %
NITRITE UR QL STRIP.AUTO: NEGATIVE
OSMOLALITY SERPL CALC.SUM OF ELEC: 293 MOSM/KG (ref 275–295)
PH UR: 5.5 [PH] (ref 5–8)
PLATELET # BLD AUTO: 286 10(3)UL (ref 150–450)
POTASSIUM SERPL-SCNC: 4.1 MMOL/L (ref 3.5–5.1)
PROT SERPL-MCNC: 8.2 G/DL (ref 5.7–8.2)
PROT UR-MCNC: 70 MG/DL
RBC # BLD AUTO: 4.71 X10(6)UL
RBC #/AREA URNS AUTO: >10 /HPF
SODIUM SERPL-SCNC: 140 MMOL/L (ref 136–145)
SP GR UR STRIP: 1.02 (ref 1–1.03)
UROBILINOGEN UR STRIP-ACNC: NORMAL
WBC # BLD AUTO: 9.8 X10(3) UL (ref 4–11)
WBC #/AREA URNS AUTO: >50 /HPF
WBC CLUMPS UR QL AUTO: PRESENT /HPF

## 2024-03-14 PROCEDURE — 87186 SC STD MICRODIL/AGAR DIL: CPT | Performed by: EMERGENCY MEDICINE

## 2024-03-14 PROCEDURE — 87086 URINE CULTURE/COLONY COUNT: CPT | Performed by: EMERGENCY MEDICINE

## 2024-03-14 PROCEDURE — 85025 COMPLETE CBC W/AUTO DIFF WBC: CPT | Performed by: EMERGENCY MEDICINE

## 2024-03-14 PROCEDURE — 81001 URINALYSIS AUTO W/SCOPE: CPT | Performed by: EMERGENCY MEDICINE

## 2024-03-14 PROCEDURE — 51702 INSERT TEMP BLADDER CATH: CPT

## 2024-03-14 PROCEDURE — 36415 COLL VENOUS BLD VENIPUNCTURE: CPT

## 2024-03-14 PROCEDURE — 99284 EMERGENCY DEPT VISIT MOD MDM: CPT

## 2024-03-14 PROCEDURE — 80053 COMPREHEN METABOLIC PANEL: CPT | Performed by: EMERGENCY MEDICINE

## 2024-03-14 PROCEDURE — 87088 URINE BACTERIA CULTURE: CPT | Performed by: EMERGENCY MEDICINE

## 2024-03-14 RX ORDER — CIPROFLOXACIN 500 MG/1
500 TABLET, FILM COATED ORAL 2 TIMES DAILY
Qty: 20 TABLET | Refills: 0 | Status: SHIPPED | OUTPATIENT
Start: 2024-03-14 | End: 2024-03-24

## 2024-03-14 NOTE — ED QUICK NOTES
Pt daughter informed of discharge instructions. [facility] notified for BLS transport back to home.

## 2024-03-15 NOTE — ED PROVIDER NOTES
Patient Seen in: [facility] Emergency Department    History     Chief Complaint   Patient presents with    Abdomen/Flank Pain       HPI    The patient presents to the ED via EMS for leaking on his indwelling Alejo.  Patient has had a chronic indwelling Alejo.  History of dementia.  No other complaints.    History reviewed.   Past Medical History:   Diagnosis Date    BPH (benign prostatic hyperplasia)     Calculus of kidney     Colon, diverticulosis     Dementia (HCC)     Diabetes (HCC)     Diabetes mellitus type 2, controlled (HCC)     High blood pressure     Incontinence     Incontinence of feces 1/14/2020    Prediabetes     borderline    Recurrent UTI     Tongue cancer (HCC)     Vitamin B12 deficiency        History reviewed.   Past Surgical History:   Procedure Laterality Date    OTHER SURGICAL HISTORY  2011    tongue cancer surgery, [place], no radiation/ chemo    TONGUE AND MOUTH SURG UNLISTED      To remove Tongue CA '11         Medications :  (Not in a hospital admission)       Family History   Problem Relation Age of Onset    Cancer Mother     Cancer Sister        Smoking Status:   Social History     Socioeconomic History    Marital status:    Tobacco Use    Smoking status: Never    Smokeless tobacco: Never   Vaping Use    Vaping Use: Never used   Substance and Sexual Activity    Alcohol use: No    Drug use: No       Constitutional and vital signs reviewed.      Social History and Family History elements reviewed from today, pertinent positives to the presenting problem noted.    Physical Exam     ED Triage Vitals [03/14/24 1548]   /78   Pulse 77   Resp 20   Temp 98.5 °F (36.9 °C)   Temp src Temporal   SpO2 100 %   O2 Device None (Room air)       All measures to prevent infection transmission during my interaction with the patient were taken. Handwashing was performed prior to and after the exam.  Stethoscope and any equipment used during my examination was cleaned with super  sani-cloth germicidal wipes following the exam.     Physical Exam  Vitals and nursing note reviewed.   Constitutional:       General: He is not in acute distress.  HENT:      Head: Normocephalic and atraumatic.   Eyes:      General:         Right eye: No discharge.         Left eye: No discharge.      Conjunctiva/sclera: Conjunctivae normal.   Neck:      Trachea: No tracheal deviation.   Cardiovascular:      Rate and Rhythm: Normal rate.   Pulmonary:      Effort: Pulmonary effort is normal. No respiratory distress.      Breath sounds: No stridor.   Abdominal:      General: There is no distension.      Palpations: Abdomen is soft.      Tenderness: There is no abdominal tenderness.      Hernia: No hernia is present.   Genitourinary:     Comments: Indwelling Catheter in place.  Slightly cloudy urine in bag.  Musculoskeletal:         General: No deformity.   Skin:     General: Skin is warm and dry.   Neurological:      Mental Status: He is alert.         ED Course        Labs Reviewed   COMP METABOLIC PANEL (14) - Abnormal; Notable for the following components:       Result Value    Glucose 111 (*)     BUN/CREA Ratio 21.5 (*)     ALT 7 (*)     All other components within normal limits   URINALYSIS WITH CULTURE REFLEX - Abnormal; Notable for the following components:    Clarity Urine Turbid (*)     Blood Urine 3+ (*)     Protein Urine 70 (*)     Leukocyte Esterase Urine 500 (*)     WBC Urine >50 (*)     RBC Urine >10 (*)     Squamous Epi. Cells Few (*)     WBC Clump Present (*)     All other components within normal limits   CBC W/ DIFFERENTIAL - Abnormal; Notable for the following components:    RDW-SD 46.6 (*)     All other components within normal limits   CBC WITH DIFFERENTIAL WITH PLATELET    Narrative:     The following orders were created for panel order CBC With Differential With Platelet.                  Procedure                               Abnormality         Status                                      ---------                               -----------         ------                                     CBC W/ DIFFERENTIAL[[id number]]          Abnormal            Final result                                                 Please view results for these tests on the individual orders.   URINE CULTURE, ROUTINE       As Interpreted by me    Imaging Results Available and Reviewed while in ED: No results found.  ED Medications Administered: Medications - No data to display      MDM     Vitals:    03/14/24 1548 03/14/24 1600 03/14/24 1615 03/14/24 1745   BP: 142/78 122/63 121/64 135/63   Pulse: 77 62 63 65   Resp: 20 16 16 16   Temp: 98.5 °F (36.9 °C)      TempSrc: Temporal      SpO2: 100% 98% 98% 97%   Weight: 67 kg      Height: 170.2 cm (5' 7\")        *I personally reviewed and interpreted all ED vitals.    Pulse Ox: 97%, Room air, Normal     Monitor Interpretation:   normal sinus rhythm as interpreted by me.  The cardiac monitor was ordered to monitor heart rate.    Differential Diagnosis/ Diagnostic Considerations: UTI, Alejo obstruction, other    Complicating Factors: The patient already has does not have any pertinent problems on file. to contribute to the complexity of this ED evaluation.    Medical Decision Making  The patient presents to the ED with possible blockage of his Alejo given leaking around the catheter today per family.  Catheter was replaced and urine sample taken from the new catheter shows evidence for infection.  Patient has a history of E. coli UTIs in the past.  No leukocytosis or signs for sepsis.  Will cover with Cipro which is worked well in the past.  Stable for discharge with outpatient follow-up.    Problems Addressed:  Alejo catheter problem, initial encounter (HCC): acute illness or injury  Urinary tract infection associated with indwelling urethral catheter, initial encounter (HCC): acute illness or injury    Amount and/or Complexity of Data Reviewed  Labs: ordered. Decision-making  details documented in ED Course.    Risk  Prescription drug management.        Condition upon leaving the department: Stable    Disposition and Plan     Clinical Impression:  1. Alejo catheter problem, initial encounter (HCC)    2. Urinary tract infection associated with indwelling urethral catheter, initial encounter ([facility])        Disposition:  Discharge    Follow-up:  Vito Plata MD  [address] [place] [address]  [phone]    Schedule an appointment as soon as possible for a visit in 3 day(s)        Medications Prescribed:  Discharge Medication List as of 3/14/2024  5:23 PM        START taking these medications    Details   ciprofloxacin 500 MG Oral Tab Take 1 tablet (500 mg total) by mouth 2 (two) times daily for 10 days., Normal, Disp-20 tablet, R-0

## 2024-03-16 RX ORDER — NITROFURANTOIN 25; 75 MG/1; MG/1
100 CAPSULE ORAL 2 TIMES DAILY
Qty: 14 CAPSULE | Refills: 0 | Status: SHIPPED | OUTPATIENT
Start: 2024-03-16 | End: 2024-03-23

## 2024-03-18 NOTE — PROGRESS NOTES
ED Culture Callback Results Review    Pharmacist reviewed culture results from ED visit .    Final urine culture positive for e. coli that is not susceptible to previously prescribed Ciprofloxacin (Cipro) therapy.     A new prescription for nitrofurantoin was electronically sent to [facility] as discussed with Dr. Chicas. Unable to reach patient after three attempts. A certified letter has been requested.    Patient's primary language is Mongolian. Communication was conducted via  [id number] from the language line.    Maira Griggs PharmD  Emergency Medicine Pharmacist Specialist  03/18/24; 5:34 PM

## 2024-04-13 ENCOUNTER — HOSPITAL ENCOUNTER (EMERGENCY)
Facility: HOSPITAL | Age: 79
Discharge: HOME | End: 2024-04-13
Attending: STUDENT IN AN ORGANIZED HEALTH CARE EDUCATION/TRAINING PROGRAM
Payer: MEDICAID

## 2024-04-13 VITALS
DIASTOLIC BLOOD PRESSURE: 77 MMHG | SYSTOLIC BLOOD PRESSURE: 135 MMHG | RESPIRATION RATE: 18 BRPM | OXYGEN SATURATION: 98 % | HEART RATE: 78 BPM

## 2024-04-13 DIAGNOSIS — T83.9XXA PROBLEM WITH FOLEY CATHETER, INITIAL ENCOUNTER (HCC): ICD-10-CM

## 2024-04-13 DIAGNOSIS — N39.0 URINARY TRACT INFECTION ASSOCIATED WITH INDWELLING URETHRAL CATHETER, INITIAL ENCOUNTER (HCC): Primary | ICD-10-CM

## 2024-04-13 DIAGNOSIS — T83.511A URINARY TRACT INFECTION ASSOCIATED WITH INDWELLING URETHRAL CATHETER, INITIAL ENCOUNTER (HCC): Primary | ICD-10-CM

## 2024-04-13 LAB
BILIRUB UR QL: NEGATIVE
COLOR UR: YELLOW
GLUCOSE UR-MCNC: NORMAL MG/DL
KETONES UR-MCNC: NEGATIVE MG/DL
LEUKOCYTE ESTERASE UR QL STRIP.AUTO: 500
PH UR: 7 [PH] (ref 5–8)
PROT UR-MCNC: 50 MG/DL
RBC #/AREA URNS AUTO: >10 /HPF
SP GR UR STRIP: 1.02 (ref 1–1.03)
UROBILINOGEN UR STRIP-ACNC: NORMAL
WBC #/AREA URNS AUTO: >50 /HPF

## 2024-04-13 PROCEDURE — 87086 URINE CULTURE/COLONY COUNT: CPT | Performed by: STUDENT IN AN ORGANIZED HEALTH CARE EDUCATION/TRAINING PROGRAM

## 2024-04-13 PROCEDURE — 99284 EMERGENCY DEPT VISIT MOD MDM: CPT

## 2024-04-13 PROCEDURE — 99285 EMERGENCY DEPT VISIT HI MDM: CPT

## 2024-04-13 PROCEDURE — 87186 SC STD MICRODIL/AGAR DIL: CPT | Performed by: STUDENT IN AN ORGANIZED HEALTH CARE EDUCATION/TRAINING PROGRAM

## 2024-04-13 PROCEDURE — 81001 URINALYSIS AUTO W/SCOPE: CPT | Performed by: STUDENT IN AN ORGANIZED HEALTH CARE EDUCATION/TRAINING PROGRAM

## 2024-04-13 PROCEDURE — 87088 URINE BACTERIA CULTURE: CPT | Performed by: STUDENT IN AN ORGANIZED HEALTH CARE EDUCATION/TRAINING PROGRAM

## 2024-04-13 RX ORDER — HALOPERIDOL 5 MG/ML
INJECTION INTRAMUSCULAR
Status: COMPLETED
Start: 2024-04-13 | End: 2024-04-13

## 2024-04-13 RX ORDER — CEPHALEXIN 500 MG/1
500 CAPSULE ORAL ONCE
Status: COMPLETED | OUTPATIENT
Start: 2024-04-13 | End: 2024-04-13

## 2024-04-13 RX ORDER — CEPHALEXIN 500 MG/1
500 CAPSULE ORAL 2 TIMES DAILY
Qty: 14 CAPSULE | Refills: 0 | Status: SHIPPED | OUTPATIENT
Start: 2024-04-13 | End: 2024-04-20

## 2024-04-13 NOTE — ED QUICK NOTES
Pt combative while trying to change eddy  Medicated per md order  Security at bedside to assist  Pt calm and cooperative at this time  Eddy in place  Draining clear yellow urine

## 2024-04-14 NOTE — ED PROVIDER NOTES
Patient Seen in: [facility] Emergency Department      History     Chief Complaint   Patient presents with    Cath Tube Problem     Stated Complaint: CATHETER PAIN    Subjective:   HPI    78-year-old male with history of BPH with indwelling Alejo dementia T2DM and recurrent UTIs presenting with concerns of groin pain.  Brought in by EMS from home.  Primarily Korean speaking.  Family called EMS because he would not let home health practitioners address his Alejo catheter, they are concerned he has a UTI or malfunctioning catheter.  No reported fevers or vomiting.  Due to dementia patient unable to precipitate significantly in history.    Objective:   Past Medical History:    BPH (benign prostatic hyperplasia)    Calculus of kidney    Colon, diverticulosis    Dementia (HCC)    Diabetes (HCC)    Diabetes mellitus type 2, controlled (HCC)    High blood pressure    Incontinence    Incontinence of feces    Prediabetes    borderline    Recurrent UTI    Tongue cancer (HCC)    Vitamin B12 deficiency              Past Surgical History:   Procedure Laterality Date    Other surgical history  2011    tongue cancer surgery, [place], no radiation/ chemo    Tongue and mouth surg unlisted      To remove Tongue CA '11                Social History     Socioeconomic History    Marital status:    Tobacco Use    Smoking status: Never    Smokeless tobacco: Never   Vaping Use    Vaping status: Never Used   Substance and Sexual Activity    Alcohol use: No    Drug use: No     Social Determinants of Health     Financial Resource Strain: Low Risk  (5/17/2023)    Financial Resource Strain     Difficulty of Paying Living Expenses: Not very hard     Med Affordability: No   Transportation Needs: No Transportation Needs (5/17/2023)    Transportation Needs     Lack of Transportation: No    Received from [facility]              Review of Systems    Positive for stated complaint: CATHETER PAIN  Other systems are as  noted in HPI.  Constitutional and vital signs reviewed.      All other systems reviewed and negative except as noted above.    Physical Exam     ED Triage Vitals [04/13/24 1834]   /77   Pulse 78   Resp 18   Temp    Temp src    SpO2 98 %   O2 Device None (Room air)       Current:/77   Pulse 78   Resp 18   SpO2 98%         Physical Exam    Constitutional: awake, alert, no sig distress  HENT: mmm, no lesions,  Neck: normal range of motion, no tenderness, supple.  Eyes: PERRL, EOMI, conjunctiva normal, no discharge. Sclera anicteric.  Cardiovascular: rr no murmur  Respiratory: Normal breath sounds, no respiratory distress, no wheezing, no chest tenderness.  GI: Bowel sounds normal, Soft, no tenderness, no masses, no pulsatile masses.  : No CVA tenderness.  Skin: Warm, dry, no erythema, no rash.  Musculoskeletal: Intact distal pulses, no edema, no tenderness, no cyanosis, no clubbing. Good range of motion in all major joints. No tenderness to palpation or major deformities noted. Back- No tenderness.  Neurologic: Alert & oriented x 3, normal motor function, normal sensory function, no focal deficits noted.  Psych: Calm, cooperative, nl affect        ED Course     Labs Reviewed   URINALYSIS WITH CULTURE REFLEX - Abnormal; Notable for the following components:       Result Value    Clarity Urine Turbid (*)     Blood Urine 3+ (*)     Protein Urine 50 (*)     Nitrite Urine 2+ (*)     Leukocyte Esterase Urine 500 (*)     WBC Urine >50 (*)     RBC Urine >10 (*)     Bacteria Urine 3+ (*)     Squamous Epi. Cells Few (*)     All other components within normal limits   URINE CULTURE, ROUTINE          ED Course as of 04/13/24 5978  ------------------------------------------------------------  Time: 04/13 1912  Comment: Last urine culture showed Ancef sensitive E. coli              MDM      78-year-old male presenting for groin pain, Alejo catheter evaluation.  Alejo appears obstructed.  It was exchanged at the  bedside.  He did require dose of Haldol for anxiolysis to facilitate Alejo catheter exchange.  UA may be representative UTI versus colonization.  Difficult to obtain history from patient to ascertain whether he has groin discomfort, so we will treat as UTI.  Return precautions and follow-up instructions were discussed with patient who voiced understanding and agreement the plan.  All questions were answered to patient satisfaction.                                 Medical Decision Making      Disposition and Plan     Clinical Impression:  1. Urinary tract infection associated with indwelling urethral catheter, initial encounter ([facility])    2. Problem with Alejo catheter, initial encounter ([facility])         Disposition:  Discharge  4/13/2024  7:13 pm    Follow-up:  Vito Plata MD  [address] [place] [id number] [phone]    Follow up in 2 day(s)      [facility] Emergency Department  [address] [facility] [address] [phone]  Follow up  As needed, If symptoms worsen    We recommend that you schedule follow up care with a primary care provider within the next three months to obtain basic health screening including reassessment of your blood pressure.      Medications Prescribed:  Discharge Medication List as of 4/13/2024  7:19 PM

## 2024-04-14 NOTE — DISCHARGE INSTRUCTIONS
You should return to the emergency department if you develop severe nausea and vomiting and  are unable to keep liquids down, if you develop severe back/flank or stomach pain, or if your  symptoms are not clearly improving at home. Some people with this infection are sent home on  a medication to numb the urinary tract. If you were sent home on this medication, do not be  alarmed if your urine turns dark orange or reddish, this is a safe side effect of the medication.

## 2024-04-15 NOTE — PROGRESS NOTES
ED Culture Callback Results Review    Pharmacist reviewed culture results from ED visit .    Final urine culture positive for e. coli. Patient was prescribed Cephalexin (Keflex) on discharge. Current therapy is appropriate based on reported susceptibilities. No further intervention required at this time.      Maira Griggs, PharmD  Emergency Medicine Pharmacist Specialist  04/15/24; 11:49 AM

## 2024-04-26 ENCOUNTER — HOSPITAL ENCOUNTER (EMERGENCY)
Facility: HOSPITAL | Age: 79
Discharge: HOME | End: 2024-04-26
Attending: EMERGENCY MEDICINE
Payer: MEDICAID

## 2024-04-26 VITALS
RESPIRATION RATE: 16 BRPM | DIASTOLIC BLOOD PRESSURE: 80 MMHG | BODY MASS INDEX: 23 KG/M2 | TEMPERATURE: 98 F | WEIGHT: 147.69 LBS | SYSTOLIC BLOOD PRESSURE: 137 MMHG | HEART RATE: 64 BPM | OXYGEN SATURATION: 100 %

## 2024-04-26 DIAGNOSIS — N39.0 URINARY TRACT INFECTION ASSOCIATED WITH INDWELLING URETHRAL CATHETER, INITIAL ENCOUNTER (HCC): Primary | ICD-10-CM

## 2024-04-26 DIAGNOSIS — T83.511A URINARY TRACT INFECTION ASSOCIATED WITH INDWELLING URETHRAL CATHETER, INITIAL ENCOUNTER (HCC): Primary | ICD-10-CM

## 2024-04-26 PROCEDURE — 99283 EMERGENCY DEPT VISIT LOW MDM: CPT

## 2024-04-26 RX ORDER — CEPHALEXIN 500 MG/1
500 CAPSULE ORAL 3 TIMES DAILY
Qty: 30 CAPSULE | Refills: 0 | Status: SHIPPED | OUTPATIENT
Start: 2024-04-26 | End: 2024-05-06

## 2024-04-26 NOTE — ED QUICK NOTES
This RN spoke w/ pts daughter, Renay, and updated daughter on DC instructions, dx, as well as ETA for BLS ambulance.  Daughter verbalized understanding and reported someone would be home for pt.

## 2024-04-26 NOTE — ED INITIAL ASSESSMENT (HPI)
Pt to ED via EMS for \"hematuria.\"  Pt w/ chronic Alejo.  Per EMS, family noticed blood in urine drainage bag.  Pt has no other medical complaints at this time.  Alejo in place, draining yudith colored urine.

## 2024-04-27 NOTE — ED PROVIDER NOTES
Patient Seen in: [facility] Emergency Department    History     Chief Complaint   Patient presents with    Cath Tube Problem       HPI    The patient presents to the ED via EMS after family noted some blood in his catheter bag.  Patient has a chronic Alejo that was changed less than 2 weeks ago.  Patient has no other complaints and denies any abdominal pain or fever.    History reviewed.   Past Medical History:    BPH (benign prostatic hyperplasia)    Calculus of kidney    Colon, diverticulosis    Dementia (HCC)    Diabetes (HCC)    Diabetes mellitus type 2, controlled (HCC)    High blood pressure    Incontinence    Incontinence of feces    Prediabetes    borderline    Recurrent UTI    Tongue cancer (HCC)    Vitamin B12 deficiency       History reviewed.   Past Surgical History:   Procedure Laterality Date    Other surgical history  2011    tongue cancer surgery, [place], no radiation/ chemo    Tongue and mouth surg unlisted      To remove Tongue CA '11         Medications :  (Not in a hospital admission)       Family History   Problem Relation Age of Onset    Cancer Mother     Cancer Sister        Smoking Status:   Social History     Socioeconomic History    Marital status:    Tobacco Use    Smoking status: Never    Smokeless tobacco: Never   Vaping Use    Vaping status: Never Used   Substance and Sexual Activity    Alcohol use: No    Drug use: No       Constitutional and vital signs reviewed.      Social History and Family History elements reviewed from today, pertinent positives to the presenting problem noted.    Physical Exam     ED Triage Vitals [04/26/24 1559]   /63   Pulse 74   Resp 16   Temp 98 °F (36.7 °C)   Temp src Temporal   SpO2 99 %   O2 Device None (Room air)       All measures to prevent infection transmission during my interaction with the patient were taken. Handwashing was performed prior to and after the exam.  Stethoscope and any equipment used during my examination was  cleaned with super sani-cloth germicidal wipes following the exam.     Physical Exam  Vitals and nursing note reviewed.   Constitutional:       General: He is not in acute distress.     Appearance: Normal appearance. He is well-developed.   HENT:      Head: Normocephalic and atraumatic.   Eyes:      General:         Right eye: No discharge.         Left eye: No discharge.      Conjunctiva/sclera: Conjunctivae normal.   Neck:      Trachea: No tracheal deviation.   Cardiovascular:      Rate and Rhythm: Normal rate.   Pulmonary:      Effort: Pulmonary effort is normal. No respiratory distress.      Breath sounds: No stridor.   Abdominal:      General: There is no distension.      Palpations: Abdomen is soft.      Tenderness: There is no abdominal tenderness.   Genitourinary:     Comments: Alejo catheter in appropriate position.  Clear urine in the tubing and bag.  No hematuria.  Musculoskeletal:         General: No deformity.   Skin:     General: Skin is warm and dry.   Neurological:      General: No focal deficit present.      Mental Status: He is alert. Mental status is at baseline.   Psychiatric:         Mood and Affect: Mood normal.         Behavior: Behavior normal.         ED Course      Labs Reviewed - No data to display    As Interpreted by me    Imaging Results Available and Reviewed while in ED: No results found.  ED Medications Administered: Medications - No data to display      MDM     Vitals:    04/26/24 1559 04/26/24 1715 04/26/24 1915   BP: 158/63  137/80   Pulse: 74 63 64   Resp: 16     Temp: 98 °F (36.7 °C)     TempSrc: Temporal     SpO2: 99% 98% 100%   Weight: 67 kg       *I personally reviewed and interpreted all ED vitals.    Pulse Ox: 100%, Room air, Normal     Differential Diagnosis/ Diagnostic Considerations: UTI, hematuria, other    Complicating Factors: The patient already has does not have any pertinent problems on file. to contribute to the complexity of this ED evaluation.    Medical  Decision Making  The patient presents to the ED after family noted blood in his catheter bag.  Patient well-appearing in the ED without symptoms.  Will cover with antibiotics given recent UTI and recommended urology follow-up and PCP follow-up.    Problems Addressed:  Urinary tract infection associated with indwelling urethral catheter, initial encounter (HCC): acute illness or injury    Amount and/or Complexity of Data Reviewed  Independent Historian: EMS     Details: EMS provided history details    Risk  Prescription drug management.        Condition upon leaving the department: Stable    Disposition and Plan     Clinical Impression:  1. Urinary tract infection associated with indwelling urethral catheter, initial encounter (HCC)        Disposition:  Discharge    Follow-up:  Vito Plata MD  [address] [place] [address]  [phone]    Schedule an appointment as soon as possible for a visit in 3 day(s)        Medications Prescribed:  Discharge Medication List as of 4/26/2024  5:27 PM

## 2024-05-17 ENCOUNTER — HOSPITAL ENCOUNTER (EMERGENCY)
Facility: HOSPITAL | Age: 79
Discharge: HOME | End: 2024-05-17
Attending: EMERGENCY MEDICINE

## 2024-05-17 VITALS
OXYGEN SATURATION: 99 % | RESPIRATION RATE: 22 BRPM | TEMPERATURE: 99 F | HEART RATE: 59 BPM | SYSTOLIC BLOOD PRESSURE: 107 MMHG | DIASTOLIC BLOOD PRESSURE: 85 MMHG

## 2024-05-17 DIAGNOSIS — T83.011A MALFUNCTION OF FOLEY CATHETER, INITIAL ENCOUNTER (HCC): Primary | ICD-10-CM

## 2024-05-17 PROCEDURE — 99283 EMERGENCY DEPT VISIT LOW MDM: CPT

## 2024-05-17 NOTE — ED INITIAL ASSESSMENT (HPI)
Patient arrives to ER for leaking eddy catheter. Patient here frequently for this problem. Patient recently finished abx for a UTI.

## 2024-05-17 NOTE — ED PROVIDER NOTES
Patient Seen in: [facility] Emergency Department      History     Chief Complaint   Patient presents with    Cath Tube Problem     Stated Complaint:     Subjective:   HPI    78-year-old male presents for evaluation for leaking Alejo catheter.  EMS reports that he was just here recently for the same and finished antibiotics for UTI.  Patient has no complaints.    Objective:   Past Medical History:    BPH (benign prostatic hyperplasia)    Calculus of kidney    Colon, diverticulosis    Dementia (HCC)    Diabetes (HCC)    Diabetes mellitus type 2, controlled (HCC)    High blood pressure    Incontinence    Incontinence of feces    Prediabetes    borderline    Recurrent UTI    Tongue cancer (HCC)    Vitamin B12 deficiency              Past Surgical History:   Procedure Laterality Date    Other surgical history  2011    tongue cancer surgery, [place], no radiation/ chemo    Tongue and mouth surg unlisted      To remove Tongue CA '11                Social History     Socioeconomic History    Marital status:    Tobacco Use    Smoking status: Never    Smokeless tobacco: Never   Vaping Use    Vaping status: Never Used   Substance and Sexual Activity    Alcohol use: No    Drug use: No     Social Determinants of Health     Financial Resource Strain: Low Risk  (5/17/2023)    Financial Resource Strain     Difficulty of Paying Living Expenses: Not very hard     Med Affordability: No   Transportation Needs: No Transportation Needs (5/17/2023)    Transportation Needs     Lack of Transportation: No    Received from [facility]              Review of Systems    Positive for stated complaint:   Other systems are as noted in HPI.  Constitutional and vital signs reviewed.      All other systems reviewed and negative except as noted above.    Physical Exam     ED Triage Vitals [05/17/24 1253]   BP (!) 123/106   Pulse 74   Resp 22   Temp 98.6 °F (37 °C)   Temp src Temporal   SpO2 98 %   O2 Device None (Room  air)       Current Vitals:   Vital Signs  BP: 108/70  Pulse: 69  Resp: 22  Temp: 98.6 °F (37 °C)  Temp src: Temporal  MAP (mmHg): 82    Oxygen Therapy  SpO2: 98 %  O2 Device: None (Room air)            Physical Exam  Vitals and nursing note reviewed.   Constitutional:       Appearance: Normal appearance.   HENT:      Head: Normocephalic and atraumatic.   Cardiovascular:      Rate and Rhythm: Normal rate and regular rhythm.      Pulses: Normal pulses.   Pulmonary:      Effort: Pulmonary effort is normal.      Breath sounds: Normal breath sounds.   Abdominal:      General: Bowel sounds are normal.      Palpations: Abdomen is soft.      Tenderness: There is no abdominal tenderness. There is no guarding or rebound.   Genitourinary:     Comments: Catheter in place, no leakage  Musculoskeletal:         General: Normal range of motion.      Cervical back: Normal range of motion.   Skin:     General: Skin is warm and dry.   Neurological:      General: No focal deficit present.      Mental Status: He is alert.               ED Course   Labs Reviewed - No data to display                   MDM                                         Medical Decision Making  Differential diagnosis includes but is not limited to obstructed catheter, balloon malfunction, etc    Abdominal exam benign, he is not toxic appearing, do not suspect UTI at this time, testing considered and deferred.      Medical Record Review: I personally reviewed available prior medical records for any recent pertinent discharge summaries, testing, and procedures, and reviewed those reports.    Complicating factors: The patient  has a past medical history of BPH (benign prostatic hyperplasia), Calculus of kidney, Colon, diverticulosis, Dementia ([facility]), Diabetes ([facility]), Diabetes mellitus type 2, controlled ([facility]), High blood pressure, Incontinence, Incontinence of feces (1/14/2020), Prediabetes, Recurrent UTI, Tongue cancer ([facility]), and Vitamin B12 deficiency. and  has a  past surgical history that includes tongue and mouth surg unlisted and other surgical history (2011). that contribute to the medical complexity of this ED evaluation.     Clinical impression as well as any lab results and radiology findings were discussed with the patient and/or caregiver. I personally reviewed all laboratory results and radiology images myself. Patient is advised to follow up with PCP for reevaluation. I provided discharge instructions and return precautions. Patient and/or caregiver voices understanding and agreement with the treatment plan. All questions were addressed and answered.         Problems Addressed:  Malfunction of Alejo catheter, initial encounter (HCC): acute illness or injury    Amount and/or Complexity of Data Reviewed  Independent Historian: EMS     Details: As per HPI  External Data Reviewed: notes.     Details: Catheter last changed 4/26        Disposition and Plan     Clinical Impression:  1. Malfunction of Alejo catheter, initial encounter ([facility])         Disposition:  Discharge  5/17/2024  2:34 pm    Follow-up:  Vito Plata MD  [address] [place] [id number]  [phone]    Follow up      We recommend that you schedule follow up care with a primary care provider within the next three months to obtain basic health screening including reassessment of your blood pressure.      Medications Prescribed:  Current Discharge Medication List

## 2024-05-29 DIAGNOSIS — F03.918 DEMENTIA WITH BEHAVIORAL DISTURBANCE (HCC): ICD-10-CM

## 2024-05-29 RX ORDER — DONEPEZIL HYDROCHLORIDE 10 MG/1
10 TABLET, FILM COATED ORAL NIGHTLY
Qty: 90 TABLET | Refills: 0 | Status: SHIPPED | OUTPATIENT
Start: 2024-05-29

## 2024-05-29 NOTE — TELEPHONE ENCOUNTER
Patient's daughter calling to request refills of all his dementia medications. She did not give any names of medications, said Dr. Plata will know which ones.     Requesting 90 day supplies    [facility].    Call her to let her know when scripts have been sent.

## 2024-05-29 NOTE — TELEPHONE ENCOUNTER
Please review; protocol failed/No Protocol    Patients daughter requesting a 90 day supply.     Requested Prescriptions   Pending Prescriptions Disp Refills    donepezil 10 MG Oral Tab 90 tablet 0     Sig: Take 1 tablet (10 mg total) by mouth nightly. FURTHER REFILL PER PSYCHIATRY       There is no refill protocol information for this order          Recent Outpatient Visits              3 months ago Chronic indwelling Alejo catheter    [facility], Vito Garces MD    Office Visit    4 months ago Chronic indwelling Alejo catheter    [facility] [place]    Office Visit    4 months ago Erosion of urethra due to catheterization of urinary tract, [facility]    Office Visit    1 year ago Liver lesion    [facility] [place]    Office Visit    1 year ago [facility] prominence [facility] rib    [facility]    Office Visit

## 2024-10-24 ENCOUNTER — MED REC SCAN ONLY (OUTPATIENT)
Dept: FAMILY MEDICINE CLINIC | Facility: CLINIC | Age: 79
End: 2024-10-24

## 2024-11-01 ENCOUNTER — ANESTHESIA EVENT (OUTPATIENT)
Dept: SURGERY | Facility: HOSPITAL | Age: 79
End: 2024-11-01
Payer: MEDICAID

## 2024-11-01 ENCOUNTER — HOSPITAL ENCOUNTER (INPATIENT)
Facility: HOSPITAL | Age: 79
LOS: 3 days | Discharge: HOME HEALTH | End: 2024-11-04
Attending: EMERGENCY MEDICINE | Admitting: HOSPITALIST
Payer: MEDICAID

## 2024-11-01 ENCOUNTER — ANESTHESIA (OUTPATIENT)
Dept: SURGERY | Facility: HOSPITAL | Age: 79
End: 2024-11-01
Payer: MEDICAID

## 2024-11-01 DIAGNOSIS — R33.9 URINARY RETENTION: Primary | ICD-10-CM

## 2024-11-01 DIAGNOSIS — T83.9XXA DIFFICULT FOLEY CATHETER PLACEMENT (HCC): ICD-10-CM

## 2024-11-01 DIAGNOSIS — F03.911 DEMENTIA WITH AGITATION, UNSPECIFIED DEMENTIA SEVERITY, UNSPECIFIED DEMENTIA TYPE (HCC): ICD-10-CM

## 2024-11-01 LAB
ANION GAP SERPL CALC-SCNC: 9 MMOL/L (ref 0–18)
ANTIBODY SCREEN: NEGATIVE
BASOPHILS # BLD AUTO: 0.03 X10(3) UL (ref 0–0.2)
BASOPHILS NFR BLD AUTO: 0.2 %
BUN BLD-MCNC: 16 MG/DL (ref 9–23)
BUN/CREAT SERPL: 18.8 (ref 10–20)
CALCIUM BLD-MCNC: 9.6 MG/DL (ref 8.7–10.4)
CHLORIDE SERPL-SCNC: 107 MMOL/L (ref 98–112)
CO2 SERPL-SCNC: 23 MMOL/L (ref 21–32)
CREAT BLD-MCNC: 0.85 MG/DL
DEPRECATED RDW RBC AUTO: 43.3 FL (ref 35.1–46.3)
EGFRCR SERPLBLD CKD-EPI 2021: 88 ML/MIN/1.73M2 (ref 60–?)
EOSINOPHIL # BLD AUTO: 0 X10(3) UL (ref 0–0.7)
EOSINOPHIL NFR BLD AUTO: 0 %
ERYTHROCYTE [DISTWIDTH] IN BLOOD BY AUTOMATED COUNT: 14.2 % (ref 11–15)
EST. AVERAGE GLUCOSE BLD GHB EST-MCNC: 117 MG/DL (ref 68–126)
GLUCOSE BLD-MCNC: 125 MG/DL (ref 70–99)
GLUCOSE BLDC GLUCOMTR-MCNC: 109 MG/DL (ref 70–99)
GLUCOSE BLDC GLUCOMTR-MCNC: 133 MG/DL (ref 70–99)
GLUCOSE BLDC GLUCOMTR-MCNC: 168 MG/DL (ref 70–99)
GLUCOSE BLDC GLUCOMTR-MCNC: 169 MG/DL (ref 70–99)
HBA1C MFR BLD: 5.7 % (ref ?–5.7)
HCT VFR BLD AUTO: 40 %
HGB BLD-MCNC: 13.8 G/DL
IMM GRANULOCYTES # BLD AUTO: 0.1 X10(3) UL (ref 0–1)
IMM GRANULOCYTES NFR BLD: 0.7 %
LYMPHOCYTES # BLD AUTO: 0.41 X10(3) UL (ref 1–4)
LYMPHOCYTES NFR BLD AUTO: 3 %
MCH RBC QN AUTO: 29.5 PG (ref 26–34)
MCHC RBC AUTO-ENTMCNC: 34.5 G/DL (ref 31–37)
MCV RBC AUTO: 85.5 FL
MONOCYTES # BLD AUTO: 0.59 X10(3) UL (ref 0.1–1)
MONOCYTES NFR BLD AUTO: 4.3 %
NEUTROPHILS # BLD AUTO: 12.56 X10 (3) UL (ref 1.5–7.7)
NEUTROPHILS # BLD AUTO: 12.56 X10(3) UL (ref 1.5–7.7)
NEUTROPHILS NFR BLD AUTO: 91.8 %
OSMOLALITY SERPL CALC.SUM OF ELEC: 291 MOSM/KG (ref 275–295)
PLATELET # BLD AUTO: 309 10(3)UL (ref 150–450)
POTASSIUM SERPL-SCNC: 4.8 MMOL/L (ref 3.5–5.1)
RBC # BLD AUTO: 4.68 X10(6)UL
RH BLOOD TYPE: POSITIVE
RH BLOOD TYPE: POSITIVE
SODIUM SERPL-SCNC: 139 MMOL/L (ref 136–145)
WBC # BLD AUTO: 13.7 X10(3) UL (ref 4–11)

## 2024-11-01 PROCEDURE — 99222 1ST HOSP IP/OBS MODERATE 55: CPT | Performed by: HOSPITALIST

## 2024-11-01 PROCEDURE — 0W3R8ZZ CONTROL BLEEDING IN GENITOURINARY TRACT, VIA NATURAL OR ARTIFICIAL OPENING ENDOSCOPIC: ICD-10-PCS | Performed by: SURGERY

## 2024-11-01 PROCEDURE — 51040 INCISE & DRAIN BLADDER: CPT | Performed by: SURGERY

## 2024-11-01 PROCEDURE — 99222 1ST HOSP IP/OBS MODERATE 55: CPT | Performed by: SURGERY

## 2024-11-01 PROCEDURE — 52214 CYSTOSCOPY AND TREATMENT: CPT | Performed by: SURGERY

## 2024-11-01 PROCEDURE — 0T9B80Z DRAINAGE OF BLADDER WITH DRAINAGE DEVICE, VIA NATURAL OR ARTIFICIAL OPENING ENDOSCOPIC: ICD-10-PCS | Performed by: SURGERY

## 2024-11-01 PROCEDURE — 0TCB8ZZ EXTIRPATION OF MATTER FROM BLADDER, VIA NATURAL OR ARTIFICIAL OPENING ENDOSCOPIC: ICD-10-PCS | Performed by: SURGERY

## 2024-11-01 PROCEDURE — 0T7D8ZZ DILATION OF URETHRA, VIA NATURAL OR ARTIFICIAL OPENING ENDOSCOPIC: ICD-10-PCS | Performed by: SURGERY

## 2024-11-01 RX ORDER — HALOPERIDOL 5 MG/ML
5 INJECTION INTRAMUSCULAR ONCE
Status: COMPLETED | OUTPATIENT
Start: 2024-11-01 | End: 2024-11-01

## 2024-11-01 RX ORDER — HYDROMORPHONE HYDROCHLORIDE 1 MG/ML
0.2 INJECTION, SOLUTION INTRAMUSCULAR; INTRAVENOUS; SUBCUTANEOUS EVERY 5 MIN PRN
Status: DISCONTINUED | OUTPATIENT
Start: 2024-11-01 | End: 2024-11-01 | Stop reason: HOSPADM

## 2024-11-01 RX ORDER — SODIUM CHLORIDE, SODIUM LACTATE, POTASSIUM CHLORIDE, CALCIUM CHLORIDE 600; 310; 30; 20 MG/100ML; MG/100ML; MG/100ML; MG/100ML
INJECTION, SOLUTION INTRAVENOUS CONTINUOUS
Status: DISCONTINUED | OUTPATIENT
Start: 2024-11-01 | End: 2024-11-01 | Stop reason: HOSPADM

## 2024-11-01 RX ORDER — DEXTROSE MONOHYDRATE 25 G/50ML
50 INJECTION, SOLUTION INTRAVENOUS
Status: DISCONTINUED | OUTPATIENT
Start: 2024-11-01 | End: 2024-11-01 | Stop reason: HOSPADM

## 2024-11-01 RX ORDER — NALOXONE HYDROCHLORIDE 0.4 MG/ML
0.08 INJECTION, SOLUTION INTRAMUSCULAR; INTRAVENOUS; SUBCUTANEOUS AS NEEDED
Status: DISCONTINUED | OUTPATIENT
Start: 2024-11-01 | End: 2024-11-01 | Stop reason: HOSPADM

## 2024-11-01 RX ORDER — DEXAMETHASONE SODIUM PHOSPHATE 4 MG/ML
VIAL (ML) INJECTION AS NEEDED
Status: DISCONTINUED | OUTPATIENT
Start: 2024-11-01 | End: 2024-11-01 | Stop reason: SURG

## 2024-11-01 RX ORDER — MORPHINE SULFATE 4 MG/ML
4 INJECTION, SOLUTION INTRAMUSCULAR; INTRAVENOUS EVERY 10 MIN PRN
Status: DISCONTINUED | OUTPATIENT
Start: 2024-11-01 | End: 2024-11-01 | Stop reason: HOSPADM

## 2024-11-01 RX ORDER — MIDAZOLAM HYDROCHLORIDE 1 MG/ML
1 INJECTION INTRAMUSCULAR; INTRAVENOUS
Status: DISCONTINUED | OUTPATIENT
Start: 2024-11-01 | End: 2024-11-01

## 2024-11-01 RX ORDER — HYDRALAZINE HYDROCHLORIDE 20 MG/ML
10 INJECTION INTRAMUSCULAR; INTRAVENOUS EVERY 4 HOURS PRN
Status: DISCONTINUED | OUTPATIENT
Start: 2024-11-01 | End: 2024-11-04

## 2024-11-01 RX ORDER — SODIUM CHLORIDE, SODIUM LACTATE, POTASSIUM CHLORIDE, CALCIUM CHLORIDE 600; 310; 30; 20 MG/100ML; MG/100ML; MG/100ML; MG/100ML
INJECTION, SOLUTION INTRAVENOUS CONTINUOUS PRN
Status: DISCONTINUED | OUTPATIENT
Start: 2024-11-01 | End: 2024-11-01 | Stop reason: SURG

## 2024-11-01 RX ORDER — ACETAMINOPHEN 500 MG
500 TABLET ORAL EVERY 4 HOURS PRN
Status: DISCONTINUED | OUTPATIENT
Start: 2024-11-01 | End: 2024-11-04

## 2024-11-01 RX ORDER — MIDAZOLAM HYDROCHLORIDE 1 MG/ML
1 INJECTION INTRAMUSCULAR; INTRAVENOUS ONCE
Status: COMPLETED | OUTPATIENT
Start: 2024-11-01 | End: 2024-11-01

## 2024-11-01 RX ORDER — ONDANSETRON 2 MG/ML
INJECTION INTRAMUSCULAR; INTRAVENOUS AS NEEDED
Status: DISCONTINUED | OUTPATIENT
Start: 2024-11-01 | End: 2024-11-01 | Stop reason: SURG

## 2024-11-01 RX ORDER — NICOTINE POLACRILEX 4 MG
30 LOZENGE BUCCAL
Status: DISCONTINUED | OUTPATIENT
Start: 2024-11-01 | End: 2024-11-01 | Stop reason: HOSPADM

## 2024-11-01 RX ORDER — LIDOCAINE HYDROCHLORIDE 20 MG/ML
10 JELLY TOPICAL ONCE
Status: COMPLETED | OUTPATIENT
Start: 2024-11-01 | End: 2024-11-01

## 2024-11-01 RX ORDER — HYDROMORPHONE HYDROCHLORIDE 1 MG/ML
0.6 INJECTION, SOLUTION INTRAMUSCULAR; INTRAVENOUS; SUBCUTANEOUS EVERY 5 MIN PRN
Status: DISCONTINUED | OUTPATIENT
Start: 2024-11-01 | End: 2024-11-01 | Stop reason: HOSPADM

## 2024-11-01 RX ORDER — MAGNESIUM HYDROXIDE 1200 MG/15ML
3000 LIQUID ORAL CONTINUOUS
Status: DISCONTINUED | OUTPATIENT
Start: 2024-11-01 | End: 2024-11-04

## 2024-11-01 RX ORDER — MORPHINE SULFATE 4 MG/ML
2 INJECTION, SOLUTION INTRAMUSCULAR; INTRAVENOUS EVERY 10 MIN PRN
Status: DISCONTINUED | OUTPATIENT
Start: 2024-11-01 | End: 2024-11-01 | Stop reason: HOSPADM

## 2024-11-01 RX ORDER — ONDANSETRON 2 MG/ML
4 INJECTION INTRAMUSCULAR; INTRAVENOUS EVERY 6 HOURS PRN
Status: DISCONTINUED | OUTPATIENT
Start: 2024-11-01 | End: 2024-11-04

## 2024-11-01 RX ORDER — HYDROMORPHONE HYDROCHLORIDE 1 MG/ML
0.4 INJECTION, SOLUTION INTRAMUSCULAR; INTRAVENOUS; SUBCUTANEOUS EVERY 5 MIN PRN
Status: DISCONTINUED | OUTPATIENT
Start: 2024-11-01 | End: 2024-11-01 | Stop reason: HOSPADM

## 2024-11-01 RX ORDER — NICOTINE POLACRILEX 4 MG
15 LOZENGE BUCCAL
Status: DISCONTINUED | OUTPATIENT
Start: 2024-11-01 | End: 2024-11-01 | Stop reason: HOSPADM

## 2024-11-01 RX ORDER — MORPHINE SULFATE 10 MG/ML
6 INJECTION, SOLUTION INTRAMUSCULAR; INTRAVENOUS EVERY 10 MIN PRN
Status: DISCONTINUED | OUTPATIENT
Start: 2024-11-01 | End: 2024-11-01 | Stop reason: HOSPADM

## 2024-11-01 RX ADMIN — DEXAMETHASONE SODIUM PHOSPHATE 8 MG: 4 MG/ML VIAL (ML) INJECTION at 18:13:00

## 2024-11-01 RX ADMIN — ONDANSETRON 4 MG: 2 INJECTION INTRAMUSCULAR; INTRAVENOUS at 18:15:00

## 2024-11-01 RX ADMIN — SODIUM CHLORIDE, SODIUM LACTATE, POTASSIUM CHLORIDE, CALCIUM CHLORIDE: 600; 310; 30; 20 INJECTION, SOLUTION INTRAVENOUS at 19:37:00

## 2024-11-01 RX ADMIN — SODIUM CHLORIDE, SODIUM LACTATE, POTASSIUM CHLORIDE, CALCIUM CHLORIDE: 600; 310; 30; 20 INJECTION, SOLUTION INTRAVENOUS at 17:55:00

## 2024-11-01 NOTE — ANESTHESIA PROCEDURE NOTES
Airway  Date/Time: 11/1/2024 5:55 PM  Urgency: Elective      General Information and Staff    Patient location during procedure: OR  Anesthesiologist: Yogesh Valentin MD, PhD  Performed: anesthesiologist   Performed by: Yogesh Valentin MD, PhD  Authorized by: Yogesh Valentin MD, PhD      Indications and Patient Condition  Indications for airway management: anesthesia  Sedation level: deep  Preoxygenated: yes  Patient position: sniffing  Mask difficulty assessment: 1 - vent by mask    Final Airway Details  Final airway type: endotracheal airway      Successful airway: ETT  Cuffed: yes   Successful intubation technique: Video laryngoscopy  Blade: GlideScope  Blade size: #3  ETT size (mm): 7.0    Cormack-Lehane Classification: grade IIA - partial view of glottis  Placement verified by: capnometry

## 2024-11-01 NOTE — H&P (VIEW-ONLY)
Urology Inpatient Consult Note  [facility]  [facility] of [facility]      Jose Eduardo Padilla Patient Status:  Emergency    1945 MRN [id number]   Location [facility] EMERGENCY DEPARTMENT Attending Ej Dunn MD   Hosp Day # 0 PCP Vito Plata MD     Date of Admission:  2024  Date of Consult: 2024  Primary Care Provider: Vito Plata MD     Consulting Provider: Dr. Dunn    Reason for Consultation:   Urinary retention, urethral stricture    History of Present Illness:   Jose Eduardo Padilla is a 79 year old male with history of dementia, nephrolithiasis, diabetes, hypertension, incontinence of stool and urine, urinary retention and BPH.  He has been seen by Dr. Hernandez and Cindy in the past.  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  Presents today with urinary retention after patient pulled out Alejo and bladder scan greater than 600 mL.  ED staff unable to replace Alejo catheter after 3 attempts in the ED so urology was consulted.    Unable to place 12 or 18 Fr coude at bedside due to significant resistance a few cm beyond the iatrogenic hypospadiac meatus. Exam shows urethral erosion down to base of the penis. Patient required restraints and sedation for Alejo attempt.     Reviewed prior CT from  - good window to bladder above pubic bone.    Spoke with daughter Renay on the phone about options for cystoscopy, urethral dilation, urethral vs. SP Alejo placement. She elects for SPT placement.    History:     Past Medical History:    BPH (benign prostatic hyperplasia)    Calculus of kidney    Colon, diverticulosis    Dementia (HCC)    Diabetes (HCC)    Diabetes mellitus type 2, controlled (HCC)    High blood pressure    Incontinence    Incontinence of feces    Prediabetes    borderline    Recurrent UTI    Tongue cancer (HCC)    Vitamin B12 deficiency       Past Surgical History:   Procedure Laterality Date     Other surgical history  2011    tongue cancer surgery, [place], no radiation/ chemo    Tongue and mouth surg unlisted      To remove Tongue CA '11       Family History   Problem Relation Age of Onset    Cancer Mother     Cancer Sister        Social History     Socioeconomic History    Marital status:    Tobacco Use    Smoking status: Never    Smokeless tobacco: Never   Vaping Use    Vaping status: Never Used   Substance and Sexual Activity    Alcohol use: No    Drug use: No     Social Drivers of Health     Financial Resource Strain: Low Risk  (5/17/2023)    Financial Resource Strain     Difficulty of Paying Living Expenses: Not very hard     Med Affordability: No   Transportation Needs: No Transportation Needs (5/17/2023)    Transportation Needs     Lack of Transportation: No    Received from [facility]       Medications:  Current Outpatient Medications   Medication Sig Dispense Refill    donepezil 10 MG Oral Tab Take 1 tablet (10 mg total) by mouth nightly. 90 tablet 0    tamsulosin 0.4 MG Oral Cap Take 1 capsule (0.4 mg total) by mouth daily. 90 capsule 0    acetaminophen 500 MG Oral Tab Take 1 tablet (500 mg total) by mouth every 6 (six) hours as needed for Pain or Fever. 100 tablet 1    melatonin 5 MG Oral Tab Take 1 tablet (5 mg total) by mouth nightly. For sleep      Glucose Blood (ACCU-CHEK ABRAM PLUS) In Vitro Strip Check sugars daily as needed dispense lancets as well 100 strip 1    Diapers & Supplies Does not apply Misc Adult diapers 100 Device 3       Allergies:  Allergies[1]    Review of Systems:   A comprehensive 10-point review of systems was completed.  Pertinent positives and negatives are noted in the the HPI.    Physical Exam:   Vital Signs:  Blood pressure (!) 118/106, pulse 111, temperature 98.1 °F (36.7 °C), temperature source Temporal, resp. rate 20, SpO2 99%.     CONSTITUTIONAL: Well developed, well nourished, in no acute distress  NEUROLOGIC: Alert,  agitated with Alejo attempt  HEAD: Normocephalic, atraumatic  EYES: Sclera non-icteric  ENT: Hearing intact, moist mucous membranes  NECK: No obvious goiter or masses  RESPIRATORY: Normal respiratory effort  SKIN: No evident rashes  ABDOMEN: Soft, non-tender, non-distended, lower abd distension  GENITOURINARY: Urethral erosion to base of penis      Laboratory Data:  Lab Results   Component Value Date    WBC 9.8 03/14/2024    HGB 13.1 03/14/2024    .0 03/14/2024     Lab Results   Component Value Date     03/14/2024    K 4.1 03/14/2024     03/14/2024    CO2 28.0 03/14/2024    BUN 20 03/14/2024     (H) 03/14/2024    GFRAA 102 08/02/2022    AST 11 03/14/2024    ALT 7 (L) 03/14/2024    TP 8.2 03/14/2024    ALB 4.0 03/14/2024    PHOS 3.0 06/13/2022    CA 9.6 03/14/2024    MG 2.0 12/04/2023       Urinalysis Results (last three years):  Recent Labs     12/20/21  1434 05/03/22  1644 05/21/22  1307 06/11/22  2105 08/01/22  2049 08/25/22  1628 08/27/22  2224 01/27/23  1310 01/27/23  1405 05/03/23  1739 05/15/23  0753 06/09/23  1543 09/27/23  1047 11/07/23  1446 12/04/23  1204 01/03/24  1442 02/01/24  1035 03/14/24  1654 04/13/24  1759   COLORUR Yellow Yellow Yellow Yellow Yellow Yellow Yellow Red* Yellow Yellow Yellow Dark-Red* Yellow Yellow Yellow  --  Yellow Yellow Yellow   CLARITY Cloudy* Cloudy* Cloudy* Cloudy* Clear Cloudy* Clear Cloudy* Turbid* Turbid* Turbid* Ex.Turbid* Ex.Turbid* Turbid* Ex.Turbid*  --  Ex.Turbid* Turbid* Turbid*   SPECGRAVITY 1.025 1.016 1.016 1.017 1.015 1.015 1.010 1.015 1.010 1.021 1.023 1.007 1.020 1.020 1.020 1.015 1.018 1.025 1.018   PHURINE 5.0 6.0 6.0 9.0* 5.0 8.5* >=9.0* >=9.0* 8.5* 5.5 5.0 6.0 5.0 5.5 5.5 6.0 5.5 5.5 7.0   PROUR 30* 100* 100* >=500* 100 mg/dL* 100 mg/dL* 100 mg/dL* >=300* >=300* Trace Trace 70* Trace* 200* 50*  --  20* 70* 50*   GLUUR Negative Negative Negative Negative Negative Negative Negative Negative Negative Normal Normal Normal Normal Normal  Normal  --  Normal Normal Normal   KETUR Negative Negative Negative Negative Trace* Negative Negative Negative Negative Negative Negative Negative Negative Negative Negative  --  Negative Negative Negative   BILUR Negative Negative Negative Negative Negative Negative  --  Negative Negative Negative Negative Negative Negative Negative Negative  --  Negative Negative Negative   BLOODURINE Large* Large* Moderate* Small* Large* Moderate* Trace-Intact* Moderate* Large* Trace* Trace* 3+* 1+* 3+* 2+*  --  3+* 3+* 3+*   NITRITE Positive* Positive* Negative Negative Negative Positive* Positive* Positive* Positive* Negative Negative Negative 2+* 1+* 2+* Negative 1+* Negative 2+*   UROBILINOGEN <2.0 <2.0 <2.0 <2.0 0.2 0.2 0.2 0.2 0.2 Normal Normal Normal Normal Normal Normal  --  Normal Normal Normal   LEUUR Large* Large* Large* Large* Small* Large* Large* Moderate* Moderate* 500* 500* 75* 500* 500* 500*  --  500* 500* 500*   UASA  --  Negative Negative Negative  --   --   --   --   --   --   --   --   --   --   --   --   --   --   --    WBCUR >50* >50* >50* >50* >50*  >50* >50*  >50* 11-20*  11-20* >50*  >50* >50*  >50* >50* >50* 6-10* >50* >50* >50*  --  >50* >50* >50*   RBCUR >10* >10* >10* 6-10* >10*  >10* >10*  >10* >10*  >10* >10*  >10* >10*  >10* 3-5* >10* >10* 6-10* >10* >10*  --  >10* >10* >10*   BACUR 3+* 1+* Rare* 3+* 1+*  1+* None Seen  None Seen 2+*  2+* 1+*  1+* 3+*  3+* None Seen Rare* Rare* 2+* 1+* 3+*  --  1+* None Seen 3+*       Urine Culture Results (last three years):  Lab Results   Component Value Date    URINECUL >100,000 CFU/ML Escherichia coli (A) 04/13/2024    URINECUL >100,000 CFU/ML Escherichia coli (A) 03/14/2024    URINECUL No Growth at 18-24 hrs. 01/03/2024    URINECUL  12/04/2023     ,000 cfu/ml Multiple species present- probable contamination.    URINECUL >100,000 CFU/ML Escherichia coli (A) 12/04/2023    URINECUL >100,000 CFU/ML Escherichia coli (A) 11/07/2023    URINECUL   09/27/2023     ,000 cfu/ml Multiple species present- probable contamination.    URINECUL No Growth at 18-24 hrs. 06/09/2023    URINECUL >100,000 CFU/ML Stenotrophomonas maltophilia (A) 05/15/2023    URINECUL >100,000 CFU/ML Enterococcus faecalis NOT VRE (A) 05/15/2023    URINECUL >100,000 CFU/ML Enterococcus faecalis VRE (A) 05/03/2023    URINECUL (A) 05/03/2023     10,000 - 50,000 CFU/ML Stenotrophomonas maltophilia    URINECUL >100,000 CFU/ML Proteus mirabilis (A) 01/27/2023    URINECUL 50,000-99,000 CFU/ML Proteus mirabilis (A) 08/25/2022    URINECUL (A) 08/01/2022     10,000 - 50,000 CFU/ML Staphylococcus species, not aureus       PSA:  Lab Results   Component Value Date    PSAS 6.04 (H) 09/30/2021        Imaging (last three days):  No results found.       Impression:   Jose Eduardo Padilla is a 79 year old male with history of dementia, nephrolithiasis, diabetes, hypertension, incontinence of stool and urine, urinary retention and BPH.  He has been seen by Dr. Hernandez and Cindy in the past.  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  Presents today with urinary retention after patient pulled out Alejo and bladder scan greater than 600 mL.  ED staff unable to replace Alejo catheter after 3 attempts in the ED so urology was consulted.    Unable to place 12 or 18 Fr coude at bedside due to significant resistance a few cm beyond the iatrogenic hypospadiac meatus. Exam shows urethral erosion down to base of the penis. Patient required restraints and sedation for Alejo attempt.     Reviewed prior CT from 2023 - good window to bladder above pubic bone.    Spoke with daughter Renay on the phone about options for cystoscopy, urethral dilation, urethral vs. SP Alejo placement. She elects for SPT placement.    Recommendations:   - OR for cystoscopy, possible urethral dilation and Alejo placement, suprapubic tube placement      I have personally reviewed all  relevant medical records, labs, and imaging.    Thank you for this consult. Please call if there are any questions or concerns.      Medical Decision Making  Urinary retention: Undiagnosed new problem  Urethral stricture: Undiagnosed new problem  Urethral erosion: Chronic problem    Amount and/or Complexity of Data Reviewed  External Data Reviewed: labs and notes.    Risk  Minor surgery with identified risk factors.        Hank Grigsby MD  Staff Urologist  [facility]  Office: [phone]               [1] No Known Allergies

## 2024-11-01 NOTE — ANESTHESIA PREPROCEDURE EVALUATION
Anesthesia PreOp Note    HPI:     Jose Eduardo Padilla is a 79 year old male who presents for preoperative consultation requested by: Hank Grigsby MD    Date of Surgery: 11/1/2024    Procedure(s):  Cystoscopy, possible suprapubic tube placement  PROSTATECTOMY SUPRAPUBIC  Indication: Urinary retention    Relevant Problems   No relevant active problems       NPO:  Last Liquid Consumption Date: 10/31/24  Last Liquid Consumption Time: 1900  Last Solid Consumption Date: 10/31/24  Last Solid Consumption Time: 1900  Last Liquid Consumption Date: 10/31/24  NPO: Yes       History Review:  Patient Active Problem List    Diagnosis Date Noted    Urinary retention 11/01/2024    Diet-controlled diabetes mellitus (HCC) 01/31/2024    Chronic retention of urine     Chronic indwelling Alejo catheter 10/17/2022    History of ESBL E. 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/28/2022    Colon, diverticulosis 06/28/2022    BPH with obstruction/lower urinary tract symptoms 06/28/2022    Elevated PSA 10/09/2021    Vitamin B12 deficiency 02/22/2018    Controlled type 2 diabetes mellitus with complication, without long-term current use of insulin (HCC) 02/22/2018    Dementia with behavioral disturbance (HCC) 02/14/2018    History of tongue cancer 02/14/2018    Obesity (BMI 30-39.9) 02/14/2018       Past Medical History:    BPH (benign prostatic hyperplasia)    Calculus of kidney    Colon, diverticulosis    Dementia (HCC)    Diabetes (HCC)    Diabetes mellitus type 2, controlled (HCC)    High blood pressure    Incontinence    Incontinence of feces    Prediabetes    borderline    Recurrent UTI    Tongue cancer (HCC)    Vitamin B12 deficiency       Past Surgical History:   Procedure Laterality Date    Other surgical history  2011    tongue cancer surgery, [place], no radiation/ chemo    Tongue and mouth surg unlisted      To remove Tongue CA '11       Prescriptions  Prior to Admission[1]  Current Medications and Prescriptions Ordered in Epic[2]    Allergies[3]    Family History   Problem Relation Age of Onset    Cancer Mother     Cancer Sister      Social History     Socioeconomic History    Marital status:    Tobacco Use    Smoking status: Never    Smokeless tobacco: Never   Vaping Use    Vaping status: Never Used   Substance and Sexual Activity    Alcohol use: No    Drug use: No       Available pre-op labs reviewed.  Lab Results   Component Value Date    WBC 13.7 (H) 11/01/2024    RBC 4.68 11/01/2024    HGB 13.8 11/01/2024    HCT 40.0 11/01/2024    MCV 85.5 11/01/2024    MCH 29.5 11/01/2024    MCHC 34.5 11/01/2024    RDW 14.2 11/01/2024    .0 11/01/2024     Lab Results   Component Value Date    PGLU 168 (H) 11/01/2024          Vital Signs:  There is no height or weight on file to calculate BMI.   temporal temperature is 98.1 °F (36.7 °C). His blood pressure is 159/64 and his pulse is 89. His respiration is 22 and oxygen saturation is 100%.   Vitals:    11/01/24 1107 11/01/24 1500 11/01/24 1530 11/01/24 1545   BP: (!) 118/106 (!) 184/80 (!) 178/79 159/64   Pulse: 111 93 90 89   Resp: 20 18 18 22   Temp: 98.1 °F (36.7 °C)      TempSrc: Temporal      SpO2: 99% 100% 100% 100%        Anesthesia Evaluation     Patient summary reviewed    Airway   Mallampati: unable to assess  Dental      Pulmonary - normal exam   Cardiovascular - normal exam  (+) hypertension    Rhythm: regular    Neuro/Psych      GI/Hepatic/Renal    (+) liver disease    Endo/Other    (+) diabetes mellitus  Abdominal  - normal exam     Other findings: Poor dentition       Hx of tongue and neck surgery / unable to examine the airway      Anesthesia Plan:   ASA:  4  Emergent    Plan:   General  Airway:  ETT  Informed Consent Plan and Risks Discussed With:  Patient      I have informed Jose Eduardo Padilla and/or legal guardian or family member of the nature of the anesthetic plan, benefits, risks  including possible dental damage if relevant, major complications, and any alternative forms of anesthetic management.   All of the patient's questions were answered to the best of my ability. The patient desires the anesthetic management as planned.  Yogesh Valentin MD, PhD  11/1/2024 5:12 PM  Present on Admission:  **None**           [1] (Not in a hospital admission)  [2]   No current Epic-ordered facility-administered medications on file.     Current Outpatient Medications Ordered in Epic   Medication Sig Dispense Refill    donepezil 10 MG Oral Tab Take 1 tablet (10 mg total) by mouth nightly. 90 tablet 0    tamsulosin 0.4 MG Oral Cap Take 1 capsule (0.4 mg total) by mouth daily. 90 capsule 0    acetaminophen 500 MG Oral Tab Take 1 tablet (500 mg total) by mouth every 6 (six) hours as needed for Pain or Fever. 100 tablet 1    melatonin 5 MG Oral Tab Take 1 tablet (5 mg total) by mouth nightly. For sleep      Glucose Blood (ACCU-CHEK ABRAM PLUS) In Vitro Strip Check sugars daily as needed dispense lancets as well 100 strip 1    Diapers & Supplies Does not apply Misc Adult diapers 100 Device 3   [3] No Known Allergies

## 2024-11-01 NOTE — ED QUICK NOTES
Orders for admission, patient is aware of plan and ready to go upstairs. Any questions, please call ED RN Estrella at extension [phone].     Patient Covid vaccination status: Partially vaccinated     COVID Test Ordered in ED: None    COVID Suspicion at Admission: N/A    Running Infusions:      Mental Status/LOC at time of transport: Aox1 (self) at baseline    Other pertinent information:   CIWA score: N/A   NIH score:  N/A

## 2024-11-01 NOTE — OPERATIVE REPORT
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 The patient was counseled on options, risks, and benefits and elected to undergo the above procedure. We discussed risks including, but not limited to, bleeding, infection, damage to surrounding structures, need for repeat procedure(s). The patient understood these risks and wished to proceed with surgery.    Findings:  Urethral erosion to base of penis. ~10Fr bulbar urethral stricture dilated up to 28 Fr. Severely enlarged prostate with trilobar hyperplasia, significant oozing from prostate - multiple large bladder clots evacuated and prostate cauterized extensively. Normal bladder on cystoscopy. Successful placement of 18 Fr suprapubic tube in anterior bladder.    Procedure:  The patient was taken to the operating room and a timeout was performed confirming the correct patient and procedure. The patient was prepped and draped in lithotomy position after undergoing general anesthesia. Pre-operative prophylactic antibiotics were given in the form of Ceftriaxone.    A cystoscope was inserted per urethra. 10 Fr bulbar urethral stricture was seen.  I inserted a sensor wire through this and into the bladder.  I removed the scope.  I sequentially dilated the urethral stricture from 16 Guyanese up to 28 Guyanese using Cerna sounds.  I reinserted the scope and was able to easily bypassed the stricture into the bladder.  The prostate was significantly enlarged with many oozing blood vessels and there were  several large clots in the bladder.  I removed the cystoscope and reinserted a bipolar resectoscope.  I evacuated the clots from the bladder.  I used a medium bipolar loop to cauterize the prostate extensively till hemostasis was obtained.  I cauterized a good majority of the median lobe and some of the lateral lobes.    The bladder was surveyed and appeared normal.  I distended the bladder with fluid and visualize the anterior bladder dome.    In a location 2 fingerbreadths above the pubic bone in the midline I inserted a spinal needle at this location and the tip of the needle was visualized cystoscopically at the anterior dome of the bladder in a good position.     I then removed the needle, and using a #15 scalpel made a 1 cm incision in this location.  I inserted the suprapubic tube introducer under direct cystoscopic visualization.  Once the introducer was in appropriate position and the bladder remove the inner cannula and quickly inserted a 18 Moroccan Alejo catheter.  Yellow urine immediately drained.  I inflated the balloon with 10 mL sterile water.    Using two 3-0 nylon sutures I secured the catheter to the skin.  I removed the cystoscope.  I inserted a 22 Moroccan three-way Alejo catheter and inflated the balloon with 50 mL sterile water.  I capped the suprapubic tube and attached the three-way urethral Alejo catheter to CBI.  I secured the suprapubic tube under a dressing.    The patient was awoken from anesthesia and transferred to PACU in stable condition. The patient tolerated the procedure well. All instrument/supply counts were correct at the end of the case.    Specimens:   None    Estimated Blood Loss:  1 mL    Tubes/Drains:  18 Moroccan suprapubic Alejo catheter  22 Moroccan three-way urethral Alejo catheter to CBI    Complications:   None immediate    Condition from OR:  Stable    Plan:   Run CBI per urethral Alejo catheter.  Will consider clamp trial once urine clears eventually remove urethral Alejo  catheter prior to discharge.  At this time will attach suprapubic tube to drainage.  Return to urology clinic for first suprapubic tube change and drain stitch removal in 1 month.      Hank Grigsby MD  Staff Urologist  [facility]  Office: [phone]

## 2024-11-01 NOTE — ED QUICK NOTES
Unsuccessful attempt on indwelling eddy insertion. Pt's penis is split and pt was getting aggressive, trying to bite staff during insertion attempt. MD made aware.

## 2024-11-01 NOTE — INTERVAL H&P NOTE
Pre-op Diagnosis: Urinary retention    The above referenced H&P was reviewed by Hank Grigsby MD on 11/1/2024, the patient was examined and no significant changes have occurred in the patient's condition since the H&P was performed.  I discussed with the patient and/or legal representative the potential benefits, risks and side effects of this procedure; the likelihood of the patient achieving goals; and potential problems that might occur during recuperation.  I discussed reasonable alternatives to the procedure, including risks, benefits and side effects related to the alternatives and risks related to not receiving this procedure.  We will proceed with procedure as planned.

## 2024-11-01 NOTE — CONSULTS
Urology Inpatient Consult Note  [facility]  part of [facility]      Jose Eduardo Padilla Patient Status:  Emergency    1945 MRN [id number]   Location [facility] EMERGENCY DEPARTMENT Attending Ej Dunn MD   Hosp Day # 0 PCP Vito Plata MD     Date of Admission:  2024  Date of Consult: 2024  Primary Care Provider: Vito Plata MD     Consulting Provider: Dr. Dunn    Reason for Consultation:   Urinary retention, urethral stricture    History of Present Illness:   Jose Eduardo Padilla is a 79 year old male with history of dementia, nephrolithiasis, diabetes, hypertension, incontinence of stool and urine, urinary retention and BPH.  He has been seen by Dr. Hernandez and Cindy in the past.  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  Presents today with urinary retention after patient pulled out Alejo and bladder scan greater than 600 mL.  ED staff unable to replace Alejo catheter after 3 attempts in the ED so urology was consulted.    Unable to place 12 or 18 Fr coude at bedside due to significant resistance a few cm beyond the iatrogenic hypospadiac meatus. Exam shows urethral erosion down to base of the penis. Patient required restraints and sedation for Alejo attempt.     Reviewed prior CT from  - good window to bladder above pubic bone.    Spoke with daughter Renay on the phone about options for cystoscopy, urethral dilation, urethral vs. SP Alejo placement. She elects for SPT placement.    History:     Past Medical History:    BPH (benign prostatic hyperplasia)    Calculus of kidney    Colon, diverticulosis    Dementia (HCC)    Diabetes (HCC)    Diabetes mellitus type 2, controlled (HCC)    High blood pressure    Incontinence    Incontinence of feces    Prediabetes    borderline    Recurrent UTI    Tongue cancer (HCC)    Vitamin B12 deficiency       Past Surgical History:   Procedure Laterality Date     Other surgical history  2011    tongue cancer surgery, [place], no radiation/ chemo    Tongue and mouth surg unlisted      To remove Tongue CA '11       Family History   Problem Relation Age of Onset    Cancer Mother     Cancer Sister        Social History     Socioeconomic History    Marital status:    Tobacco Use    Smoking status: Never    Smokeless tobacco: Never   Vaping Use    Vaping status: Never Used   Substance and Sexual Activity    Alcohol use: No    Drug use: No     Social Drivers of Health     Financial Resource Strain: Low Risk  (5/17/2023)    Financial Resource Strain     Difficulty of Paying Living Expenses: Not very hard     Med Affordability: No   Transportation Needs: No Transportation Needs (5/17/2023)    Transportation Needs     Lack of Transportation: No    Received from [facility], [facility]       Medications:  Current Outpatient Medications   Medication Sig Dispense Refill    donepezil 10 MG Oral Tab Take 1 tablet (10 mg total) by mouth nightly. 90 tablet 0    tamsulosin 0.4 MG Oral Cap Take 1 capsule (0.4 mg total) by mouth daily. 90 capsule 0    acetaminophen 500 MG Oral Tab Take 1 tablet (500 mg total) by mouth every 6 (six) hours as needed for Pain or Fever. 100 tablet 1    melatonin 5 MG Oral Tab Take 1 tablet (5 mg total) by mouth nightly. For sleep      Glucose Blood (ACCU-CHEK ABRAM PLUS) In Vitro Strip Check sugars daily as needed dispense lancets as well 100 strip 1    Diapers & Supplies Does not apply Misc Adult diapers 100 Device 3       Allergies:  Allergies[1]    Review of Systems:   A comprehensive 10-point review of systems was completed.  Pertinent positives and negatives are noted in the the HPI.    Physical Exam:   Vital Signs:  Blood pressure (!) 118/106, pulse 111, temperature 98.1 °F (36.7 °C), temperature source Temporal, resp. rate 20, SpO2 99%.     CONSTITUTIONAL: Well developed, well nourished, in no acute distress  NEUROLOGIC: Alert,  agitated with Alejo attempt  HEAD: Normocephalic, atraumatic  EYES: Sclera non-icteric  ENT: Hearing intact, moist mucous membranes  NECK: No obvious goiter or masses  RESPIRATORY: Normal respiratory effort  SKIN: No evident rashes  ABDOMEN: Soft, non-tender, non-distended, lower abd distension  GENITOURINARY: Urethral erosion to base of penis      Laboratory Data:  Lab Results   Component Value Date    WBC 9.8 03/14/2024    HGB 13.1 03/14/2024    .0 03/14/2024     Lab Results   Component Value Date     03/14/2024    K 4.1 03/14/2024     03/14/2024    CO2 28.0 03/14/2024    BUN 20 03/14/2024     (H) 03/14/2024    GFRAA 102 08/02/2022    AST 11 03/14/2024    ALT 7 (L) 03/14/2024    TP 8.2 03/14/2024    ALB 4.0 03/14/2024    PHOS 3.0 06/13/2022    CA 9.6 03/14/2024    MG 2.0 12/04/2023       Urinalysis Results (last three years):  Recent Labs     12/20/21  1434 05/03/22  1644 05/21/22  1307 06/11/22  2105 08/01/22  2049 08/25/22  1628 08/27/22  2224 01/27/23  1310 01/27/23  1405 05/03/23  1739 05/15/23  0753 06/09/23  1543 09/27/23  1047 11/07/23  1446 12/04/23  1204 01/03/24  1442 02/01/24  1035 03/14/24  1654 04/13/24  1759   COLORUR Yellow Yellow Yellow Yellow Yellow Yellow Yellow Red* Yellow Yellow Yellow Dark-Red* Yellow Yellow Yellow  --  Yellow Yellow Yellow   CLARITY Cloudy* Cloudy* Cloudy* Cloudy* Clear Cloudy* Clear Cloudy* Turbid* Turbid* Turbid* Ex.Turbid* Ex.Turbid* Turbid* Ex.Turbid*  --  Ex.Turbid* Turbid* Turbid*   SPECGRAVITY 1.025 1.016 1.016 1.017 1.015 1.015 1.010 1.015 1.010 1.021 1.023 1.007 1.020 1.020 1.020 1.015 1.018 1.025 1.018   PHURINE 5.0 6.0 6.0 9.0* 5.0 8.5* >=9.0* >=9.0* 8.5* 5.5 5.0 6.0 5.0 5.5 5.5 6.0 5.5 5.5 7.0   PROUR 30* 100* 100* >=500* 100 mg/dL* 100 mg/dL* 100 mg/dL* >=300* >=300* Trace Trace 70* Trace* 200* 50*  --  20* 70* 50*   GLUUR Negative Negative Negative Negative Negative Negative Negative Negative Negative Normal Normal Normal Normal Normal  Normal  --  Normal Normal Normal   KETUR Negative Negative Negative Negative Trace* Negative Negative Negative Negative Negative Negative Negative Negative Negative Negative  --  Negative Negative Negative   BILUR Negative Negative Negative Negative Negative Negative  --  Negative Negative Negative Negative Negative Negative Negative Negative  --  Negative Negative Negative   BLOODURINE Large* Large* Moderate* Small* Large* Moderate* Trace-Intact* Moderate* Large* Trace* Trace* 3+* 1+* 3+* 2+*  --  3+* 3+* 3+*   NITRITE Positive* Positive* Negative Negative Negative Positive* Positive* Positive* Positive* Negative Negative Negative 2+* 1+* 2+* Negative 1+* Negative 2+*   UROBILINOGEN <2.0 <2.0 <2.0 <2.0 0.2 0.2 0.2 0.2 0.2 Normal Normal Normal Normal Normal Normal  --  Normal Normal Normal   LEUUR Large* Large* Large* Large* Small* Large* Large* Moderate* Moderate* 500* 500* 75* 500* 500* 500*  --  500* 500* 500*   UASA  --  Negative Negative Negative  --   --   --   --   --   --   --   --   --   --   --   --   --   --   --    WBCUR >50* >50* >50* >50* >50*  >50* >50*  >50* 11-20*  11-20* >50*  >50* >50*  >50* >50* >50* 6-10* >50* >50* >50*  --  >50* >50* >50*   RBCUR >10* >10* >10* 6-10* >10*  >10* >10*  >10* >10*  >10* >10*  >10* >10*  >10* 3-5* >10* >10* 6-10* >10* >10*  --  >10* >10* >10*   BACUR 3+* 1+* Rare* 3+* 1+*  1+* None Seen  None Seen 2+*  2+* 1+*  1+* 3+*  3+* None Seen Rare* Rare* 2+* 1+* 3+*  --  1+* None Seen 3+*       Urine Culture Results (last three years):  Lab Results   Component Value Date    URINECUL >100,000 CFU/ML Escherichia coli (A) 04/13/2024    URINECUL >100,000 CFU/ML Escherichia coli (A) 03/14/2024    URINECUL No Growth at 18-24 hrs. 01/03/2024    URINECUL  12/04/2023     ,000 cfu/ml Multiple species present- probable contamination.    URINECUL >100,000 CFU/ML Escherichia coli (A) 12/04/2023    URINECUL >100,000 CFU/ML Escherichia coli (A) 11/07/2023    URINECUL   09/27/2023     ,000 cfu/ml Multiple species present- probable contamination.    URINECUL No Growth at 18-24 hrs. 06/09/2023    URINECUL >100,000 CFU/ML Stenotrophomonas maltophilia (A) 05/15/2023    URINECUL >100,000 CFU/ML Enterococcus faecalis NOT VRE (A) 05/15/2023    URINECUL >100,000 CFU/ML Enterococcus faecalis VRE (A) 05/03/2023    URINECUL (A) 05/03/2023     10,000 - 50,000 CFU/ML Stenotrophomonas maltophilia    URINECUL >100,000 CFU/ML Proteus mirabilis (A) 01/27/2023    URINECUL 50,000-99,000 CFU/ML Proteus mirabilis (A) 08/25/2022    URINECUL (A) 08/01/2022     10,000 - 50,000 CFU/ML Staphylococcus species, not aureus       PSA:  Lab Results   Component Value Date    PSAS 6.04 (H) 09/30/2021        Imaging (last three days):  No results found.       Impression:   Jose Eduardo Padilla is a 79 year old male with history of dementia, nephrolithiasis, diabetes, hypertension, incontinence of stool and urine, urinary retention and BPH.  He has been seen by Dr. Hernandez and Cindy in the past.  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  Presents today with urinary retention after patient pulled out Alejo and bladder scan greater than 600 mL.  ED staff unable to replace Alejo catheter after 3 attempts in the ED so urology was consulted.    Unable to place 12 or 18 Fr coude at bedside due to significant resistance a few cm beyond the iatrogenic hypospadiac meatus. Exam shows urethral erosion down to base of the penis. Patient required restraints and sedation for Alejo attempt.     Reviewed prior CT from 2023 - good window to bladder above pubic bone.    Spoke with daughter Renay on the phone about options for cystoscopy, urethral dilation, urethral vs. SP Alejo placement. She elects for SPT placement.    Recommendations:   - OR for cystoscopy, possible urethral dilation and Alejo placement, suprapubic tube placement      I have personally reviewed all  relevant medical records, labs, and imaging.    Thank you for this consult. Please call if there are any questions or concerns.      Medical Decision Making  Urinary retention: Undiagnosed new problem  Urethral stricture: Undiagnosed new problem  Urethral erosion: Chronic problem    Amount and/or Complexity of Data Reviewed  External Data Reviewed: labs and notes.    Risk  Minor surgery with identified risk factors.        Hank Grigsby MD  Staff Urologist  [facility]  Office: [phone]               [1] No Known Allergies

## 2024-11-01 NOTE — ED INITIAL ASSESSMENT (HPI)
Pt arrived via EMS from home d/t abdominal pain. Pt is a poor historian and keeps saying \"pain\". Per EMS, Aox1. This RN will call the pt's daughter

## 2024-11-01 NOTE — ED QUICK NOTES
Spoke with Pre-Op RN and report given. All questions answered. I stated this RN would do an Accu check and daughter will be called for update on POC

## 2024-11-01 NOTE — ED PROVIDER NOTES
Patient Seen in: [facility] Emergency Department    History     Chief Complaint   Patient presents with    Abdominal Pain     Stated Complaint: abdominal pain     HPI    79-year-old male with past medical history dementia diabetes hypertension, recurrence urinary tension/UTI with prior indwelling Alejo catheter presenting by EMS for evaluation of suprapubic abdominal pain as noted this morning.  History limited by clinical condition, obtained from family by phone.    Past Medical History:    BPH (benign prostatic hyperplasia)    Calculus of kidney    Colon, diverticulosis    Dementia (HCC)    Diabetes (HCC)    Diabetes mellitus type 2, controlled (HCC)    High blood pressure    Incontinence    Incontinence of feces    Prediabetes    borderline    Recurrent UTI    Tongue cancer (HCC)    Vitamin B12 deficiency       Past Surgical History:   Procedure Laterality Date    Other surgical history  2011    tongue cancer surgery, [place], no radiation/ chemo    Tongue and mouth surg unlisted      To remove Tongue CA '11            Family History   Problem Relation Age of Onset    Cancer Mother     Cancer Sister        Social History     Socioeconomic History    Marital status:    Tobacco Use    Smoking status: Never    Smokeless tobacco: Never   Vaping Use    Vaping status: Never Used   Substance and Sexual Activity    Alcohol use: No    Drug use: No     Social Drivers of Health     Financial Resource Strain: Low Risk  (5/17/2023)    Financial Resource Strain     Difficulty of Paying Living Expenses: Not very hard     Med Affordability: No   Transportation Needs: No Transportation Needs (5/17/2023)    Transportation Needs     Lack of Transportation: No    [facility]       Review of Systems :  Constitutional: As per HPI  Gastrointestinal: Negative for vomiting; (+) abdominal pain.   Genitourinary: Negative for dysuria and hematuria.     Positive for stated complaint:  abdominal pain  Other systems are as noted in HPI.  Constitutional and vital signs reviewed.      All other systems reviewed and negative except as noted above.    PSFH elements reviewed from today and agreed except as otherwise stated in HPI.    Physical Exam     ED Triage Vitals [11/01/24 1107]   BP (!) 118/106   Pulse 111   Resp 20   Temp 98.1 °F (36.7 °C)   Temp src Temporal   SpO2 99 %   O2 Device None (Room air)       Current:BP (!) 118/106   Pulse 111   Temp 98.1 °F (36.7 °C) (Temporal)   Resp 20   SpO2 99%         Physical Exam   Constitutional: Chronically ill-appearing.  HEENT: MMM.  Head: Normocephalic.   Eyes: No injection.   Cardiovascular: RRR.   Pulmonary/Chest: Effort normal. CTAB.  Abdominal: Soft. Suprapubic fullness/tenderness; no CVA/flank tenderness..  : No obvious hematuria/dysuria; hypospadias without infectious change noted; bladder scan with >600mL.  Musculoskeletal: No gross deformity.  Neurological: Alert.   Skin: Skin is warm.   Psychiatric: Cooperative.  Nursing note and vitals reviewed.        ED Course     Labs Reviewed   URINALYSIS, ROUTINE   CBC WITH DIFFERENTIAL WITH PLATELET   BASIC METABOLIC PANEL (8)   TYPE AND SCREEN   URINE CULTURE, ROUTINE       ED Course as of 11/01/24 1554  ------------------------------------------------------------  Time: 11/01 1138  Comment: Bladder scan with >600mL noted - [facility] place Alejo.  ------------------------------------------------------------  Time: 11/01 1207  Comment: Patient somewhat agitated similar to prior with Alejo unable to be placed - will initiate calming medication to facilitate Alejo placement.  ------------------------------------------------------------  Time: 11/01 1249  Comment: Unable to place Alejo despite calming medication - will reattempt with additional meds/Coude.  ------------------------------------------------------------  Time: 11/01 3094  Comment: Unable to place/pass Coude catheter- will d/w  urology.  ------------------------------------------------------------  Time: 11/01 1410  Comment: Case d/w urology Dr. Calista Ruvalcaba to graciously evaluate in ED.  ------------------------------------------------------------  Time: 11/01 1520  Comment: Urology graciously evaluating,  unable to pass Alejo.  ------------------------------------------------------------  Time: 11/01 1531  Comment: ED course with urology unable to place Alejo in ED, plan for evaluation/management in OR; hospitalist Dr. Seo notified/updated of ED course should admission be required.       MDM   DIFFERENTIAL DIAGNOSIS: After history and physical exam differential diagnosis includes but is not limited to urinary retention.    Pulse ox: 99%:Normal on RA, as independently interpreted by myself    Medical Decision Making  Evaluation for urinary retention in setting of prior retention with known hypospadias - unable to place Alejo in ED despite calming meds/Coude use, urology graciously evaluating in ED and unable to pass catheter with plan for OR evaluation/management; case d/w hospitalist Dr. Seo for possible admission pending OR course.    Problems Addressed:  Dementia with agitation, unspecified dementia severity, unspecified dementia type (HCC): chronic illness or injury  Difficult Alejo catheter placement (HCC): acute illness or injury  Urinary retention: acute illness or injury    Amount and/or Complexity of Data Reviewed  Independent Historian: caregiver and EMS     Details: Collateral history obtained from EMS/family  External Data Reviewed: labs, radiology and notes.     Details: 12/4/2023 ED note/CT/labs reviewed  Labs: ordered.  Discussion of management or test interpretation with external provider(s): Case d/w urology APN Kiara/Jaquan in consultation    Risk  Elective major surgery with identified risk factors.      I was wearing at minimum a facemask and eye protection throughout this encounter with handwashing  performed prior and after patient evaluation without personal hand/facial/oropharyngeal contact and gloves worn throughout encounter. See note and/or contact this provider for further PPE details.    Disposition and Plan     Clinical Impression:  1. Urinary retention    2. Difficult Alejo catheter placement (HCC)    3. Dementia with agitation, unspecified dementia severity, unspecified dementia type (HCC)        Disposition:  Send to or/cath/gi    Follow-up:  No follow-up provider specified.    Medications Prescribed:  Current Discharge Medication List

## 2024-11-01 NOTE — ED QUICK NOTES
Spoke with daughter over the phone, she is aware of the surgery for the pt; MD called and informed her. All questions answered.

## 2024-11-02 ENCOUNTER — TELEPHONE (OUTPATIENT)
Dept: SURGERY | Facility: CLINIC | Age: 79
End: 2024-11-02

## 2024-11-02 LAB
ANION GAP SERPL CALC-SCNC: 7 MMOL/L (ref 0–18)
BASOPHILS # BLD AUTO: 0.05 X10(3) UL (ref 0–0.2)
BASOPHILS NFR BLD AUTO: 0.4 %
BUN BLD-MCNC: 16 MG/DL (ref 9–23)
BUN/CREAT SERPL: 19.8 (ref 10–20)
CALCIUM BLD-MCNC: 9.4 MG/DL (ref 8.7–10.4)
CHLORIDE SERPL-SCNC: 110 MMOL/L (ref 98–112)
CO2 SERPL-SCNC: 26 MMOL/L (ref 21–32)
CREAT BLD-MCNC: 0.81 MG/DL
DEPRECATED RDW RBC AUTO: 42.9 FL (ref 35.1–46.3)
EGFRCR SERPLBLD CKD-EPI 2021: 90 ML/MIN/1.73M2 (ref 60–?)
EOSINOPHIL # BLD AUTO: 0.02 X10(3) UL (ref 0–0.7)
EOSINOPHIL NFR BLD AUTO: 0.2 %
ERYTHROCYTE [DISTWIDTH] IN BLOOD BY AUTOMATED COUNT: 13.8 % (ref 11–15)
GLUCOSE BLD-MCNC: 117 MG/DL (ref 70–99)
GLUCOSE BLDC GLUCOMTR-MCNC: 102 MG/DL (ref 70–99)
GLUCOSE BLDC GLUCOMTR-MCNC: 102 MG/DL (ref 70–99)
GLUCOSE BLDC GLUCOMTR-MCNC: 112 MG/DL (ref 70–99)
GLUCOSE BLDC GLUCOMTR-MCNC: 92 MG/DL (ref 70–99)
HCT VFR BLD AUTO: 37.7 %
HGB BLD-MCNC: 12.5 G/DL
IMM GRANULOCYTES # BLD AUTO: 0.06 X10(3) UL (ref 0–1)
IMM GRANULOCYTES NFR BLD: 0.5 %
LYMPHOCYTES # BLD AUTO: 1.53 X10(3) UL (ref 1–4)
LYMPHOCYTES NFR BLD AUTO: 11.7 %
MCH RBC QN AUTO: 28.2 PG (ref 26–34)
MCHC RBC AUTO-ENTMCNC: 33.2 G/DL (ref 31–37)
MCV RBC AUTO: 85.1 FL
MONOCYTES # BLD AUTO: 1.18 X10(3) UL (ref 0.1–1)
MONOCYTES NFR BLD AUTO: 9.1 %
NEUTROPHILS # BLD AUTO: 10.19 X10 (3) UL (ref 1.5–7.7)
NEUTROPHILS # BLD AUTO: 10.19 X10(3) UL (ref 1.5–7.7)
NEUTROPHILS NFR BLD AUTO: 78.1 %
OSMOLALITY SERPL CALC.SUM OF ELEC: 298 MOSM/KG (ref 275–295)
PLATELET # BLD AUTO: 241 10(3)UL (ref 150–450)
POTASSIUM SERPL-SCNC: 4 MMOL/L (ref 3.5–5.1)
RBC # BLD AUTO: 4.43 X10(6)UL
SODIUM SERPL-SCNC: 143 MMOL/L (ref 136–145)
WBC # BLD AUTO: 13 X10(3) UL (ref 4–11)

## 2024-11-02 PROCEDURE — 99232 SBSQ HOSP IP/OBS MODERATE 35: CPT | Performed by: SURGERY

## 2024-11-02 PROCEDURE — 99233 SBSQ HOSP IP/OBS HIGH 50: CPT | Performed by: HOSPITALIST

## 2024-11-02 RX ORDER — FINASTERIDE 5 MG/1
5 TABLET, FILM COATED ORAL DAILY
Status: DISCONTINUED | OUTPATIENT
Start: 2024-11-02 | End: 2024-11-04

## 2024-11-02 RX ORDER — DONEPEZIL HYDROCHLORIDE 5 MG/1
10 TABLET, FILM COATED ORAL NIGHTLY
Status: DISCONTINUED | OUTPATIENT
Start: 2024-11-02 | End: 2024-11-04

## 2024-11-02 RX ORDER — FINASTERIDE 5 MG/1
5 TABLET, FILM COATED ORAL DAILY
Qty: 90 TABLET | Refills: 6 | Status: SHIPPED | OUTPATIENT
Start: 2024-11-02

## 2024-11-02 NOTE — PROGRESS NOTES
Completed cystoscopy, urethral dilation, clot evacuation from the bladder, prostate fulguration, suprapubic tube placement.    Plan:  Run CBI per urethral Alejo catheter.  Will consider clamp trial once urine clears eventually remove urethral Alejo catheter prior to discharge.  At this time will attach suprapubic tube to drainage.  Return to urology clinic for first suprapubic tube change and drain stitch removal in 1 month.

## 2024-11-02 NOTE — H&P
[facility]    PATIENT'S NAME: EVARISTO AGUIRRE   ATTENDING PHYSICIAN: Hank Grigsby MD   PATIENT ACCOUNT#:   [id number]    LOCATION:  [facility] PACU 3 [facility] 10  MEDICAL RECORD #:   [id number]       YOB: 1945  ADMISSION DATE:       11/01/2024    HISTORY AND PHYSICAL EXAMINATION    CHIEF COMPLAINT:  Urinary retention.    HISTORY OF PRESENT ILLNESS:  The patient is a 79-year-old [place] male with chronic enlarged prostate and urine outlet obstruction.  His Alejo catheter was discontinued because he was able to void on his own.  Today, he was sent to the emergency department for inability to void for unknown amount of time.  Multiple attempts to insert the Alejo catheter were nonsuccessful because of meatal deformity and hypospadias.  Patient will be admitted to the hospital, taken to the operating room by Urology for further catheter versus suprapubic catheter insertion.    PAST MEDICAL HISTORY:  Benign prostate hypertrophy with chronic lower urinary tract obstructive symptoms, dementia, left kidney staghorn calculus with recurrent urinary tract infections, hypertension, diabetes mellitus type 2, diverticulosis.    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    MEDICATIONS:  Please see medication reconciliation list.     ALLERGIES:  No known drug allergies.    FAMILY HISTORY:  Mother had liver cancer.    SOCIAL HISTORY:  No tobacco, alcohol, or drug use.  Lives with his family.  Requires assistance in his basic activities of daily living.     REVIEW OF SYSTEMS:  Difficult to obtain from the patient.  He has dementia, not able to provide any details.       PHYSICAL EXAMINATION:    GENERAL:  Patient is currently sedated.  VITAL SIGNS:  Temperature 98.1, pulse 93, respiratory rate 18, blood pressure 184/80, pulse ox 100% on room air.    HEENT:  Atraumatic.  Oropharynx clear.  Dry mucous membranes.  Ears, nose normal.   NECK:   Supple.  No lymphadenopathy.   LUNGS:  Clear to auscultation bilaterally.  Normal respiratory effort.   HEART:  Regular rate, rhythm.  S1 and S2 auscultated.  No murmur.  ABDOMEN:  Soft, nondistended.  No tenderness.   EXTREMITIES:  No edema, clubbing, or cyanosis.  Mild muscle contractures.    ASSESSMENT:    1.   Urinary retention.  2.   Essential hypertension.  3.   Dementia.    PLAN:  Patient will be taken to the OR by Urology for cystoscopy and possible suprapubic catheter placement.  Will continue to monitor his hemodynamic status postoperatively.  Use IV hydralazine p.r.n. for blood pressure control.  Resume his home medications postoperatively.  IV Rocephin.  Urine culture once obtained.  Further recommendations to follow.     Dictated By Leena Seo MD  d: 11/01/2024 15:59:39  t: 11/01/2024 20:16:02  [facility] [id number]  FB/

## 2024-11-02 NOTE — PLAN OF CARE
Patient admitted from PACU. PT drowsy, alert to self. Primarily Armenian speaking. CBI running slow, clear yellow urine, no clots. Pt max assist. RAJorge HARRIS. Daughter called for admission questions. Safety precautions in place.     Problem: Risk for Violence/Aggression  Goal: Absence of Violence/Aggression  Description: INTERVENTIONS:   - Identify precipitating factors for behavior   - Notify Charge RN/Provider   - Consider decreasing stimulation   - Consider distraction measures   - Consider discussion with provider regarding prn meds    - Consider SANDRINE (Moderate Risk only)   - Consider Code Support (High Risk only)   - Consider room safety checks   - Consider restraints  Outcome: Progressing     Problem: Patient Centered Care  Goal: Patient preferences are identified and integrated in the patient's plan of care  Description: Interventions:  - What would you like us to know as we care for you? Lives with son and daughter in law- per daughter  - Provide timely, complete, and accurate information to patient/family  - Incorporate patient and family knowledge, values, beliefs, and cultural backgrounds into the planning and delivery of care  - Encourage patient/family to participate in care and decision-making at the level they choose  - Honor patient and family perspectives and choices  Outcome: Progressing

## 2024-11-02 NOTE — PROGRESS NOTES
[facility]  Progress Note     Jose Eduardo Padilla  : 1945    Status: Inpatient  Day #: 1    Attending: Musa Epstein MD  PCP: Vito Plata MD     Assessment and Plan:    Acute urinary retention  Acute E coli UTI  -urology on consult  -urine clear  -cont CBI per urology  -finasteride  -cont IV abx    Dementia  -monitor mental status. stable        DVT Mechanical Prophylaxis:   SCDs,    DVT Pharmacologic Prophylaxis   Medication   None               Subjective:      Initial Chief Complaint:  urinary retention    Patient with dementia. Denies pain or discomfort    Objective:      Temp:  [96.9 °F (36.1 °C)-98.4 °F (36.9 °C)] 98.3 °F (36.8 °C)  Pulse:  [] 78  Resp:  [10-22] 18  BP: (102-189)/() 102/52  SpO2:  [95 %-100 %] 98 %  General:  Alert, no distress  HEENT:  Normocephalic, atraumatic  Cardiac:  Regular rate, regular rhythm  Pulmonary:  Clear to auscultation bilaterally, respirations unlabored  Gastrointestinal:  Soft, non-tender, normal bowel sounds  Musculoskeletal:  No joint swelling  Extremities:  No edema, no cyanosis, no clubbing  Neurologic:  nonfocal  Psychiatric:  Normal affect, calm and appropriate    Intake/Output Summary (Last 24 hours) at 2024 1347  Last data filed at 2024 1231  Gross per 24 hour   Intake 1680 ml   Output 4950 ml   Net -3270 ml         Recent Labs   Lab 24  1608 24  0555   WBC 13.7* 13.0*   HGB 13.8 12.5*   HCT 40.0 37.7*   .0 241.0   RBC 4.68 4.43   MCV 85.5 85.1   MCH 29.5 28.2   MCHC 34.5 33.2   RDW 14.2 13.8   NEPRELIM 12.56* 10.19*     Recent Labs   Lab 24  2215 24  0555   BUN 16 16   CREATSERUM 0.85 0.81   CA 9.6 9.4    143   K 4.8 4.0    110   CO2 23.0 26.0   * 117*       No results found.      Medications:   finasteride  5 mg Oral Daily    cefTRIAXone  2 g Intravenous Q24H      PRN Meds:   acetaminophen    ondansetron    OLANZapine (Zyprexa) 5 mg in sterile water for injection (PF) IM  injection    hydrALAzine    Supplementary Documentation:   DVT Mechanical Prophylaxis:   SCDs,    DVT Pharmacologic Prophylaxis   Medication   None                Code Status: Full Code  Alejo: Alejo catheter in place  Suprapubic catheter in place  Alejo Duration (in days): 2  Central line:    PRAFUL:                         MDM High. Time spent on chart/note review, review of labs/imaging, discussion with patient, physical exam, discussion with staff, consultants, coordinating care, writing progress note, discussion of plan of care.      Musa Epstein MD

## 2024-11-02 NOTE — PROGRESS NOTES
Urology Progress Note  [facility]  part of [facility]      Jose Eduardo Padilla Patient Status:  Observation    1945 MRN [id number]   Location [facility] 4W/SW/SE Attending Musa Epstein MD   Hosp Day # 0 PCP Vito Plata MD     History of Present Illness:   No acute events overnight  Afebrile, normal vital signs  Cr 0.81  WBC 13.0 from 13.7  UCx in process  Urethral Alejo draining pink tinged urine on slow drip CBI  SPT capped and secured under dressing  Denies pain    Physical Exam:   Vital Signs:  Blood pressure 120/68, pulse 73, temperature 98.4 °F (36.9 °C), temperature source Oral, resp. rate 18, height 5' 2\" (1.575 m), weight 145 lb (65.8 kg), SpO2 98%.     CONSTITUTIONAL: Well developed, well nourished, in no acute distress  NEUROLOGIC: Alert and oriented  HEAD: Normocephalic, atraumatic  EYES: Sclera non-icteric  ENT: Hearing intact, moist mucous membranes  NECK: No obvious goiter or masses  RESPIRATORY: Normal respiratory effort  SKIN: No evident rashes  GENITOURINARY: Urethral Alejo draining pink tinged urine on slow drip CBI, SPT capped and secured under dressing    Laboratory Data:  Lab Results   Component Value Date    WBC 13.0 (H) 2024    HGB 12.5 (L) 2024    .0 2024     Lab Results   Component Value Date     2024    K 4.0 2024     2024    CO2 26.0 2024    BUN 16 2024     (H) 2024    GFRAA 102 2022    AST 11 2024    ALT 7 (L) 2024    TP 8.2 2024    ALB 4.0 2024    PHOS 3.0 2022    CA 9.4 2024    MG 2.0 2023       Urine Culture Results (last three years):  Lab Results   Component Value Date    URINECUL >100,000 CFU/ML Escherichia coli (A) 2024    URINECUL >100,000 CFU/ML Escherichia coli (A) 2024    URINECUL No Growth at 18-24 hrs. 2024    URINECUL  2023     ,000 cfu/ml Multiple species present- probable  contamination.    URINECUL >100,000 CFU/ML Escherichia coli (A) 12/04/2023    URINECUL >100,000 CFU/ML Escherichia coli (A) 11/07/2023    URINECUL  09/27/2023     ,000 cfu/ml Multiple species present- probable contamination.    URINECUL No Growth at 18-24 hrs. 06/09/2023    URINECUL >100,000 CFU/ML Stenotrophomonas maltophilia (A) 05/15/2023    URINECUL >100,000 CFU/ML Enterococcus faecalis NOT VRE (A) 05/15/2023    URINECUL >100,000 CFU/ML Enterococcus faecalis VRE (A) 05/03/2023    URINECUL (A) 05/03/2023     10,000 - 50,000 CFU/ML Stenotrophomonas maltophilia    URINECUL >100,000 CFU/ML Proteus mirabilis (A) 01/27/2023    URINECUL 50,000-99,000 CFU/ML Proteus mirabilis (A) 08/25/2022    URINECUL (A) 08/01/2022     10,000 - 50,000 CFU/ML Staphylococcus species, not aureus       PSA:  Lab Results   Component Value Date    PSAS 6.04 (H) 09/30/2021        Imaging (last three days):  No results found.     Assessment:   Jose Eduardo Padilla is a 79 year old male with history of dementia, nephrolithiasis, diabetes, hypertension, incontinence of stool and urine, urinary retention and BPH.  He has been seen by Dr. Hernandez and Cindy in the past.  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  Presents today with urinary retention after patient pulled out Alejo and bladder scan greater than 600 mL.  ED staff unable to replace Alejo catheter after 3 attempts in the ED so urology was consulted.     Unable to place 12 or 18 Fr coude at bedside due to significant resistance a few cm beyond the iatrogenic hypospadiac meatus. Exam shows urethral erosion down to base of the penis. Patient required restraints and sedation for Alejo attempt.      Reviewed prior CT from 2023 - good window to bladder above pubic bone.     OR 11/1/24 for cystoscopy, urethral dilation, bladder clot evacuation and fulguration of prostate, SP tube placement.    Plan:   - Start finasteride  - Keep  SPT capped under dressing  - Continue slow drip CBI for one more night, then CBI clamp trial tomorrow morning  - If urine remains without significant gross hematuria tomorrow after CBI clamp trial can remove urethral Alejo and attach SP tube to drainage bag and discharge   - Will need first SP tube change in urology clinic setup    I have personally reviewed all relevant medical records, labs, and imaging.    In total, 35 minutes were spent on this patient encounter (including chart review, patient history, physical, and counseling, documentation, and communication).       Hank Grigsby MD  Staff Urologist  [facility]  Office: [phone]

## 2024-11-02 NOTE — DISCHARGE INSTRUCTIONS
Urology Instructions:    - No heavy lifting or strenuous activity for 1 week.     - Be careful of your suprapubic tube and ensure it does not get pulled on. If it should accidentally fall out or stops draining, you should notify your urologist right away.      - You will be provided with two urine collection bags of different sizes. Please use the larger bag while at home and sleeping. You may use the smaller leg bag under your pants while outside of the house. The leg bag usually lasts about 3-4 hours before needing to be emptied, but of course this varies with how much liquid you consume.  The larger bag should last you all night, so you do not need to wake up to empty it.  Remove, empty, and exchange these two bags as needed.    - Please keep Alejo bag below the level of your bladder to allow urine to drain properly and to prevent urinary tract infection.    - The office will contact you to make your post-operative clinic appointment in 1 month for your first catheter exchange, if not already set up. If you do not hear from the clinic after a few days or you need to change your appointment time or date, please contact us at [phone].     - You may experience mild pain after the procedure for a few days.  If the pain becomes intolerable please contact our office or go to the nearest Emergency Room or Urgent Care. You should take over the counter ibuprofen (AKA motrin, advil) for mild pain (provided you do not have a medical condition such as stomach ulcers or kidney disease which prohibits you from taking these). You may alternate this with tylenol as well. If pain is still not relieved by tylenol and/or ibuprofen, you may take narcotic pain medication if prescribed (typically oxycodone or tramadol). If you are taking narcotic pain medication this can make you constipated, so you should take over the counter stool softeners or miralax if prescribed.    - Warm pack or hot baths often help with discomfort after  cystoscopy.    - If you take blood thinners (such as aspirin or plavix) please hold these medications until 3 days after surgery.     - You may experience burning and frequency of urination over the next few days. This will improve after a few days if you stay well hydrated.     - You are likely to see some blood in your urine (pink or light red urine) that should clear up within a few days. Staying well hydrated should help this clear up. If you notice the urine stays dark red or there are multiple large blood clots despite good hydration, please call the urology clinic ([phone]).     - Try to abstain from alcohol, coffee, tea, artificial sweeteners, and spicy food for the next 48 hours as these can irritate the bladder.     - If you develop fevers / chills, difficulty urinating, or abdominal pain please call the office.     - Drink 1.5 to 2 liters of fluid today (water is preferable). If you are on a fluid restriction due to other medical reasons then you need to adhere to your fluid restriction recommendations.    Hank Grigsby MD  Staff Urologist  [facility]  Office: [phone]    Sometimes managing your health at home requires assistance.  The [facility] team has recognized your preference to use [facility].  They can be reached by phone at [phone].  The fax number for your reference is [phone].  A representative from the home health agency will contact you or your family to schedule your first visit.

## 2024-11-02 NOTE — TELEPHONE ENCOUNTER
Juan Alberto - can you setup for 1 month first SP tube change at [facility] with either you, Cindy, or David?    Thanks,  MB

## 2024-11-02 NOTE — ANESTHESIA POSTPROCEDURE EVALUATION
Patient: Jose Eduardo Padilla    Procedure Summary       Date: 11/01/24 Room / Location: [facility] MAIN OR  / [facility] MAIN OR    Anesthesia Start: 1744 Anesthesia Stop:     Procedures:       Cystoscopy, bladder clot evacuation, prostate fulguration, urethral dilation, suprapubic tube placement (Urethra)      PROSTATECTOMY SUPRAPUBIC (Bladder) Diagnosis: (Urinary retention)    Surgeons: Hank Grigsby MD Anesthesiologist: Leandro Banks MD    Anesthesia Type: general ASA Status: 4 - Emergent            Anesthesia Type: No value filed.    Vitals Value Taken Time   /75 11/01/24 1936   Temp 97.2 °F (36.2 °C) 11/01/24 1934   Pulse 110 11/01/24 1936   Resp 21 11/01/24 1936   SpO2 97 % 11/01/24 1936   Vitals shown include unfiled device data.    [facility] AN Post Evaluation:   Patient Evaluated in PACU  Patient Participation: complete - patient participated  Level of Consciousness: awake  Pain Management: adequate  Airway Patency:patent  Dental exam unchanged from preop  Yes    Cardiovascular Status: acceptable  Respiratory Status: acceptable  Postoperative Hydration acceptable  Comments: Quiet      Leandro Banks MD  11/1/2024 7:37 PM

## 2024-11-03 LAB
ANION GAP SERPL CALC-SCNC: 8 MMOL/L (ref 0–18)
BASOPHILS # BLD AUTO: 0.09 X10(3) UL (ref 0–0.2)
BASOPHILS NFR BLD AUTO: 0.9 %
BUN BLD-MCNC: 19 MG/DL (ref 9–23)
BUN/CREAT SERPL: 24.1 (ref 10–20)
CALCIUM BLD-MCNC: 9.1 MG/DL (ref 8.7–10.4)
CHLORIDE SERPL-SCNC: 108 MMOL/L (ref 98–112)
CO2 SERPL-SCNC: 26 MMOL/L (ref 21–32)
CREAT BLD-MCNC: 0.79 MG/DL
DEPRECATED RDW RBC AUTO: 43.6 FL (ref 35.1–46.3)
EGFRCR SERPLBLD CKD-EPI 2021: 90 ML/MIN/1.73M2 (ref 60–?)
EOSINOPHIL # BLD AUTO: 0.1 X10(3) UL (ref 0–0.7)
EOSINOPHIL NFR BLD AUTO: 1 %
ERYTHROCYTE [DISTWIDTH] IN BLOOD BY AUTOMATED COUNT: 14 % (ref 11–15)
GLUCOSE BLD-MCNC: 101 MG/DL (ref 70–99)
GLUCOSE BLDC GLUCOMTR-MCNC: 100 MG/DL (ref 70–99)
GLUCOSE BLDC GLUCOMTR-MCNC: 124 MG/DL (ref 70–99)
GLUCOSE BLDC GLUCOMTR-MCNC: 93 MG/DL (ref 70–99)
GLUCOSE BLDC GLUCOMTR-MCNC: 95 MG/DL (ref 70–99)
HCT VFR BLD AUTO: 36 %
HGB BLD-MCNC: 11.8 G/DL
IMM GRANULOCYTES # BLD AUTO: 0.03 X10(3) UL (ref 0–1)
IMM GRANULOCYTES NFR BLD: 0.3 %
LYMPHOCYTES # BLD AUTO: 2.14 X10(3) UL (ref 1–4)
LYMPHOCYTES NFR BLD AUTO: 21.7 %
MCH RBC QN AUTO: 27.8 PG (ref 26–34)
MCHC RBC AUTO-ENTMCNC: 32.8 G/DL (ref 31–37)
MCV RBC AUTO: 84.9 FL
MONOCYTES # BLD AUTO: 1.03 X10(3) UL (ref 0.1–1)
MONOCYTES NFR BLD AUTO: 10.4 %
NEUTROPHILS # BLD AUTO: 6.49 X10 (3) UL (ref 1.5–7.7)
NEUTROPHILS # BLD AUTO: 6.49 X10(3) UL (ref 1.5–7.7)
NEUTROPHILS NFR BLD AUTO: 65.7 %
OSMOLALITY SERPL CALC.SUM OF ELEC: 296 MOSM/KG (ref 275–295)
PLATELET # BLD AUTO: 226 10(3)UL (ref 150–450)
POTASSIUM SERPL-SCNC: 3.5 MMOL/L (ref 3.5–5.1)
RBC # BLD AUTO: 4.24 X10(6)UL
SODIUM SERPL-SCNC: 142 MMOL/L (ref 136–145)
WBC # BLD AUTO: 9.9 X10(3) UL (ref 4–11)

## 2024-11-03 PROCEDURE — 99233 SBSQ HOSP IP/OBS HIGH 50: CPT | Performed by: HOSPITALIST

## 2024-11-03 RX ORDER — CEPHALEXIN 500 MG/1
500 CAPSULE ORAL 3 TIMES DAILY
Qty: 15 CAPSULE | Refills: 0 | Status: SHIPPED | OUTPATIENT
Start: 2024-11-03 | End: 2024-11-08

## 2024-11-03 NOTE — DISCHARGE SUMMARY
[facility]  Discharge Summary     Jose Eduardo Padilla  : 1945    Status: Inpatient  Day #: 3    Attending: Musa Epstein MD  PCP: Vito Plata MD     Date of Admission:  2024  Date of Discharge:  2024     Hospital Discharge Diagnoses:     Acute urinary retention  Acute E coli UTI  BPH  Dementia      History of Present Illness:     Copied from Admission H&P:    The patient is a 79-year-old [place] male with chronic enlarged prostate and urine outlet obstruction. His Eddy catheter was discontinued because he was able to void on his own. Today, he was sent to the emergency department for inability to void for unknown amount of time. Multiple attempts to insert the Eddy catheter were nonsuccessful because of meatal deformity and hypospadias. Patient will be admitted to the hospital, taken to the operating room by Urology for further catheter versus suprapubic catheter insertion.       Hospital Course:     Acute urinary retention  Acute E coli UTI  BPH  -urology on consult  -s/p cystoscopy, urethral dilation, bladder clot evacuation and fulguration of prostate, SP tube placement on   -s/p CBI  -urine clear  -finasteride started  -cont IV abx-> oral on discharge  -urethral eddy removed, has suprapubic catheter  -urine clear  -f/u urology     Dementia  -monitor mental status. Stable     Consultants         Provider   Role Specialty     Mariah Cronin PA-C      Consulting Physician Physician Assistant          Surgical Procedures       Case IDs Date Procedure Surgeon Location Status    [id number] Cystoscopy, bladder clot evacuation, prostate fulguration, urethral dilation, suprapubic tube placement Hank Grigsby MD [facility] MAIN OR [facility]           Physical Exam:   Blood pressure 127/62, pulse 68, temperature 98.1 °F (36.7 °C), temperature source Oral, resp. rate 16, height 5' 2\" (1.575 m), weight 145 lb (65.8 kg), SpO2 97%.  General:  Alert, no distress  HEENT:  Normocephalic,  atraumatic  Cardiac:  Regular rate, regular rhythm  Pulmonary:  Clear to auscultation bilaterally, respirations unlabored  Gastrointestinal:  Soft, non-tender, normal bowel sounds  Musculoskeletal:  No joint swelling  Extremities:  No edema, no cyanosis, no clubbing  Neurologic:  nonfocal  Psychiatric:  Normal affect, calm and appropriate         Discharge Medications        START taking these medications        Instructions Prescription details   cephALEXin 500 MG Caps  Commonly known as: Keflex      Take 1 capsule (500 mg total) by mouth 3 (three) times daily for 5 days.   Stop taking on: November 8, 2024  Quantity: 15 capsule  Refills: 0     finasteride 5 MG Tabs  Commonly known as: Proscar      Take 1 tablet (5 mg total) by mouth daily.   Quantity: 90 tablet  Refills: 6            CONTINUE taking these medications        Instructions Prescription details   acetaminophen 500 MG Tabs  Commonly known as: Tylenol Extra Strength      Take 1 tablet (500 mg total) by mouth every 6 (six) hours as needed for Pain or Fever.   Quantity: 100 tablet  Refills: 1     Diapers & Supplies Misc      Adult diapers   Quantity: 100 Device  Refills: 3     donepezil 10 MG Tabs  Commonly known as: Aricept      Take 1 tablet (10 mg total) by mouth nightly.   Quantity: 90 tablet  Refills: 0     Melatonin 5 MG Tabs      Take 1 tablet (5 mg total) by mouth nightly. For sleep   Refills: 0            STOP taking these medications      Accu-Chek Francine Plus Strp        tamsulosin 0.4 MG Caps  Commonly known as: Flomax                  Where to Get Your Medications        These medications were sent to [id number] - [place], [place] - [address] AT [facility], [phone], [phone]  [address] [place] [address]      Phone: [phone]   cephALEXin 500 MG Caps  finasteride 5 MG Tabs        Follow-up Information       Eliazar Hernandez MD Follow up.    Specialty: UROLOGY  Contact information:  [address]  [facility] [address]  [facility] [address]  [phone]                             Hospital Discharge Diagnoses:  Acute urinary retention    Lace+ Score: 65  59-90 High Risk  29-58 Medium Risk  0-28   Low Risk.    TCM Follow-Up Recommendation:  LACE > 58: High Risk of readmission after discharge from the hospital.        I spent >30 minutes on this discharge. Discussed treatment and discharge plans.       Musa Epstein MD

## 2024-11-03 NOTE — PROGRESS NOTES
[facility]  Progress Note     Jose Eduardo Padilla  : 1945    Status: Inpatient  Day #: 2    Attending: Musa Epstein MD  PCP: Vito Plata MD     Assessment and Plan:    Acute urinary retention  Acute E coli UTI  BPH  -urology on consult  -s/p cystoscopy, urethral dilation, bladder clot evacuation and fulguration of prostate, SP tube placement on   -cont CBI held this morning  -urine clear  -finasteride started  -cont IV abx-> oral on discharge    Dementia  -monitor mental status. Stable    Dispo:  possible discharge if ok with urology        DVT Mechanical Prophylaxis:   SCDs,    DVT Pharmacologic Prophylaxis   Medication   None               Subjective:      Initial Chief Complaint:  urinary retention    Patient with dementia. Denies pain or discomfort    Objective:      Temp:  [98.3 °F (36.8 °C)-98.8 °F (37.1 °C)] 98.8 °F (37.1 °C)  Pulse:  [73-78] 78  Resp:  [18] 18  BP: (102-138)/(52-76) 138/76  SpO2:  [97 %-99 %] 97 %  General:  Alert, no distress  HEENT:  Normocephalic, atraumatic  Cardiac:  Regular rate, regular rhythm  Pulmonary:  Clear to auscultation bilaterally, respirations unlabored  Gastrointestinal:  Soft, non-tender, normal bowel sounds  Musculoskeletal:  No joint swelling  Extremities:  No edema, no cyanosis, no clubbing  Neurologic:  nonfocal  Psychiatric:  Normal affect, calm and appropriate    Intake/Output Summary (Last 24 hours) at 11/3/2024 1127  Last data filed at 11/3/2024 0701  Gross per 24 hour   Intake 500 ml   Output 2700 ml   Net -2200 ml         Recent Labs   Lab 24  1608 24  0555 24  0625   WBC 13.7* 13.0* 9.9   HGB 13.8 12.5* 11.8*   HCT 40.0 37.7* 36.0*   .0 241.0 226.0   RBC 4.68 4.43 4.24   MCV 85.5 85.1 84.9   MCH 29.5 28.2 27.8   MCHC 34.5 33.2 32.8   RDW 14.2 13.8 14.0   NEPRELIM 12.56* 10.19* 6.49     Recent Labs   Lab 24  2215 24  0555 24  0625   BUN 16 16 19   CREATSERUM 0.85 0.81 0.79   CA 9.6 9.4 9.1     143 142   K 4.8 4.0 3.5    110 108   CO2 23.0 26.0 26.0   * 117* 101*       No results found.      Medications:   finasteride  5 mg Oral Daily    donepezil  10 mg Oral Nightly    melatonin  5 mg Oral Nightly    cefTRIAXone  2 g Intravenous Q24H      PRN Meds:   acetaminophen    ondansetron    OLANZapine (Zyprexa) 5 mg in sterile water for injection (PF) IM injection    hydrALAzine    Supplementary Documentation:   DVT Mechanical Prophylaxis:   SCDs,    DVT Pharmacologic Prophylaxis   Medication   None                Code Status: Full Code  Alejo: Alejo catheter in place  Suprapubic catheter in place  Alejo Duration (in days): 2  Central line:    PRAFUL: 11/3/2024                        [facility] High. Time spent on chart/note review, review of labs/imaging, discussion with patient, physical exam, discussion with staff, consultants, coordinating care, writing progress note, discussion of plan of care.      Musa Epstein MD

## 2024-11-03 NOTE — PROGRESS NOTES
[facility] reviewed [facility] plan and has no additional suggestions for management of patient behaviors. Treatment team can consult psychiatry for medication management if behavioral interventions prove insufficient.

## 2024-11-03 NOTE — PLAN OF CARE
Problem: Patient Centered Care  Goal: Patient preferences are identified and integrated in the patient's plan of care  Description: Interventions:  - What would you like us to know as we care for you?  Home with family   - Provide timely, complete, and accurate information to patient/family  - Incorporate patient and family knowledge, values, beliefs, and cultural backgrounds into the planning and delivery of care  - Encourage patient/family to participate in care and decision-making at the level they choose  - Honor patient and family perspectives and choices  Outcome: Progressing   Patient alert to self, able to follow commends, calm. CBI in place at slow rate, urine output, yellow, clear. Suprapubic cath in place. Patient denies pain. Continue IV Abx. Fall precautions in place.

## 2024-11-03 NOTE — PLAN OF CARE
Problem: Risk for Violence/Aggression  Goal: Absence of Violence/Aggression  Description: INTERVENTIONS:   - Identify precipitating factors for behavior   - Notify Charge RN/Provider   - Consider decreasing stimulation   - Consider distraction measures   - Consider discussion with provider regarding prn meds    - Consider SANDRINE (Moderate Risk only)   - Consider Code Support (High Risk only)   - Consider room safety checks   - Consider restraints  11/3/2024 0304 by Camille Garcia RN  Outcome: Progressing  11/3/2024 0300 by Camille Garcia RN  Outcome: Progressing     Problem: Patient Centered Care  Goal: Patient preferences are identified and integrated in the patient's plan of care  Description: Interventions:  - What would you like us to know as we care for you?   - Provide timely, complete, and accurate information to patient/family  - Incorporate patient and family knowledge, values, beliefs, and cultural backgrounds into the planning and delivery of care  - Encourage patient/family to participate in care and decision-making at the level they choose  - Honor patient and family perspectives and choices  11/3/2024 0304 by Camille Garcia RN  Outcome: Progressing  11/3/2024 0300 by Camille Garcia RN  Outcome: Progressing     Problem: Patient/Family Goals  Goal: Patient/Family Long Term Goal  Description: Patient's Long Term Goal: discharge    Interventions:  - achieve baseline urinary function  - urology consult  - strict I&Os  - See additional Care Plan goals for specific interventions  Outcome: Progressing  Goal: Patient/Family Short Term Goal  Description: Patient's Short Term Goal: complete continuous bladder irrigation    Interventions:   - monitor three way catheter system  - chart and monitor fluid intake and output  - keep system clean  - See additional Care Plan goals for specific interventions  Outcome: Progressing

## 2024-11-04 VITALS
DIASTOLIC BLOOD PRESSURE: 64 MMHG | RESPIRATION RATE: 18 BRPM | HEART RATE: 75 BPM | SYSTOLIC BLOOD PRESSURE: 105 MMHG | WEIGHT: 145 LBS | HEIGHT: 62 IN | TEMPERATURE: 98 F | BODY MASS INDEX: 26.68 KG/M2 | OXYGEN SATURATION: 99 %

## 2024-11-04 LAB
GLUCOSE BLDC GLUCOMTR-MCNC: 92 MG/DL (ref 70–99)
GLUCOSE BLDC GLUCOMTR-MCNC: 96 MG/DL (ref 70–99)
NON GYNE INTERPRETATION: NEGATIVE

## 2024-11-04 PROCEDURE — 99239 HOSP IP/OBS DSCHRG MGMT >30: CPT | Performed by: HOSPITALIST

## 2024-11-04 PROCEDURE — 99231 SBSQ HOSP IP/OBS SF/LOW 25: CPT

## 2024-11-04 NOTE — CM/SW NOTE
Department  notified of request for HHC, aidin referrals started. Assigned CM/SW to follow up with pt/family on further discharge planning.     Gisel Copeland, DSC

## 2024-11-04 NOTE — HOME CARE LIAISON
[facility]. Spoke w/ patient's daughter who is agreeable with [facility]. Contact information placed on AVS.

## 2024-11-04 NOTE — PHYSICAL THERAPY NOTE
PHYSICAL THERAPY EVALUATION - INPATIENT     Room Number: 471/471-A  Evaluation Date: 11/4/2024  Type of Evaluation: Initial   Physician Order: PT Eval and Treat    Presenting Problem: abdominal pain, urinary retention, s/p Cystoscopy, bladder clot evacuation, prostate fulguration, urethral dilation, suprapubic tube placement  Co-Morbidities : Benign prostate hypertrophy with chronic lower urinary tract obstructive symptoms, dementia, left kidney staghorn calculus with recurrent urinary tract infections, hypertension, diabetes mellitus type 2, diverticulosis  Reason for Therapy: Mobility Dysfunction and Discharge Planning    PHYSICAL THERAPY ASSESSMENT   Patient is a 79 year old male admitted 11/1/2024 for  abdominal pain, urinary retention, s/p Cystoscopy, bladder clot evacuation, prostate fulguration, urethral dilation, suprapubic tube placement. Prior to admission, patient's baseline is Mod I for functional mobility with use of rolling walker, assist as needed with ADLs per EMR. Patient is currently functioning below baseline with bed mobility, transfers, gait, stair negotiation, standing prolonged periods, and performing household tasks.  Patient is requiring stand-by assist, contact guard assist, and minimal assist as a result of the following impairments: decreased functional strength, decreased endurance/aerobic capacity, impaired standing balance, decreased muscular endurance, cognitive deficits (baseline dementia), and medical status.  Physical Therapy will continue to follow for duration of hospitalization.    Patient will benefit from continued skilled PT Services at discharge to promote prior level of function and safety with additional support and return home with home health PT.    PLAN DURING HOSPITALIZATION  Nursing Mobility Recommendation : 1 Assist  PT Device Recommendation: Rolling walker  PT Treatment Plan: Bed mobility;Body mechanics;Endurance;Energy conservation;Patient education;Gait  training;Strengthening;Stair training;Transfer training;Balance training  Rehab Potential : Good  Frequency (Obs): 5x/week     PHYSICAL THERAPY MEDICAL/SOCIAL HISTORY     Problem List  Principal Problem:    Urinary retention  Active Problems:    Difficult Alejo catheter placement (HCC)    Dementia with agitation, unspecified dementia severity, unspecified dementia type (HCC)    Urine retention    HOME SITUATION  Type of Home: House  Home Layout: Two level  Stairs to Enter : 2   Railing: No    Stairs to Bedroom: 16    Railing: Yes    Lives With: Family (son, DIL, grandchildren)    Drives: No   Patient Regularly Uses: Rolling walker     SUBJECTIVE  \"Okay\"    PHYSICAL THERAPY EXAMINATION   OBJECTIVE  Precautions: Bed/chair alarm; needed (Yoruba speaking)  Fall Risk: Standard fall risk    PAIN ASSESSMENT  Ratin    COGNITION  Overall Cognitive Status:  Impaired  Following Commands:  follows one step commands with increased time and follows one step commands with repetition    RANGE OF MOTION AND STRENGTH ASSESSMENT  Upper extremity ROM and strength are within functional limits   Lower extremity ROM is within functional limits   Lower extremity strength is within functional limits     BALANCE  Static Sitting: Good  Dynamic Sitting: Fair +  Static Standing: Fair  Dynamic Standing: Fair -    AM-PAC '6-Clicks' INPATIENT SHORT FORM - BASIC MOBILITY  How much difficulty does the patient currently have...  Patient Difficulty: Turning over in bed (including adjusting bedclothes, sheets and blankets)?: A Little   Patient Difficulty: Sitting down on and standing up from a chair with arms (e.g., wheelchair, bedside commode, etc.): A Little   Patient Difficulty: Moving from lying on back to sitting on the side of the bed?: A Little   How much help from another person does the patient currently need...   Help from Another: Moving to and from a bed to a chair (including a wheelchair)?: A Little   Help from Another: Need  to walk in hospital room?: A Little   Help from Another: Climbing 3-5 steps with a railing?: A Little     AM-PAC Score:  Raw Score: 18   Approx Degree of Impairment: 46.58%   Standardized Score (AM-PAC Scale): 43.63   CMS Modifier (G-Code): CK    FUNCTIONAL ABILITY STATUS  Functional Mobility/Gait Assessment  Gait Assistance:  (CGA>SBA)  Distance (ft): 150 ft  Assistive Device: Rolling walker  Pattern: Shuffle (decreased janetet speed, decreased step length, slightly flexed posture)  Supine to Sit: minimal assist  Sit to Stand: contact guard assist    Exercise/Education Provided:  Bed mobility  Body mechanics  Energy conservation  Functional activity tolerated  Gait training  Posture  Transfer training    Skilled Therapy Provided: Patient in bed upon arrival. RN approved activity. Educated patient on POC and benefits of mobilization. Agreeable to participate. Patient reporting no pain. Language line utilized throughout session for Welsh translation, however, patient able to follow simple commands in English. Patient benefits from increased cues and time in order to complete functional mobility tasks.    The patient's Approx Degree of Impairment: 46.58% has been calculated based on documentation in the [facility] '6 clicks' Inpatient Basic Mobility Short Form.  Research supports that patients with this level of impairment may benefit from HHPT.  Final disposition will be made by interdisciplinary medical team.    Patient End of Session: Up in chair;Needs met;Call light within reach;RN aware of session/findings;All patient questions and concerns addressed;Hospital anti-slip socks;Alarm set    CURRENT GOALS  Goals to be met by: 11/11/24  Patient Goal Patient's self-stated goal is: none stated   Goal #1 Patient is able to demonstrate supine - sit EOB @ level: supervision     Goal #1   Current Status    Goal #2 Patient is able to demonstrate transfers Sit to/from Stand at assistance level: supervision with walker - rolling      Goal #2  Current Status    Goal #3 Patient is able to ambulate 300 feet with assist device: walker - rolling at assistance level: supervision   Goal #3   Current Status    Goal #4 Patient will negotiate 12 stairs/one curb w/ assistive device and supervision   Goal #4   Current Status    Goal #5 Patient to demonstrate independence with home activity/exercise instructions provided to patient in preparation for discharge.   Goal #5   Current Status      Patient Evaluation Complexity Level:  History Moderate - 1 or 2 personal factors and/or co-morbidities   Examination of body systems Moderate - addressing a total of 3 or more elements   Clinical Presentation Low- Stable   Clinical Decision Making  Low Complexity     Gait Training: 10 minutes

## 2024-11-04 NOTE — PLAN OF CARE
Problem: Risk for Violence/Aggression  Goal: Absence of Violence/Aggression  Description: INTERVENTIONS:   - Identify precipitating factors for behavior   - Notify Charge RN/Provider   - Consider decreasing stimulation   - Consider distraction measures   - Consider discussion with provider regarding prn meds    - Consider SANDRINE (Moderate Risk only)   - Consider Code Support (High Risk only)   - Consider room safety checks   - Consider restraints  Outcome: Progressing     Problem: Patient Centered Care  Goal: Patient preferences are identified and integrated in the patient's plan of care  Description: Interventions:  - What would you like us to know as we care for you?  Patient from home with family.   - Provide timely, complete, and accurate information to patient/family  - Incorporate patient and family knowledge, values, beliefs, and cultural backgrounds into the planning and delivery of care  - Encourage patient/family to participate in care and decision-making at the level they choose  - Honor patient and family perspectives and choices  Outcome: Progressing   Patient alert and calm during the shift, able to follow commends. Patient denies complaints. CBI was discontinued this afternoon , suprapubic cath was connected to urinary bag. Urine has been clear, yellow. Patient tolerating diet well, had a BM. Fall precautions in place.

## 2024-11-04 NOTE — OCCUPATIONAL THERAPY NOTE
OCCUPATIONAL THERAPY EVALUATION - INPATIENT     Room Number: 471/471-A  Evaluation Date: 11/4/2024  Type of Evaluation: Initial       Physician Order: IP Consult to Occupational Therapy  Reason for Therapy: ADL/IADL Dysfunction and Discharge Planning    OCCUPATIONAL THERAPY ASSESSMENT   RN cleared pt for participation in OT session, which was completed in collaboration with PT. Upon arrival, pt was supine in bed and agreeable to activity. No visitors present during session. Pt was left in chair and alarm was activated. Call light and all needs left in reach. Handoff given to RN. Phone  used.    Patient is a 79 year old male admitted 11/1/2024 for abdominal pain, urinary retention, s/p Cystoscopy, bladder clot evacuation, prostate fulguration, urethral dilation, suprapubic tube placement.  Prior to admission, pt was assist from family PRN; uses RW.  Patient is currently requiring up to min A for ADLs overall along with min  for supine to sit, CGA for sitting at EOB, CGA for STS, and CGA for functional transfer at RW level. Pt tolerated about 1 minutes of supported standing.  Pt has the following impairments: weakness.         Education provided  Educated pt about role of OT and hospital therapy process as well as proper safety techniques including proper hand placement, body mechanics, safety techniques. Pt verbalized/demonstrated fair carryover.    Patient will benefit from continued skilled OT Services at discharge to promote prior level of function and safety with additional support and return home with home health OT    PLAN  OT Treatment Plan: Balance activities;Energy conservation/work simplification techniques;Continued evaluation;Compensatory technique education;Fine motor coordination activities;Neuromuscluar reeducation;Equipment eval/education;Patient/Family training;Patient/Family education;Cognitive reorientation;Endurance training;UE strengthening/ROM;Functional transfer training;Visual  perceptual training;IADL training;ADL training      OCCUPATIONAL THERAPY MEDICAL/SOCIAL HISTORY     Problem List  Principal Problem:    Urinary retention  Active Problems:    Difficult Alejo catheter placement (HCC)    Dementia with agitation, unspecified dementia severity, unspecified dementia type (HCC)    Urine retention      Past Medical History  Past Medical History:    BPH (benign prostatic hyperplasia)    Calculus of kidney    Colon, diverticulosis    Dementia (HCC)    Diabetes (HCC)    Diabetes mellitus type 2, controlled (HCC)    High blood pressure    Incontinence    Incontinence of feces    Prediabetes    borderline    Recurrent UTI    Tongue cancer (HCC)    Vitamin B12 deficiency       Past Surgical History  Past Surgical History:   Procedure Laterality Date    Other surgical history      tongue cancer surgery, [place], no radiation/ chemo    Tongue and mouth surg unlisted      To remove Tongue CA '11       HOME SITUATION  Type of Home: House  Home Layout: Two level  Lives With: Family                      Drives: No  Patient Regularly Uses: Rolling walker    SUBJECTIVE  \"OK\"    OCCUPATIONAL THERAPY EXAMINATION      OBJECTIVE  Precautions:  needed  Fall Risk: Standard fall risk    PAIN ASSESSMENT  Ratin           RANGE OF MOTION   Upper extremity ROM is within functional limits     STRENGTH ASSESSMENT  Upper extremity strength is within functional limits     COORDINATION  Gross Motor: WFL   Fine Motor: WFL     ACTIVITIES OF DAILY LIVING ASSESSMENT  -PAC ‘6-Clicks’ Inpatient Daily Activity Short Form  How much help from another person does the patient currently need…  -   Putting on and taking off regular lower body clothing?: A Little  -   Bathing (including washing, rinsing, drying)?: A Little  -   Toileting, which includes using toilet, bedpan or urinal? : A Little  -   Putting on and taking off regular upper body clothing?: A Little  -   Taking care of personal grooming such as  brushing teeth?: A Little  -   Eating meals?: None    AM-PAC Score:  Score: 19  Approx Degree of Impairment: 42.8%  Standardized Score (AM-PAC Scale): 40.22  CMS Modifier (G-Code): CK      FUNCTIONAL TRANSFER ASSESSMENT     Supine to sit: min A     Sit to stand: CGA  Toilet Transfer: CGA  Chair Transfer: CGA    FUNCTIONAL ADL ASSESSMENT  Overall min A    The patient's Approx Degree of Impairment: 42.8% has been calculated based on documentation in the [facility] '6 clicks' Inpatient Daily Activity Short Form.  Research supports that patients with this level of impairment may benefit from home health. Final disposition will be made by interdisciplinary medical team.    OT Goals  Patients self stated goal is: to go home     Patient will complete functional transfer with mod I  Comment:     Patient will complete toileting with mod I  Comment:     Patient will tolerate standing for 3 minutes in prep for adls with mod I   Comment:    Patient will complete item retrieval with mod I  Comment:          Goals  on:   Frequency: 3-5x/wk    Patient Evaluation Complexity Level:   Occupational Profile/Medical History MODERATE - Expanded review of history including review of medical or therapy record   Specific performance deficits impacting engagement in ADL/IADL MODERATE  3 - 5 performance deficits   Client Assessment/Performance Deficits MODERATE - Comorbidities and min to mod modifications of tasks    Clinical Decision Making MODERATE - Analysis of occupational profile, detailed assessments, several treatment options    Overall Complexity MODERATE     OT Session Time: 20 minutes  Self-Care Home Management: 10 minutes         Anya Genao OT  [facility]  Inpatient Rehabilitation  Occupational Therapy  [phone]

## 2024-11-04 NOTE — PAYOR COMM NOTE
--------------  ADMISSION REVIEW     Payor: [facility]  Subscriber #:  [id number]  Authorization Number: [id number]    Admit date: 11/1/24  Admit time:  9:37 PM       REVIEW DOCUMENTATION:     ED Provider Notes        ED Provider Notes signed by Ej Dunn MD at 11/1/2024  3:55 PM      Patient Seen in: [facility] Emergency Department    History     Chief Complaint   Patient presents with    Abdominal Pain     Stated Complaint: abdominal pain     HPI    79-year-old male with past medical history dementia diabetes hypertension, recurrence urinary tension/UTI with prior indwelling Alejo catheter presenting by EMS for evaluation of suprapubic abdominal pain as noted this morning.  History limited by clinical condition, obtained from family by phone.    Past Medical History:    BPH (benign prostatic hyperplasia)    Calculus of kidney    Colon, diverticulosis    Dementia (HCC)    Diabetes (HCC)    Diabetes mellitus type 2, controlled (HCC)    High blood pressure    Incontinence    Incontinence of feces    Prediabetes    borderline    Recurrent UTI    Tongue cancer (HCC)    Vitamin B12 deficiency   Review of Systems :  Constitutional: As per HPI  Gastrointestinal: Negative for vomiting; (+) abdominal pain.   Genitourinary: Negative for dysuria and hematuria.     Positive for stated complaint: abdominal pain  Other systems are as noted in HPI.  Constitutional and vital signs reviewed.      All other systems reviewed and negative except as noted above.    PSFH elements reviewed from today and agreed except as otherwise stated in HPI.    Physical Exam     ED Triage Vitals [11/01/24 1107]   BP (!) 118/106   Pulse 111   Resp 20   Temp 98.1 °F (36.7 °C)   Temp src Temporal   SpO2 99 %   O2 Device None (Room air)       Current:BP (!) 118/106   Pulse 111   Temp 98.1 °F (36.7 °C) (Temporal)   Resp 20   SpO2 99%         Physical Exam   Constitutional: Chronically ill-appearing.  HEENT:  MMM.  Head: Normocephalic.   Eyes: No injection.   Cardiovascular: RRR.   Pulmonary/Chest: Effort normal. CTAB.  Abdominal: Soft. Suprapubic fullness/tenderness; no CVA/flank tenderness..  : No obvious hematuria/dysuria; hypospadias without infectious change noted; bladder scan with >600mL.  Musculoskeletal: No gross deformity.  Neurological: Alert.   Skin: Skin is warm.   Psychiatric: Cooperative.  Nursing note and vitals reviewed.        ED Course     Labs Reviewed   URINALYSIS, ROUTINE   CBC WITH DIFFERENTIAL WITH PLATELET   BASIC METABOLIC PANEL (8)   TYPE AND SCREEN   URINE CULTURE, ROUTINE       ED Course as of 11/01/24 1554  ------------------------------------------------------------  Time: 11/01 1138  Comment: Bladder scan with >600mL noted - wll place Alejo.  ------------------------------------------------------------  Time: 11/01 1207  Comment: Patient somewhat agitated similar to prior with Alejo unable to be placed - will initiate calming medication to facilitate Alejo placement.  ------------------------------------------------------------  Time: 11/01 1249  Comment: Unable to place Alejo despite calming medication - will reattempt with additional meds/Coude.  ------------------------------------------------------------  Time: 11/01 1334  Comment: Unable to place/pass Coude catheter- will d/w urology.  ------------------------------------------------------------  Time: 11/01 1410  Comment: Case d/w urology Dr. Calista Ruvalcaba to graciously evaluate in ED.  ------------------------------------------------------------  Time: 11/01 1520  Comment: Urology graciously evaluating,  unable to pass Alejo.  ------------------------------------------------------------  Time: 11/01 1531  Comment: ED course with urology unable to place Alejo in ED, plan for evaluation/management in OR; hospitalist Dr. Seo notified/updated of ED course should admission be required.       MDM   DIFFERENTIAL DIAGNOSIS: After  history and physical exam differential diagnosis includes but is not limited to urinary retention.    Pulse ox: 99%:Normal on RA, as independently interpreted by myself    Medical Decision Making  Evaluation for urinary retention in setting of prior retention with known hypospadias - unable to place Alejo in ED despite calming meds/Coude use, urology graciously evaluating in ED and unable to pass catheter with plan for OR evaluation/management; case d/w hospitalist Dr. Seo for possible admission pending OR course.    Problems Addressed:  Dementia with agitation, unspecified dementia severity, unspecified dementia type (HCC): chronic illness or injury  Difficult Alejo catheter placement (HCC): acute illness or injury  Urinary retention: acute illness or injury    Amount and/or Complexity of Data Reviewed  Independent Historian: caregiver and EMS     Details: Collateral history obtained from EMS/family  External Data Reviewed: labs, radiology and notes.     Details: 12/4/2023 ED note/CT/labs reviewed  Labs: ordered.  Discussion of management or test interpretation with external provider(s): Case d/w urology APN Kiara/Jaquan in consultation    Risk  Elective major surgery with identified risk factors.      I was wearing at minimum a facemask and eye protection throughout this encounter with handwashing performed prior and after patient evaluation without personal hand/facial/oropharyngeal contact and gloves worn throughout encounter. See note and/or contact this provider for further PPE details.    Disposition and Plan     Clinical Impression:  1. Urinary retention    2. Difficult Alejo catheter placement (HCC)    3. Dementia with agitation, unspecified dementia severity, unspecified dementia type (HCC)        Disposition:  Send to or/cath/gi    Signed by Ej Dunn MD on 11/1/2024  3:55 PM           CONSULT  History and Physical    H&P signed by Leena Seo MD at 11/3/2024  9:26 AM       [facility]     PATIENT'S NAME: EVARISTO AGUIRRE   ATTENDING PHYSICIAN: Hank Grigsby MD   PATIENT ACCOUNT#:   [id number]    LOCATION:  [facility] PACU 3 [facility] 10  MEDICAL RECORD #:   [id number]       YOB: 1945  ADMISSION DATE:       11/01/2024     HISTORY AND PHYSICAL EXAMINATION     CHIEF COMPLAINT:  Urinary retention.     HISTORY OF PRESENT ILLNESS:  The patient is a 79-year-old [place]  male with chronic enlarged prostate and urine outlet obstruction.  His Alejo catheter was discontinued because he was able to void on his own.  Today, he was sent to the emergency department for inability to void for unknown amount of time.  Multiple attempts to insert the Alejo catheter were nonsuccessful because of meatal deformity and hypospadias.  Patient will be admitted to the hospital, taken to the operating room by Urology for further catheter versus suprapubic catheter insertion.     ASSESSMENT:    1.       Urinary retention.  2.       Essential hypertension.  3.       Dementia.     PLAN:  Patient will be taken to the OR by Urology for cystoscopy and possible suprapubic catheter placement.  Will continue to monitor his hemodynamic status postoperatively.  Use IV hydralazine p.r.n. for blood pressure control.  Resume his home medications postoperatively.  IV Rocephin.  Urine culture once obtained.  Further recommendations to follow.      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 The patient was counseled on options, risks, and benefits and elected to undergo the above procedure. We discussed risks including, but not limited to, bleeding, infection, damage to surrounding structures, need for repeat procedure(s). The patient understood these risks and wished to proceed with surgery.     Findings:  Urethral erosion to base of penis. ~10Fr bulbar urethral stricture dilated up to 28 Fr. Severely enlarged prostate with trilobar hyperplasia, significant oozing from prostate - multiple large bladder clots evacuated and prostate cauterized extensively. Normal bladder on cystoscopy. Successful placement of 18 Fr suprapubic tube in anterior bladder.     Procedure:  The patient was taken to the operating room and a timeout was performed confirming the correct patient and procedure. The patient was prepped and draped in lithotomy position after undergoing general anesthesia. Pre-operative prophylactic antibiotics were given in the form of Ceftriaxone.     A cystoscope was inserted per urethra. 10 Fr bulbar urethral stricture was seen.  I inserted a sensor wire through this and into the bladder.  I removed the scope.  I sequentially dilated the urethral stricture from 16 Georgian up to 28 Georgian using Cerna sounds.  I reinserted the scope and was able to easily bypassed the stricture into the bladder.  The prostate was significantly enlarged with many oozing blood vessels and there were several large clots in the bladder.  I removed the cystoscope and reinserted a bipolar resectoscope.  I evacuated the clots from the bladder.  I used a medium bipolar loop to cauterize the prostate extensively till hemostasis was obtained.  I cauterized a good majority of the median lobe and some of the lateral lobes.     The bladder was surveyed and appeared normal.  I distended the bladder with fluid and visualize the anterior  bladder dome.     In a location 2 fingerbreadths above the pubic bone in the midline I inserted a spinal needle at this location and the tip of the needle was visualized cystoscopically at the anterior dome of the bladder in a good position.      I then removed the needle, and using a #15 scalpel made a 1 cm incision in this location.  I inserted the suprapubic tube introducer under direct cystoscopic visualization.  Once the introducer was in appropriate position and the bladder remove the inner cannula and quickly inserted a 18 Mauritanian Alejo catheter.  Yellow urine immediately drained.  I inflated the balloon with 10 mL sterile water.     Using two 3-0 nylon sutures I secured the catheter to the skin.  I removed the cystoscope.  I inserted a 22 Mauritanian three-way Alejo catheter and inflated the balloon with 50 mL sterile water.  I capped the suprapubic tube and attached the three-way urethral Alejo catheter to CBI.  I secured the suprapubic tube under a dressing.     The patient was awoken from anesthesia and transferred to PACU in stable condition. The patient tolerated the procedure well. All instrument/supply counts were correct at the end of the case.     Specimens:   None     Estimated Blood Loss:  1 mL     Tubes/Drains:  18 Mauritanian suprapubic Alejo catheter  22 Mauritanian three-way urethral Alejo catheter to CBI     Complications:   None immediate     Condition from OR:  Stable     Plan:   Run CBI per urethral Alejo catheter.  Will consider clamp trial once urine clears eventually remove urethral Alejo catheter prior to discharge.  At this time will attach suprapubic tube to drainage.  Return to urology clinic for first suprapubic tube change and drain stitch removal in 1 month.        Hank Grigsby MD  Staff Urologist  [facility]          CONSULT  Impression:   Jose Eduardo Padilla is a 79 year old male with history of dementia, nephrolithiasis, diabetes, hypertension, incontinence of stool and urine,  urinary retention and BPH.  He has been seen by Dr. Hernandez and Cindy in the past.  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  Presents today with urinary retention after patient pulled out Alejo and bladder scan greater than 600 mL.  ED staff unable to replace Alejo catheter after 3 attempts in the ED so urology was consulted.     Unable to place 12 or 18 Fr coude at bedside due to significant resistance a few cm beyond the iatrogenic hypospadiac meatus. Exam shows urethral erosion down to base of the penis. Patient required restraints and sedation for Alejo attempt.      Reviewed prior CT from 2023 - good window to bladder above pubic bone.     Spoke with daughter Renay on the phone about options for cystoscopy, urethral dilation, urethral vs. SP Alejo placement. She elects for SPT placement.     Recommendations:   - OR for cystoscopy, possible urethral dilation and Alejo placement, suprapubic tube placement        I have personally reviewed all relevant medical records, labs, and imaging.     Thank you for this consult. Please call if there are any questions or concerns.        Medical Decision Making  Urinary retention: Undiagnosed new problem  Urethral stricture: Undiagnosed new problem  Urethral erosion: Chronic problem     Amount and/or Complexity of Data Reviewed  External Data Reviewed: labs and notes.     Risk  Minor surgery with identified risk factors.           Hank Grigsby MD  Staff Urologist  [facility]    11/2           Date of Service: 11/2/2024  1:47 PM     Signed         [facility]  Progress Note       Assessment and Plan:     Acute urinary retention  Acute E coli UTI  -urology on consult  -urine clear  -cont CBI per urology  -finasteride  -cont IV abx     Dementia  -monitor mental status. stable     DVT Mechanical Prophylaxis:   SCDs,    DVT Pharmacologic Prophylaxis   Medication   None               Subjective:        Initial Chief Complaint:  urinary retention     Patient with dementia. Denies pain or discomfort     Objective:       Temp:  [96.9 °F (36.1 °C)-98.4 °F (36.9 °C)] 98.3 °F (36.8 °C)  Pulse:  [] 78  Resp:  [10-22] 18  BP: (102-189)/() 102/52  SpO2:  [95 %-100 %] 98 %  General:  Alert, no distress  HEENT:  Normocephalic, atraumatic  Cardiac:  Regular rate, regular rhythm  Pulmonary:  Clear to auscultation bilaterally, respirations unlabored  Gastrointestinal:  Soft, non-tender, normal bowel sounds  Musculoskeletal:  No joint swelling  Extremities:  No edema, no cyanosis, no clubbing  Neurologic:  nonfocal  Psychiatric:  Normal affect, calm and appropriate     Intake/Output Summary (Last 24 hours) at 11/2/2024 1347  Last data filed at 11/2/2024 1231      Gross per 24 hour   Intake 1680 ml   Output 4950 ml   Net -3270 ml                 Recent Labs   Lab 11/01/24  1608 11/02/24  0555   WBC 13.7* 13.0*   HGB 13.8 12.5*   HCT 40.0 37.7*   .0 241.0   RBC 4.68 4.43   MCV 85.5 85.1   MCH 29.5 28.2   MCHC 34.5 33.2   RDW 14.2 13.8   NEPRELIM 12.56* 10.19*           Recent Labs   Lab 11/01/24  2215 11/02/24  0555   BUN 16 16   CREATSERUM 0.85 0.81   CA 9.6 9.4    143   K 4.8 4.0    110   CO2 23.0 26.0   * 117*         No results found.      Medications:   finasteride  5 mg Oral Daily    cefTRIAXone  2 g Intravenous Q24H      PRN Meds:   acetaminophen    ondansetron    OLANZapine (Zyprexa) 5 mg in sterile water for injection (PF) IM injection    hydrALAzine     Supplementary Documentation:   DVT Mechanical Prophylaxis:   SCDs,    DVT Pharmacologic Prophylaxis   Medication   None                Musa Epstein MD                    11/3          Date of Service: 11/3/2024 11:27 AM     Signed         [facility]  Progress Note  Assessment and Plan:     Acute urinary retention  Acute E coli UTI  BPH  -urology on consult  -s/p cystoscopy, urethral dilation, bladder clot evacuation and  fulguration of prostate, SP tube placement on 11/1  -cont CBI held this morning  -urine clear  -finasteride started  -cont IV abx-> oral on discharge     Dementia  -monitor mental status. Stable     Dispo:  possible discharge if ok with urology     DVT Mechanical Prophylaxis:   SCDs,    DVT Pharmacologic Prophylaxis   Medication   None               Subjective:       Initial Chief Complaint:  urinary retention     Patient with dementia. Denies pain or discomfort     Objective:       Temp:  [98.3 °F (36.8 °C)-98.8 °F (37.1 °C)] 98.8 °F (37.1 °C)  Pulse:  [73-78] 78  Resp:  [18] 18  BP: (102-138)/(52-76) 138/76  SpO2:  [97 %-99 %] 97 %  General:  Alert, no distress  HEENT:  Normocephalic, atraumatic  Cardiac:  Regular rate, regular rhythm  Pulmonary:  Clear to auscultation bilaterally, respirations unlabored  Gastrointestinal:  Soft, non-tender, normal bowel sounds  Musculoskeletal:  No joint swelling  Extremities:  No edema, no cyanosis, no clubbing  Neurologic:  nonfocal  Psychiatric:  Normal affect, calm and appropriate     Intake/Output Summary (Last 24 hours) at 11/3/2024 1127  Last data filed at 11/3/2024 0701      Gross per 24 hour   Intake 500 ml   Output 2700 ml   Net -2200 ml                  Recent Labs   Lab 11/01/24  1608 11/02/24  0555 11/03/24  0625   WBC 13.7* 13.0* 9.9   HGB 13.8 12.5* 11.8*   HCT 40.0 37.7* 36.0*   .0 241.0 226.0   RBC 4.68 4.43 4.24   MCV 85.5 85.1 84.9   MCH 29.5 28.2 27.8   MCHC 34.5 33.2 32.8   RDW 14.2 13.8 14.0   NEPRELIM 12.56* 10.19* 6.49            Recent Labs   Lab 11/01/24  2215 11/02/24  0555 11/03/24  0625   BUN 16 16 19   CREATSERUM 0.85 0.81 0.79   CA 9.6 9.4 9.1    143 142   K 4.8 4.0 3.5    110 108   CO2 23.0 26.0 26.0   * 117* 101*         No results found.      Medications:   finasteride  5 mg Oral Daily    donepezil  10 mg Oral Nightly    melatonin  5 mg Oral Nightly    cefTRIAXone  2 g Intravenous Q24H      PRN Meds:   acetaminophen     ondansetron    OLANZapine (Zyprexa) 5 mg in sterile water for injection (PF) IM injection    hydrALAzine         MDM High. Time spent on chart/note review, review of labs/imaging, discussion with patient, physical exam, discussion with staff, consultants, coordinating care, writing progress note, discussion of plan of care.        Musa Epstein MD                      MEDICATIONS ADMINISTERED IN LAST 1 DAY:  cefTRIAXone (Rocephin) 2 g in sodium chloride 0.9% 100 mL [id number]       Date Action Dose Route User    11/3/2024 1637 New Bag 2 g Intravenous Kerrie Aviles RN          donepezil (Aricept) tab 10 mg       Date Action Dose Route User    11/3/2024 2111 Given 10 mg Oral Camille Garcia RN          finasteride (Proscar) tab 5 mg       Date Action Dose Route User    11/4/2024 0817 Given 5 mg Oral Kerrie Aviles RN          melatonin cap/tab 5 mg       Date Action Dose Route User    11/3/2024 2111 Given 5 mg Oral Camille Garcia RN            Vitals (last day)       Date/Time Temp Pulse Resp BP SpO2 Weight O2 Device O2 Flow Rate (L/min) Who    11/04/24 1217 -- 75 18 105/64 99 % -- None (Room air) -- JT    11/04/24 0312 98.1 °F (36.7 °C) 68 16 127/62 97 % -- None (Room air) -- KJ    11/03/24 1920 98.2 °F (36.8 °C) 75 16 138/67 98 % -- None (Room air) -- KJ    11/03/24 1134 97.7 °F (36.5 °C) 75 18 112/68 99 % -- None (Room air) -- MJ    11/03/24 0444 98.8 °F (37.1 °C) 78 18 138/76 97 % -- None (Room air) -- SR

## 2024-11-04 NOTE — CM/SW NOTE
Anticipated therapy need: Home with Home Healthcare    20 Ref sent, no accepting agency at this time.    MDO for dc.    CM discussed with [facility] liaison. They have a hx w/ pt however, due to staffing constraints may not be able to accept back for services.    CM notified MD/RN of above. If [facility] is unable then pt will dc without HHC and needs will need to be addressed by family or in MD office.    1315  [facility] is able to accept. Res'd in aidin    Plan  Home w/ [facility]      / to remain available for support and/or discharge planning.     Kristine Person, RN    Ext [phone]

## 2024-11-04 NOTE — TELEPHONE ENCOUNTER
Dary from [facility] calling to state patient is at [facility], will discharge today, will be going home with Alejo, received orders for nurse and physical therapy services, wanted to make doctor aware and ask if doctor will sign and follow for home health. Voicemail is secure.

## 2024-11-04 NOTE — PROGRESS NOTES
Urology Progress Note  [facility]  part of [facility]      Jose Eduardo Padilla Patient Status:  Observation    1945 MRN [id number]   Location [facility] 4W/SW/SE Attending Musa Epstein MD   Hosp Day # 3 PCP Vito Plata MD     History of Present Illness:   Patient is currently sitting up in bed eating breakfast.. Afebrile, VSS. Clear yellow urine draining via SPT. No c/o chest pain or difficulty breathing.     Cr 0.79  WBC 9.9    UCx shows 50,000-99,000 cfu/mL E.Coli    Physical Exam:   Vital Signs:  Blood pressure 127/62, pulse 68, temperature 98.1 °F (36.7 °C), temperature source Oral, resp. rate 16, height 5' 2\" (1.575 m), weight 145 lb (65.8 kg), SpO2 97%.     CONSTITUTIONAL: Well developed, well nourished, in no acute distress  NEUROLOGIC: Alert   HEAD: Normocephalic, atraumatic  EYES: Sclera non-icteric  ENT: Hearing intact, moist mucous membranes  NECK: No obvious goiter or masses  RESPIRATORY: Normal respiratory effort  SKIN: No evident rashes  GENITOURINARY: SPT draining clear yellow urine, erosion noted to penis    Laboratory Data:  Lab Results   Component Value Date    WBC 9.9 2024    HGB 11.8 (L) 2024    .0 2024     Lab Results   Component Value Date     2024    K 3.5 2024     2024    CO2 26.0 2024    BUN 19 2024     (H) 2024    GFRAA 102 2022    AST 11 2024    ALT 7 (L) 2024    TP 8.2 2024    ALB 4.0 2024    PHOS 3.0 2022    CA 9.1 2024    MG 2.0 2023       Urine Culture Results (last three years):  Lab Results   Component Value Date    URINECUL 50,000-99,000 CFU/ML Escherichia coli (A) 2024    URINECUL >100,000 CFU/ML Escherichia coli (A) 2024    URINECUL >100,000 CFU/ML Escherichia coli (A) 2024    URINECUL No Growth at 18-24 hrs. 2024    URINECUL  2023     ,000 cfu/ml Multiple species present- probable  contamination.    URINECUL >100,000 CFU/ML Escherichia coli (A) 12/04/2023    URINECUL >100,000 CFU/ML Escherichia coli (A) 11/07/2023    URINECUL  09/27/2023     ,000 cfu/ml Multiple species present- probable contamination.    URINECUL No Growth at 18-24 hrs. 06/09/2023    URINECUL >100,000 CFU/ML Stenotrophomonas maltophilia (A) 05/15/2023    URINECUL >100,000 CFU/ML Enterococcus faecalis NOT VRE (A) 05/15/2023    URINECUL >100,000 CFU/ML Enterococcus faecalis VRE (A) 05/03/2023    URINECUL (A) 05/03/2023     10,000 - 50,000 CFU/ML Stenotrophomonas maltophilia    URINECUL >100,000 CFU/ML Proteus mirabilis (A) 01/27/2023    URINECUL 50,000-99,000 CFU/ML Proteus mirabilis (A) 08/25/2022       PSA:  Lab Results   Component Value Date    PSAS 6.04 (H) 09/30/2021        Imaging (last three days):  No results found.     Assessment:   Jose Eduardo Padilla is a 79 year old male with history of dementia, nephrolithiasis, diabetes, hypertension, incontinence of stool and urine, urinary retention and BPH.  He has been seen by Dr. Hernandez and Cindy in the past.  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  Presents today with urinary retention after patient pulled out Alejo and bladder scan greater than 600 mL.  ED staff unable to replace Alejo catheter after 3 attempts in the ED so urology was consulted.     Unable to place 12 or 18 Fr coude at bedside due to significant resistance a few cm beyond the iatrogenic hypospadiac meatus. Exam shows urethral erosion down to base of the penis. Patient required restraints and sedation for Alejo attempt.      OR 11/1/24 for cystoscopy, urethral dilation, bladder clot evacuation and fulguration of prostate, SP tube placement with Dr Grigsby.    Plan:   - Continue finasteride  - Maintain SPT   - Will need first SP tube change in urology clinic setup    Please call with any questions.    MARTY Pratt  [facility]  Urology  11/04/20024

## 2024-11-04 NOTE — CM/SW NOTE
11/04/24 1400   Discharge disposition   Expected discharge disposition Home or Self   Discharge transportation [facility]     RN sts family is requesting transport home.  Per chart review pt has dementia w/ behavior disturbance hx.     [facility] for 1430, pcs done.    RN notified.

## 2024-11-04 NOTE — PLAN OF CARE
Problem: Risk for Violence/Aggression  Goal: Absence of Violence/Aggression  Description: INTERVENTIONS:   - Identify precipitating factors for behavior   - Notify Charge RN/Provider   - Consider decreasing stimulation   - Consider distraction measures   - Consider discussion with provider regarding prn meds    - Consider SANDRINE (Moderate Risk only)   - Consider Code Support (High Risk only)   - Consider room safety checks   - Consider restraints  Outcome: Progressing     Problem: Patient Centered Care  Goal: Patient preferences are identified and integrated in the patient's plan of care  Description: Interventions:  - What would you like us to know as we care for you?   - Provide timely, complete, and accurate information to patient/family  - Incorporate patient and family knowledge, values, beliefs, and cultural backgrounds into the planning and delivery of care  - Encourage patient/family to participate in care and decision-making at the level they choose  - Honor patient and family perspectives and choices  Outcome: Progressing     Problem: Patient/Family Goals  Goal: Patient/Family Long Term Goal  Description: Patient's Long Term Goal: discharge    Interventions:  - achieve baseline urinary function  - urology consult  - strict I&Os  - See additional Care Plan goals for specific interventions  Outcome: Progressing  Goal: Patient/Family Short Term Goal  Description: Patient's Short Term Goal: complete continuous bladder irrigation    Interventions:   - monitor three way catheter system  - chart and monitor fluid intake and output  - keep system clean  - See additional Care Plan goals for specific interventions  Outcome: Progressing

## 2024-11-05 NOTE — TELEPHONE ENCOUNTER
Spoke to Nurse Hernandes relayed Dr message    PSR please assist with TCM appt per Dr-slot available next Tuesday

## 2024-11-05 NOTE — PAYOR COMM NOTE
--------------  DISCHARGE REVIEW    Payor: [facility]  Subscriber #:  [id number]  Authorization Number: [id number]    Admit date: 24  Admit time:   9:37 PM  Discharge Date: 2024  3:31 PM     Admitting Physician: Leena Seo MD  Attending Physician:  No att. providers found  Primary Care Physician: Vito Plata MD          Discharge Summary Notes        Discharge Summary signed by Musa Epstein MD at 2024 11:57 AM       Author: Musa Epstein MD Specialty: HOSPITALIST, HOSPITALIST Author Type: Physician    Filed: 2024 11:57 AM Date of Service: 2024 11:57 AM Status: Addendum    : Musa Epstein MD (Physician)    Related Notes: Original Note by Musa Epstein MD (Physician) filed at 2024 11:56 AM         [facility]  Discharge Summary     Jose Eduardo Padilla  : 1945    Status: Inpatient  Day #: 3    Attending: Musa Epstein MD  PCP: Vito Plata MD     Date of Admission:  2024  Date of Discharge:  2024     Hospital Discharge Diagnoses:     Acute urinary retention  Acute E coli UTI  BPH  Dementia      History of Present Illness:     Copied from Admission H&P:    The patient is a 79-year-old [place] male with chronic enlarged prostate and urine outlet obstruction. His Alejo catheter was discontinued because he was able to void on his own. Today, he was sent to the emergency department for inability to void for unknown amount of time. Multiple attempts to insert the Alejo catheter were nonsuccessful because of meatal deformity and hypospadias. Patient will be admitted to the hospital, taken to the operating room by Urology for further catheter versus suprapubic catheter insertion.       Hospital Course:     Acute urinary retention  Acute E coli UTI  BPH  -urology on consult  -s/p cystoscopy, urethral dilation, bladder clot evacuation and fulguration of prostate, SP tube placement on   -s/p CBI  -urine clear  -finasteride  started  -cont IV abx-> oral on discharge  -urethral eddy removed, has suprapubic catheter  -urine clear  -f/u urology     Dementia  -monitor mental status. Stable     Consultants         Provider   Role Specialty     Mariah Cronin PA-C      Consulting Physician Physician Assistant          Surgical Procedures       Case IDs Date Procedure Surgeon Location Status    [id number] 11/1/24 Cystoscopy, bladder clot evacuation, prostate fulguration, urethral dilation, suprapubic tube placement Hank Grigsby MD [facility] MAIN OR [facility]           Physical Exam:   Blood pressure 127/62, pulse 68, temperature 98.1 °F (36.7 °C), temperature source Oral, resp. rate 16, height 5' 2\" (1.575 m), weight 145 lb (65.8 kg), SpO2 97%.  General:  Alert, no distress  HEENT:  Normocephalic, atraumatic  Cardiac:  Regular rate, regular rhythm  Pulmonary:  Clear to auscultation bilaterally, respirations unlabored  Gastrointestinal:  Soft, non-tender, normal bowel sounds  Musculoskeletal:  No joint swelling  Extremities:  No edema, no cyanosis, no clubbing  Neurologic:  nonfocal  Psychiatric:  Normal affect, calm and appropriate         Discharge Medications        START taking these medications        Instructions Prescription details   cephALEXin 500 MG Caps  Commonly known as: Keflex      Take 1 capsule (500 mg total) by mouth 3 (three) times daily for 5 days.   Stop taking on: November 8, 2024  Quantity: 15 capsule  Refills: 0     finasteride 5 MG Tabs  Commonly known as: Proscar      Take 1 tablet (5 mg total) by mouth daily.   Quantity: 90 tablet  Refills: 6            CONTINUE taking these medications        Instructions Prescription details   acetaminophen 500 MG Tabs  Commonly known as: Tylenol Extra Strength      Take 1 tablet (500 mg total) by mouth every 6 (six) hours as needed for Pain or Fever.   Quantity: 100 tablet  Refills: 1     Diapers & Supplies Misc      Adult diapers   Quantity: 100 Device  Refills: 3     donepezil 10 MG  Tabs  Commonly known as: Aricept      Take 1 tablet (10 mg total) by mouth nightly.   Quantity: 90 tablet  Refills: 0     Melatonin 5 MG Tabs      Take 1 tablet (5 mg total) by mouth nightly. For sleep   Refills: 0            STOP taking these medications      Accu-Chek Francine Plus Strp        tamsulosin 0.4 MG Caps  Commonly known as: Flomax                  Where to Get Your Medications        These medications were sent to [id number] - [place], [place] - [address] AT [facility], [phone], [phone]  [address] [place] [id number]      Phone: [phone]   cephALEXin 500 MG Caps  finasteride 5 MG Tabs        Follow-up Information       Eliazar Hernandez MD Follow up.    Specialty: UROLOGY  Contact information:  [facility] [address]  [facility] [address] [phone]                             Hospital Discharge Diagnoses:  Acute urinary retention    Lace+ Score: 65  59-90 High Risk  29-58 Medium Risk  0-28   Low Risk.    TCM Follow-Up Recommendation:  LACE > 58: High Risk of readmission after discharge from the hospital.        I spent >30 minutes on this discharge. Discussed treatment and discharge plans.       Musa Epstein MD      Electronically signed by Musa Epstein MD on 11/4/2024 11:57 AM         REVIEWER COMMENTS

## 2024-11-05 NOTE — TELEPHONE ENCOUNTER
1st attempt - left message to call back Cell#, home# no voice mail yet up yet. See message below.

## 2024-11-07 ENCOUNTER — PATIENT OUTREACH (OUTPATIENT)
Dept: CASE MANAGEMENT | Age: 79
End: 2024-11-07

## 2024-11-07 NOTE — PROGRESS NOTES
TCM request (discharged 11/04)    Dr Vito Plata  [address] [address]  [phone]    Attempt #1:  Left message with patient's daughterRenay on voicemail to call transitions specialist back to schedule follow up appointments. Provided Transitions specialist scheduling phone number [phone].

## 2024-11-08 NOTE — PROGRESS NOTES
TCM request (discharged 11/04)     Dr Vito Plata  [address] [address]  [phone]     Attempt #2:  Left message with patient daughterRenay on voicemail to call transitions specialist back to schedule follow up appointments. Provided Transitions specialist scheduling phone number [phone].

## 2024-11-11 NOTE — PROGRESS NOTES
TCM request (discharged 11/04)     Dr Vito Plata  [facility]  [address] [place] [id number]  [phone]  Multiple attempts w/no calls back; no appointment made  Closing encounter    Attempt #3:  Left message with patient daughterRenay on voicemail to call transitions specialist back to schedule follow up appointments. Provided Transitions specialist scheduling phone number [phone]. Closing encounter. Will re-open if patient returns call.

## 2024-11-15 ENCOUNTER — MED REC SCAN ONLY (OUTPATIENT)
Dept: FAMILY MEDICINE CLINIC | Facility: CLINIC | Age: 79
End: 2024-11-15

## 2024-11-19 ENCOUNTER — TELEPHONE (OUTPATIENT)
Dept: FAMILY MEDICINE CLINIC | Facility: CLINIC | Age: 79
End: 2024-11-19

## 2024-11-19 DIAGNOSIS — F03.918 DEMENTIA WITH BEHAVIORAL DISTURBANCE (HCC): ICD-10-CM

## 2024-11-19 RX ORDER — DONEPEZIL HYDROCHLORIDE 10 MG/1
10 TABLET, FILM COATED ORAL NIGHTLY
Qty: 90 TABLET | Refills: 0 | Status: SHIPPED | OUTPATIENT
Start: 2024-11-19

## 2024-11-19 NOTE — TELEPHONE ENCOUNTER
He has had multiple ER visits and admissions/ last seen by me Jan 2024, there is poor follow up/ communication with family members and no shows.  He needs emergency room follow up for symptoms likely  Will refill medication

## 2024-11-19 NOTE — TELEPHONE ENCOUNTER
The patient already scheduled a 12/11/24 12:30 pm  emergency room visit follow up.   Is this ok or does he need to be seen sooner?   If sooner appointment is needed can we use [facility]?

## 2024-11-19 NOTE — TELEPHONE ENCOUNTER
Daughter calling for Patient.   Patient recently had surgery, noted  site is \"bleeding\" per daughter. Patient had Cystoscopy, bladder clot evacuation, prostate fulguration, urethral dilation, suprapubic tube placement   Daughter unable to provide additional information.  Daughter is not with Patient a this time. Patient has dementia.  Advised to contact surgeon's office.  If unable to stop the bleeding, Patient experiencing dizziness, to take him to the ER.    Daughter also requesting for refill on dementia medication and additional medication to help with sleep. Per daughter, Patient is unable to sleep and pulls on things.  Please advise. Daughter scheduled follow up on 12/11/24.    Pended donepezil medication.  Pharmacy verified.

## 2024-11-19 NOTE — TELEPHONE ENCOUNTER
Attempted to call patient's daughter Renay (on Release of Information), left message to call back.

## 2024-11-20 NOTE — TELEPHONE ENCOUNTER
Verified name and  of patient.    Daughter of patient informed of Dr. Plata's message.    Future Appointments   Date Time Provider Department Center   2024 12:30 PM Vito Plata MD [facility]   2025  2:30 PM Saeed Gaytan MD [facility] [place]       She was advised that the following medication was sent to pharmacy yesterday 24:   Disp Refills Start End    donepezil 10 MG Oral Tab 90 tablet 0 2024 --    Sig - Route: Take 1 tablet (10 mg total) by mouth nightly. - Oral    Sent to pharmacy as: Donepezil HCl 10 MG Oral Tablet (Aricept)    E-Prescribing Status: Receipt confirmed by pharmacy (2024 11:43 AM CST)      Associated Diagnoses    Dementia with behavioral disturbance (HCC)        Pharmacy    [id number] [place] [address] [address] AT [facility] [address], [phone], [phone]
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
## 2024-12-11 ENCOUNTER — OFFICE VISIT (OUTPATIENT)
Dept: FAMILY MEDICINE CLINIC | Facility: CLINIC | Age: 79
End: 2024-12-11

## 2024-12-11 VITALS
DIASTOLIC BLOOD PRESSURE: 77 MMHG | TEMPERATURE: 97 F | HEART RATE: 71 BPM | WEIGHT: 148 LBS | BODY MASS INDEX: 27.23 KG/M2 | HEIGHT: 62 IN | SYSTOLIC BLOOD PRESSURE: 136 MMHG

## 2024-12-11 DIAGNOSIS — D64.9 MILD CHRONIC ANEMIA: ICD-10-CM

## 2024-12-11 DIAGNOSIS — N13.8 BPH WITH OBSTRUCTION/LOWER URINARY TRACT SYMPTOMS: ICD-10-CM

## 2024-12-11 DIAGNOSIS — R33.9 CHRONIC RETENTION OF URINE: ICD-10-CM

## 2024-12-11 DIAGNOSIS — F03.918 DEMENTIA WITH BEHAVIORAL DISTURBANCE (HCC): Primary | ICD-10-CM

## 2024-12-11 DIAGNOSIS — Z93.59 SUPRAPUBIC CATHETER (HCC): ICD-10-CM

## 2024-12-11 DIAGNOSIS — E11.9 DIET-CONTROLLED DIABETES MELLITUS (HCC): ICD-10-CM

## 2024-12-11 DIAGNOSIS — N40.1 BPH WITH OBSTRUCTION/LOWER URINARY TRACT SYMPTOMS: ICD-10-CM

## 2024-12-11 DIAGNOSIS — Z78.9 NO ADVANCE DIRECTIVES: ICD-10-CM

## 2024-12-11 PROCEDURE — 99214 OFFICE O/P EST MOD 30 MIN: CPT | Performed by: FAMILY MEDICINE

## 2024-12-11 RX ORDER — DONEPEZIL HYDROCHLORIDE 10 MG/1
10 TABLET, FILM COATED ORAL NIGHTLY
Qty: 90 TABLET | Refills: 3 | Status: SHIPPED | OUTPATIENT
Start: 2024-12-11

## 2024-12-11 NOTE — PROGRESS NOTES
HPI:    Patient ID: Jose Eduardo Padilla is a 79 year old male.      HPI    Chief Complaint   Patient presents with    Hospital F/U    Post-Op       Wt Readings from Last 6 Encounters:   12/11/24 148 lb (67.1 kg)   11/01/24 145 lb (65.8 kg)   04/26/24 147 lb 11.3 oz (67 kg)   03/14/24 147 lb 11.3 oz (67 kg)   01/31/24 147 lb (66.7 kg)   01/03/24 148 lb (67.1 kg)     BP Readings from Last 3 Encounters:   12/11/24 136/77   11/04/24 105/64   05/17/24 107/85     Here with daughter  He lives with son  No advance directives    Patient has advanced dementia  Sometimes gets verbally aggressive  Calm today     Review of Systems   Constitutional:  Negative for activity change, appetite change, chills and fever.   HENT:  Postnasal drip: pcp.    Respiratory:  Negative for cough and shortness of breath.    Genitourinary:         Urine catherter in place   Stool incontinent, wear diapers        /77   Pulse 71   Temp 96.7 °F (35.9 °C) (Temporal)   Ht 5' 2\" (1.575 m)   Wt 148 lb (67.1 kg)   BMI 27.07 kg/m²          Current Outpatient Medications   Medication Sig Dispense Refill    donepezil 10 MG Oral Tab Take 1 tablet (10 mg total) by mouth nightly. 90 tablet 3    acetaminophen 500 MG Oral Tab Take 1 tablet (500 mg total) by mouth every 6 (six) hours as needed for Pain or Fever. 100 tablet 1    Diapers & Supplies Does not apply Misc Adult diapers 100 Device 3     Allergies:Allergies[1]   PHYSICAL EXAM:     Chief Complaint   Patient presents with    Hospital F/U    Post-Op      Physical Exam  Vitals reviewed.   Cardiovascular:      Rate and Rhythm: Normal rate and regular rhythm.      Pulses: Normal pulses.      Heart sounds: Normal heart sounds.   Pulmonary:      Effort: Pulmonary effort is normal.      Breath sounds: Normal breath sounds.   Abdominal:      General: There is no distension.      Palpations: There is no mass.      Tenderness: There is no abdominal tenderness. There is no right CVA tenderness, left CVA  tenderness, guarding or rebound.      Hernia: No hernia is present.      Comments: Catheter in place   Musculoskeletal:         General: No swelling.      Right lower leg: No edema.      Left lower leg: No edema.   Neurological:      Mental Status: He is alert.      Comments: Not oriented, advanced dementia , calm                 ASSESSMENT/PLAN:     Encounter Diagnoses   Name Primary?    Dementia with behavioral disturbance (HCC) Yes    Mild chronic anemia     Chronic retention of urine     BPH with obstruction/lower urinary tract symptoms     Suprapubic catheter (HCC)     Diet-controlled diabetes mellitus (HCC)     No advance directives        1. Dementia with behavioral disturbance (HCC)  Follow up neuropsychiatry   - donepezil 10 MG Oral Tab; Take 1 tablet (10 mg total) by mouth nightly.  Dispense: 90 tablet; Refill: 3    2. Mild chronic anemia      3. Chronic retention of urine      4. BPH with obstruction/lower urinary tract symptoms  Continue present management     5. Suprapubic catheter (HCC)      6. Diet-controlled diabetes mellitus (HCC)  Last A1C 5.7      Daughter encouraged to complete advance directives/ POA     No orders of the defined types were placed in this encounter.        The above note was creating using Dragon speech recognition technology. Please excuse any typos    Meds This Visit:  Requested Prescriptions     Signed Prescriptions Disp Refills    donepezil 10 MG Oral Tab 90 tablet 3     Sig: Take 1 tablet (10 mg total) by mouth nightly.       Imaging & Referrals:  [facility] PCP OR REGISTRY REMOVAL REQUEST       ID#[id number]         [1] No Known Allergies

## 2025-01-03 NOTE — TELEPHONE ENCOUNTER
Loretta, states that the patient was discharged about a month ago  from the hospital and she is requesting Home Health orders for the patient.
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
## 2025-01-13 NOTE — TELEPHONE ENCOUNTER
rigo from [facility] called.  Suprapubic catheter was placed in the hospital November 2024.   Has not been changed.  Please call

## 2025-01-13 NOTE — TELEPHONE ENCOUNTER
Per daughter needs to schedule catheter change, has not been changed since November. Please call thank you.

## 2025-01-14 NOTE — TELEPHONE ENCOUNTER
I s/w HH nurse Mechelle from [facility] and determined that pt has not had an SP cath change since the cath was inserted in  by Dr. Grigsby. HH nurse would like to knwo if either she can change it and if so she needs orders or if our office will change it. I explained that our CMA Kathy s/w pt's Dtr and she does not want pt to see [facility] and asking for [facility] to make a decision on the first cath change. Kathy told the dtr that [facility] may not want involvement since pt has never been seen by him despite the fact that Dr. Grigsby referred her to . I told the nurse we still have to get direction on the first SP cath change from a provider and we will have to get back to her. Please advise if HH or RN's in the office can change the SP cath or if pt needs to see a provider.       I called pt daughterRenay (verified name/ of pt) tried to schedule pt for visit with B 25 at 3:10pm pt daughter politely refused states she want to transfer care to Dr. Hernandez I informed Daughter that pt last seen Dr. White 2022 so pt should be following up with Dr. White, Dr. Grigsby was on call for the suprapubic. Daughter states she does not want pt to see Dr. White. I informed daughter this catheter is way over due. I confirmed with daughter eddy has not been changed since placed in O.R. with Dr. Grigsby. I made a consult 2/3/25 with Dr. Hernandez. Dr. Hernandez can pt be scheduled with RN to change SP please advise.      24 Dr. Grigsby   Procedure Performed:   Cystoscopy, urethral dilation  Bladder clot evacuation and fulguration of prostate  Suprapubic tube placement

## 2025-01-14 NOTE — TELEPHONE ENCOUNTER
I called pt daughterRenay (verified name/ of pt) tried to schedule pt for visit with HORACE 25 at 3:10pm pt daughter politely refused states she want to transfer care to Dr. Hernandez I informed Daughter that pt last seen Dr. White 2022 so pt should be following up with Dr. White, Dr. Grigsby was on call for the suprapubic. Daughter states she does not want pt to see Dr. White. I informed daughter this catheter is way over due. I confirmed with daughter eddy has not been changed since placed in O.R. with Dr. Grigsby. I made a consult 2/3/25 with Dr. Hernandez. Dr. Hernandez can pt be scheduled with RN to change SP please advise.     24 Dr. Grigsby   Procedure Performed:   Cystoscopy, urethral dilation  Bladder clot evacuation and fulguration of prostate  Suprapubic tube placement

## 2025-01-14 NOTE — TELEPHONE ENCOUNTER
Mechelle from [facility] awaiting call at [phone], will try nurse line, thanks.

## 2025-01-14 NOTE — TELEPHONE ENCOUNTER
It would be preferable for the patient to see one of the providers in our office for the first exchange. HH can perform them after that.

## 2025-01-15 NOTE — TELEPHONE ENCOUNTER
I called the  nurse Mechelle from [facility] and informed her of this also. She states that she knows the cath is an 18FR but not sure if the balloon was 5 ml, 10, ml or 30 ml. I told her that I will let her know when we change on Friday.

## 2025-01-15 NOTE — TELEPHONE ENCOUNTER
I called pt's dtr Renay and informed her of the instructions/orders as stated below and we arranged a N/V appt for Fri. 1/17 at 11 am. I told her the  nurse will do subsequent visit for monthly SP cath change. I gave her the address and directions to the office and parking. .

## 2025-01-15 NOTE — TELEPHONE ENCOUNTER
I s/w [facility] about this last night and he gave me v/o to have pt come in for N/V for the first SP cath change as there may be a stitch in place. The HH nurse can do subsequent cath changes if there are no issues with the change.

## 2025-01-17 NOTE — TELEPHONE ENCOUNTER
Spoke with patients daughter, she states something came up and she needs to reschedule nurse visit. I assisted in rescheduling nurse visit. She confirmed and verbalized understanding.     Future Appointments   Date Time Provider Department Center   1/20/2025 11:00 AM [facility] UROLOGY NURSE [facility]   1/29/2025  2:30 PM Eliazar Hernandez MD [facility]   4/14/2025 11:45 AM Saeed Gaytan MD [facility] [place]

## 2025-01-20 NOTE — TELEPHONE ENCOUNTER
Patient Daughter calling stating patient wont be able to make for today appointment nurse visit for catheter change and will need to reschedule.Please advise

## 2025-01-22 NOTE — TELEPHONE ENCOUNTER
3rd time pt is being rescheduled, informed JESSICA Barajas of this, she will contact pt's daughter.

## 2025-01-22 NOTE — TELEPHONE ENCOUNTER
Patients daughter cancelled todays appointment at 11:00 am due to weather. Patients daughter requesting new nurse visit for catheter change this Friday. Please call at [phone],thanks.

## 2025-01-23 NOTE — TELEPHONE ENCOUNTER
I called pt's dtr Renay and told her that lead nurse Jenn suggests that she have pt keep the appt to see [facility] next week on 1/29 as schd and after that visit we will change pt's SP cath. She was fine with this plan.

## 2025-01-29 NOTE — PROGRESS NOTES
[facility] Urology  Follow-Up Visit    HPI: Jose Eduardo Padilla is a 79 year old male presents for a follow up visit. Patient was last seen on 1/9/24 by urology PA.  Accompanied by his grandson.    INTERVAL HISTORY: Patient Dr. White.  Family requesting transition of care to my practice.    Patient with severe dementia and chronic urinary retention initially managed with an indwelling urethral Alejo catheter.    He subsequently underwent suprapubic catheter insertion by Dr. Grigsby on 11/1/2024.    He has canceled several appointments for suprapubic catheter exchange.  Presents today for the first exchange.    No fevers or chills.  Tolerating the catheter well.    Reviewed past medical, surgical, family, and social history.  Reviewed med list and allergies.      REVIEW OF SYSTEMS:  Pertinent positives and negatives per HPI. A 10-point ROS was performed and is otherwise negative.       EXAM:  There were no vitals taken for this visit.    Physical Exam  Constitutional:       Appearance: He is well-developed.   HENT:      Head: Normocephalic.   Eyes:      General: No scleral icterus.  Cardiovascular:      Rate and Rhythm: Normal rate.   Pulmonary:      Effort: Pulmonary effort is normal.   Musculoskeletal:      Comments: In a wheelchair.   Skin:     General: Skin is warm and dry.   Neurological:      Mental Status: He is alert and oriented to person, place, and time.   Psychiatric:         Mood and Affect: Mood normal.         Behavior: Behavior is agitated.       PATHOLOGY:  No results.      LABS:  No results.      IMAGING:  No results found.      UROLOGY PROCEDURE:  Suprapubic catheter exchanged by nurse Aguayo for a 16 Khmer catheter.      IMPRESSION:  79 year old male with chronic urinary retention.  Initially managed with an indwelling urethral Alejo catheter.  Subsequently underwent suprapubic catheter insertion by Dr. Grigsby 11/1/24.    He missed/canceled several appointments for  suprapubic catheter exchange.    Suprapubic catheter was exchanged today without any issues.    Continue suprapubic catheter to straight/gravity drainage.  Will need catheter exchange every 4 weeks by VNS at home.    He is to follow-up with us on a yearly basis.    Questions answered.      Eliazar Hernandez MD  1/29/2025

## 2025-01-29 NOTE — PROGRESS NOTES
-Pt presents to Urology w/ christo for initial s/p change; identity verified with name & ; transfer from w/c to exam table X2 assist; pt very resistant during entire procedure despite grandson holding pt's hand & verbalizing process; aspirate 7ml balloon prime & slowly remove s/p catheter; opening prepped w/ betadine; insert 16Fr. Alejo & instill 10ml sterile water to balloon; necessary to irrigate with 70ml sterile NS to verifiy placement; connected to extension tubing/ 2L collection bag; secured to right side abdomen w/ stat-lock; redressed with new Depends/ sweats; transferred to w/c X2 assist.  -Encounter complete.

## 2025-02-13 NOTE — ED QUICK NOTES
[facility] plan filled out d/t pt's aggressive behavior during suprapubic eddy insertion. He was trying to bite and punch staff. He was yelling in Wolof in an aggressive manner. Once eddy was placed, pt calmed down

## 2025-02-13 NOTE — ED PROVIDER NOTES
Patient Seen in: [facility] Emergency Department    History     Chief Complaint   Patient presents with    Eval-G       HPI    79-year-old male presents to the ED for catheter malfunction.  Per EMS, patient's catheter did not appear to be working and family called 911.  Patient has history of dementia and is unable to give history.  In triage, nurses were able to call patient's son who stated that the catheter was not working as there was no urine in the bag.  Patient unable to give history at this time    History from Independent Source: Initial history by EMS and patient's son is stated above    External Records Reviewed: Reviewed urology notes from 29 January.  Patient had prior Alejo catheter but had suprapubic catheter insertion completed on 1 November.  On 29 January, the catheter was changed out in office.    History reviewed.   Past Medical History:    BPH (benign prostatic hyperplasia)    Calculus of kidney    Colon, diverticulosis    Dementia (HCC)    Diabetes (HCC)    Diabetes mellitus type 2, controlled (HCC)    High blood pressure    Incontinence    Incontinence of feces    Prediabetes    borderline    Recurrent UTI    Tongue cancer (HCC)    Vitamin B12 deficiency       History reviewed.   Past Surgical History:   Procedure Laterality Date    Laparoscopy, surgical prostatectomy, retropubic radical, w/nerve sparing  11/01/2024    Other surgical history  2011    tongue cancer surgery, [place], no radiation/ chemo    Tongue and mouth surg unlisted      To remove Tongue CA '11         Medications :  Prescriptions Prior to Admission[1]     Family History   Problem Relation Age of Onset    Cancer Mother     Cancer Sister        Smoking Status:   Social History     Socioeconomic History    Marital status:    Tobacco Use    Smoking status: Never    Smokeless tobacco: Never   Vaping Use    Vaping status: Never Used   Substance and Sexual Activity    Alcohol use: No    Drug use: No        Constitutional and vital signs reviewed.      Social History and Family History elements reviewed from today, pertinent positives to the presenting problem noted.    Physical Exam     ED Triage Vitals [02/13/25 1204]   /65   Pulse 96   Resp 18   Temp 98 °F (36.7 °C)   Temp src Oral   SpO2 97 %   O2 Device None (Room air)       Physical Exam   Constitutional: Alert, well nourished, NAD  HEENT: Normocephalic, PERRLA, MMM  CV: s1s2+, RRR, no m/r/g, normal distal pulses  Pulmonary/Chest: CTA b/l with no rales, wheezes.  No chest wall tenderness  Abdominal: Nontender.  Nondistended. Soft. Bowel sounds are normal.  Suprapubic catheter in place with no urine in the bag.  Patient's clothes are saturated with urine  Neck/Back:   :   Musculoskeletal: Normal range of motion. No deformity.   Neurological: Awake, alert. Normal reflexes. No cranial nerve deficit.    Skin: Skin is warm and dry. No rash noted. No erythema.   Psychiatric:      All measures to prevent infection transmission during my interaction with the patient were taken. The patient was already wearing a droplet mask on my arrival to the room. Personal protective equipment was worn throughout the duration of the exam.      ED Course      Labs Reviewed - No data to display  My Independent Interpretation of EKG (if performed):     Imaging Results Available and Reviewed while in ED: No results found.  ED Medications Administered: Medications - No data to display          MDM     Vitals:    02/13/25 1204   BP: 138/65   Pulse: 96   Resp: 18   Temp: 98 °F (36.7 °C)   TempSrc: Oral   SpO2: 97%     *I personally reviewed and interpreted all ED vitals.    Independent Interpretation of Studies:     Social Determinants of Health:     Procedures: Suprapubic catheter exchange.  Family consented.  Under sterile precautions, suprapubic catheter was exchanged without difficulty.    Differential/MDM/Shared Decision Making: Differential Diagnosis includes clogged  catheter, displacement, others.      The patient already  has a past medical history of BPH (benign prostatic hyperplasia), Calculus of kidney, Colon, diverticulosis, Dementia (HCC), Diabetes (HCC), Diabetes mellitus type 2, controlled (HCC), High blood pressure, Incontinence, Incontinence of feces (1/14/2020), Prediabetes, Recurrent UTI, Tongue cancer (HCC), and Vitamin B12 deficiency.  to contribute to the complexity of this ED evaluation.           Medications, Diagnostics, or Disposition considered but not done:     Suprapubic catheter was exchanged.  Management of case was discussed with and will discharge back to home.      Condition upon leaving the department: Stable    Disposition and Plan     Clinical Impression:  1. Suprapubic catheter dysfunction, initial encounter        Disposition:  Discharge    Follow-up:  Eliazar Hernandez MD  [address] [place] [id number]  [phone]    Call in 2 day(s)        Medications Prescribed:  Current Discharge Medication List                   [1] (Not in a hospital admission)

## 2025-02-13 NOTE — ED QUICK NOTES
[facility] transportation set up for next available. Daughter states she is at home to receive pt.

## 2025-02-13 NOTE — ED INITIAL ASSESSMENT (HPI)
Poss dislodged catheter per EMS, pt speaks Tajik and when connected with   states unable to understand him. Pt seems confused in triage. Attempted to call both daughter and son to get triage info. NA from daughter and son states \"I'm at work theres no pee in his catheter bag\".

## 2025-02-14 NOTE — TELEPHONE ENCOUNTER
FYI: Home health has been unable to reach patient/daughter since 1/31, will try 1 more week and if unable to reach patient will be discharged.

## 2025-02-23 NOTE — ED PROVIDER NOTES
Patient Seen in: [facility] Emergency Department      History     Chief Complaint   Patient presents with    Cath Tube Problem     Stated Complaint:     Subjective:   HPI      79-year-old male presents for evaluation of catheter.  Patient brought by EMS reports family states that for 2 days he has had draining from around the tubing.  No fever or vomiting.    Objective:     Past Medical History:    BPH (benign prostatic hyperplasia)    Calculus of kidney    Colon, diverticulosis    Dementia (HCC)    Diabetes (HCC)    Diabetes mellitus type 2, controlled (HCC)    High blood pressure    Incontinence    Incontinence of feces    Prediabetes    borderline    Recurrent UTI    Tongue cancer (HCC)    Vitamin B12 deficiency              Past Surgical History:   Procedure Laterality Date    Laparoscopy, surgical prostatectomy, retropubic radical, w/nerve sparing  11/01/2024    Other surgical history  2011    tongue cancer surgery, [place], no radiation/ chemo    Tongue and mouth surg unlisted      To remove Tongue CA '11                Social History     Socioeconomic History    Marital status:    Tobacco Use    Smoking status: Never    Smokeless tobacco: Never   Vaping Use    Vaping status: Never Used   Substance and Sexual Activity    Alcohol use: No    Drug use: No     Social Drivers of Health     Food Insecurity: Unknown (11/1/2024)    Food Insecurity     Food Insecurity: Patient unable to answer   Transportation Needs: Unknown (11/1/2024)    Transportation Needs     Lack of Transportation: Patient unable to answer   Housing Stability: Unknown (11/1/2024)    Housing Stability     Housing Instability: Patient unable to answer                  Physical Exam     ED Triage Vitals [02/23/25 1408]   /90   Pulse 70   Resp 18   Temp 98 °F (36.7 °C)   Temp src Temporal   SpO2 98 %   O2 Device None (Room air)       Current Vitals:   Vital Signs  BP: 141/90  Pulse: 70  Resp: 18  Temp: 98 °F (36.7 °C)  Temp  src: Temporal    Oxygen Therapy  SpO2: 98 %  O2 Device: None (Room air)        Physical Exam  Vitals and nursing note reviewed.   Constitutional:       General: He is not in acute distress.     Appearance: Normal appearance. He is not toxic-appearing.   HENT:      Head: Normocephalic and atraumatic.   Eyes:      Conjunctiva/sclera: Conjunctivae normal.   Cardiovascular:      Rate and Rhythm: Normal rate.   Pulmonary:      Effort: Pulmonary effort is normal. No respiratory distress.   Abdominal:      Comments: Suprapubic catheter in place, abdomen is soft and nontender   Musculoskeletal:         General: Normal range of motion.      Cervical back: Normal range of motion and neck supple. No rigidity.   Neurological:      General: No focal deficit present.      Mental Status: He is alert.   Psychiatric:         Mood and Affect: Mood normal.             ED Course   Labs Reviewed - No data to display                MDM              Medical Decision Making  Catheter flushed by RN, initial resistance which improved after flushing and patient's catheter is now flushing and draining easily    Problems Addressed:  Urinary catheter dysfunction, initial encounter: acute illness or injury    Amount and/or Complexity of Data Reviewed  Independent Historian: EMS        Disposition and Plan     Clinical Impression:  1. Urinary catheter dysfunction, initial encounter         Disposition:  Discharge  2/23/2025  3:12 pm    Follow-up:  Eliazar Hernandez MD  [address] [place] [id number]  [phone]    Follow up  As needed    We recommend that you schedule follow up care with a primary care provider within the next three months to obtain basic health screening including reassessment of your blood pressure.      Medications Prescribed:  Current Discharge Medication List              Supplementary Documentation:

## 2025-02-23 NOTE — ED QUICK NOTES
Patient provided with discharge instructions. Verbalized understanding for plan of care at home and follow up. All question and concerns addressed prior to discharge. Pt discharge [facility] in stable condition.

## 2025-02-23 NOTE — ED INITIAL ASSESSMENT (HPI)
Pt arrived via [facility]. Pt was brought in due to leaking around the supra pubic catheter. Pt denies pain.

## 2025-03-02 NOTE — ED INITIAL ASSESSMENT (HPI)
Patient presents to ED via EMS for suprapubic catheter issue. Per EMS catheter is not draining and he was seen her last week for similar issue. Hx of dementia

## 2025-03-02 NOTE — ED PROVIDER NOTES
Patient Seen in: [facility] Emergency Department      History     Chief Complaint   Patient presents with    Cath Tube Problem     Stated Complaint: cath problem    Subjective:   HPI          Objective:     79-year-old male presents to the ED for catheter malfunction. Per EMS, patient's catheter did not appear to be working and family called 911. Patient has history of dementia and is unable to give history. In triage, nurses were able to call patient's son who stated that the catheter was not working as there was no urine in the bag. Patient unable to give history at this time       Past Surgical History:   Procedure Laterality Date    Laparoscopy, surgical prostatectomy, retropubic radical, w/nerve sparing  11/01/2024    Other surgical history  2011    tongue cancer surgery, [place], no radiation/ chemo    Tongue and mouth surg unlisted      To remove Tongue CA '11                Social History     Socioeconomic History    Marital status:    Tobacco Use    Smoking status: Never    Smokeless tobacco: Never   Vaping Use    Vaping status: Never Used   Substance and Sexual Activity    Alcohol use: No    Drug use: No     Social Drivers of Health     Food Insecurity: Unknown (11/1/2024)    Food Insecurity     Food Insecurity: Patient unable to answer   Transportation Needs: Unknown (11/1/2024)    Transportation Needs     Lack of Transportation: Patient unable to answer   Housing Stability: Unknown (11/1/2024)    Housing Stability     Housing Instability: Patient unable to answer                  Physical Exam     ED Triage Vitals [03/02/25 1314]   /73   Pulse 83   Resp 18   Temp 98.4 °F (36.9 °C)   Temp src Temporal   SpO2 99 %   O2 Device None (Room air)       Current Vitals:   Vital Signs  BP: 143/89  Pulse: 85  Resp: 15  Temp: 98.4 °F (36.9 °C)  Temp src: Temporal  MAP (mmHg): (!) 106    Oxygen Therapy  SpO2: 100 %  O2 Device: None (Room air)        Physical Exam  Physical Exam    Constitutional: Alert, well nourished, NAD  HEENT: Normocephalic, PERRLA, MMM  CV: s1s2+, RRR, no m/r/g, normal distal pulses  Pulmonary/Chest: CTA b/l with no rales, wheezes.  No chest wall tenderness  Abdominal: Nontender.  Nondistended. Soft. Bowel sounds are normal.  Suprapubic catheter in place with no urine in the bag.  Neck/Back:   :   Musculoskeletal: Normal range of motion. No deformity.   Neurological: Awake, alert. Normal reflexes. No cranial nerve deficit.    Skin: Skin is warm and dry. No rash noted. No erythema.       ED Course   Labs Reviewed - No data to display         RN was unable to irrigate Alejo so it was replaced with 16 [place] Alejo.  Good urine output       MDM      Use of independent historian: EMS provides history    I personally reviewed and interpreted the images :     No results found.    Vitals:    03/02/25 1314 03/02/25 1315   BP: 133/73 143/89   Pulse: 83 85   Resp: 18 15   Temp: 98.4 °F (36.9 °C)    TempSrc: Temporal    SpO2: 99% 100%     *I personally reviewed and interpreted all ED vitals.    Pulse Ox: 100%, Room air, Normal       Differential Diagnosis/ Diagnostic Considerations: Patient with suprapubic catheter that seems to be malfunctioning consider clogged consider can consider Alejo failure    Medical Record Review: I personally reviewed available prior medical records for any recent pertinent discharge summaries, testing, and procedures and reviewed those reports and found patient seen ED on 213 and 223 for suprapubic Alejo problems.    Complicating Factors: The patient already has dementia and suprapubic Alejo catheter which contribute to the complexity of this ED evaluation.    Social determinants of health:    Prescription drug management:      Shared Decision Making:    ED Course: Catheter was replaced without difficulty and good urine output no    Discussion of management with other healthcare providers:    Condition upon leaving the department:  Stable          Medical Decision Making      Disposition and Plan     Clinical Impression:  1. Urinary catheter dysfunction, initial encounter         Disposition:  Discharge  3/2/2025  1:35 pm    Follow-up:  Vito Plata MD  [address] [place] [id number] [phone]    Follow up      We recommend that you schedule follow up care with a primary care provider within the next three months to obtain basic health screening including reassessment of your blood pressure.      Medications Prescribed:  Current Discharge Medication List              Supplementary Documentation:

## 2025-03-10 NOTE — TELEPHONE ENCOUNTER
Donepezil 10 mg sent on 12/11/2024 #90 with 3 refills.     Sent [employer] message to patient to contact pharmacy.

## 2025-04-21 NOTE — PROGRESS NOTES
The following individual(s) verbally consented to be recorded using ambient AI listening technology and understand that they can each withdraw their consent to this listening technology at any point by asking the clinician to turn off or pause the recording:    Patient name: Jose Eduardosocorro Padilla  Additional names:  Scott Whitt (son)
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
## 2025-04-22 NOTE — TELEPHONE ENCOUNTER
Pt son was advised to come in [place] office for disabilities parking placard +[facility] form [id number] for citizenship testing. Advised will cb once completed.

## 2025-04-22 NOTE — TELEPHONE ENCOUNTER
Certification for parking placard and Medical Certification for Disability Exemption have been completed.    The patients daughter was contacted as we will need the patients DL number and/or State ID in order for the physician to proceed to sign. If the patient does not have either, the forms will npt be able to be signed by the physician as these are state documents that clarify that the physician certifies that he/she has confirmed the ID (s).  Will await the return call from the patients son to confirm on status of ID's.

## 2025-04-29 NOTE — TELEPHONE ENCOUNTER
Received fax from [facility] requesting progress Note for incontinence supplies notes faxed to [facility] [phone]

## (undated) DIAGNOSIS — A41.9 SEVERE SEPSIS (HCC): ICD-10-CM

## (undated) DIAGNOSIS — R65.20 SEVERE SEPSIS (HCC): ICD-10-CM

## (undated) DEVICE — SYRINGE MED 50ML LL TIP DISP

## (undated) DEVICE — Device

## (undated) DEVICE — SOLUTION IRRIG 1000ML ST H2O AQUALITE PLAS

## (undated) DEVICE — CUTTING LOOP, BIPOLAR, 24/26 FR.: Brand: N.A.

## (undated) DEVICE — BAG DRNGE 2000ML URIN INF CTRL ANTIREFLX

## (undated) DEVICE — PACK CUSTOM CYSTO

## (undated) DEVICE — 20 ML SYRINGE LUER-LOCK TIP: Brand: MONOJECT

## (undated) DEVICE — SLEEVE COMPR MD KNEE LEN SGL USE KENDALL SCD

## (undated) DEVICE — NEEDLE SPNL 20GA L3.5IN YEL QNCKE STYL DISP

## (undated) DEVICE — CATHETER URETH 18FR BLLN 5CC SIL ALLY W/ SIL

## (undated) DEVICE — URINE DRAINAGE DUAL FUNCTION BAG, NEEDLE SAMPLING, ANTI-REFLUX DEVICE, DRAIN TUBE, 4000 ML: Brand: DOVER

## (undated) DEVICE — GUIDEWIRE URO L150CM DIA0.035IN 3CM STR

## (undated) DEVICE — GAMMEX® PI HYBRID SIZE 7, STERILE POWDER-FREE SURGICAL GLOVE, POLYISOPRENE AND NEOPRENE BLEND: Brand: GAMMEX

## (undated) DEVICE — SOLUTION IRRIG 3000ML 1.5% GL USP

## (undated) DEVICE — ONE-STEP SUPRAPUBIC INTRODUCER: Brand: COOK

## (undated) DEVICE — SYRINGE,TOOMEY,IRRIGATION,70CC,STERILE: Brand: MEDLINE

## (undated) NOTE — Clinical Note
MARISELA Billings completed TCM. Patient has upcoming TCM/HFU appointment scheduled on 06/19/23. Thank you.

## (undated) NOTE — IP AVS SNAPSHOT
[facility]            (For Outpatient Use Only) Initial Admit Date: 2023   Inpt/Obs Admit Date: Inpt: 23 / Obs: N/A   Discharge Date:    Renny Winter:  [de-identified]   MRN: [de-identified]   CSN: [id number]   CEID: [id number]        ENCOUNTER  Patient Class: Inpatient Admitting Provider: Mookie Kwon MD Unit: [address] [place] [address]/SE   Hospital Service: Medical Attending Provider: Godwin Glez MD   Bed: 556-A   Visit Type:   Referring Physician: No ref. provider found Billing Flag:    Admit Diagnosis: Urinary tract infection associated with indwelling urethral catheter, [facility] [P79. *      PATIENT  Legal Name:   Joceline Burton   Legal Sex: Male  Gender ID:              [address] [place] [address],[address] Name:    PCP:  Regina Hagen MD Home: [phone]   Address:  [address] [place] [address] :  (77 yrs) Mobile: [phone]         City/State/Zip: [place] [address] Marital:  Language: [facility]    County: [facility] SSN4: [id number] Adventist: No Adventist Given-No Chloé*     Race: Other Ethnicity: Non  Or [address] [place] [address]   Name Relationship Legal Guardian? Home Phone Work Phone Mobile Phone   1. Marilynn Airam  2.  Katerine Richards Daughter  Son      [phone]       GUARANTOR  GuarantorMarci Marlow :  Home Phone: [phone]   Address: [address] [place] [address]  Sex: Male Work Phone:    City/State/Zip: [place] [address] [place]   Rel. to Patient: Self Guarantor ID: [id number]   GUARANTOR EMPLOYER   Employer:  Status: RETIRED     COVERAGE  PRIMARY INSURANCE   Payor: BLUE CROSS MEDICAID Plan: BLUE CROSS [facility]*   Group Number: [id number] Insurance Type: [address] Name: Karlenescradames Jagdeep : 1945   Subscriber ID: [id number] Pt Rel to Subscriber: Self   SECONDARY INSURANCE   Payor:  Plan:    Group Number:  Insurance Type:    Subscriber Name:  Subscriber :    Subscriber ID:  Pt Rel to Subscriber:    TERTIARY INSURANCE   Payor: Plan:    Group Number:  Insurance Type:    Subscriber Name:  Subscriber :    Subscriber ID:  Pt Rel to Subscriber:    Hospital Account Financial Class: Medicaid Advantage    2023

## (undated) NOTE — IP AVS SNAPSHOT
[facility]            (For Outpatient Use Only) Initial Admit Date: 2022   Inpt/Obs Admit Date: Inpt: 22 / Obs: N/A   Discharge Date:    Hospital Acct:  [de-identified]   MRN: [de-identified]   CSN: [id number]   CEID: [id number]        ENCOUNTER  Patient Class: Inpatient Admitting Provider: Maria Teresa Newton MD Unit: [address] [facility] Service: Medical Attending Provider: Janna Lomas DO   Bed: 514-A   Visit Type:   Referring Physician: No ref. provider found Billing Flag:    Admit Diagnosis: Acute cystitis without hematuria [N30.00]      PATIENT  Legal Name:   Farhan Bernardo   Legal Sex: Male  Gender ID:              [address] [facility],[address] Floor Name:    PCP:  Becki Irwin MD Home: [phone]   Address:  [address] [place] [address] : [birth year] (76 yrs) Mobile: [phone]         City/State/Zip: [place] [address] Marital:  Language: [facility]    County: [facility] SSN4: [id number] Jehovah's witness: No Jehovah's witness Given-No Chloé*     Race: Other Ethnicity: Non  Or [address] [place] [address]   Name Relationship Legal Guardian? Home Phone Work Phone Mobile Phone   1Jorge Alonzo  2.  Annie Veliz Daughter  Son      [phone]       GUARANTOR  GuarantorEarlis [place] : [address] Home Phone: [phone]   Address: [address] [place] [address]  Sex: Male Work Phone:    City/[facility]/Zip: [place] [address] [place]   Rel. to Patient: Self Guarantor ID: [id number]   GUARANTOR EMPLOYER   Employer:  Status: RETIRED     COVERAGE  PRIMARY INSURANCE   Payor: BLUE CROSS MEDICAID Plan: BLUE CROSS [facility]*   Group Number: [id number] Insurance Type: [address] Name: Griselda Campion : 1945   Subscriber ID: [id number] Pt Rel to Subscriber: Self   SECONDARY INSURANCE   Payor:  Plan:    Group Number:  Insurance Type:    Subscriber Name:  Subscriber :    Subscriber ID:  Pt Rel to Subscriber:    TERTIARY INSURANCE   Payor:  Plan:    Group Number:  Insurance Type:    Subscriber Name: Subscriber :    Subscriber ID:  Pt Rel to Subscriber:    Hospital Account Financial Class: Medicaid Advantage    2022

## (undated) NOTE — Clinical Note
Spoke with dutch Niño today--video HFU made for 5/22/2023--declines in office appt--thank you.   Future Appointments 5/22/2023  12:00 PM   Heather Mccall MD           [facility] Silvia Cordova

## (undated) NOTE — LETTER
Date & Time: 3/19/2024, 6:52 AM  Patient: Jose Eduardo Daniel Padilla    Dear Jose Eduardo Padilla,    After numerous attempts, we have been unable to contact you via telephone. We are trying to reach you to provide an update regarding test results and a change in your treatment plan.        Please contact the Emergency Department charge nurse at [phone].    Sincerely,    [facility]

## (undated) NOTE — IP AVS SNAPSHOT
[facility]            (For Outpatient Use Only) Initial Admit Date: 2022   Inpt/Obs Admit Date: Inpt: 22 / Obs: N/A   Discharge Date:    Hoonah Cornea:  [de-identified]   MRN: [de-identified]   CSN: [id number]   CEID: [id number]        ENCOUNTER  Patient Class: Inpatient Admitting Provider: Shekhar Rincon MD Unit: [address] [place], [place] [address]W/SE   Hospital Service: Medical Attending Provider: Star Poole. Starla Serrano MD   Bed: 563-A   Visit Type:   Referring Physician: No ref. provider found Billing Flag:    Admit Diagnosis: History of ESBL E. coli infection [Z86.19]      PATIENT  Legal Name:   Sunny Soto   Legal Sex: Male  Gender ID:              [address] [place] [address],[address] Name:    PCP:  Sallie Toro MD Home: [phone]   Address:  [address] [place] [address] :  (76 yrs) Mobile: [phone]         City/State/Zip: [place] [address] Marital:  Language: [facility]    County: [facility] SSN4: [id number] Jew: No Jew Given-No Chloé*     Race: Other Ethnicity: Non  Or [address] [place] [address]   Name Relationship Legal Guardian? Home Phone Work Phone Mobile Phone   1. Orlando Harris  2.  Juanita [place] Daughter  Son      [phone]       GUARANTOR  GuarantorZofrancisco Arriaga :  Home Phone: [phone]   Address: [address] [place] [address]  Sex: Male Work Phone:    City/State/Zip: [place] [address] [place]   Rel. to Patient: Self Guarantor ID: [id number]   GUARANTOR EMPLOYER   Employer:  Status: RETIRED     COVERAGE  PRIMARY INSURANCE   Payor: BLUE CROSS MEDICAID Plan: BLUE CROSS [facility]*   Group Number: [id number] Insurance Type: [address] Name: Leonora Marquez : 1945   Subscriber ID: [id number] Pt Rel to Subscriber: Self   SECONDARY INSURANCE   Payor:  Plan:    Group Number:  Insurance Type:    Subscriber Name:  Subscriber :    Subscriber ID:  Pt Rel to Subscriber:    TERTIARY INSURANCE   Payor:  Plan:    Group Number:  Insurance Type:    Subscriber Name: Subscriber :    Subscriber ID:  Pt Rel to Subscriber:    Hospital Account Financial Class: Medicaid Advantage    August 3, 2022